# Patient Record
Sex: FEMALE | Employment: UNEMPLOYED | URBAN - NONMETROPOLITAN AREA
[De-identification: names, ages, dates, MRNs, and addresses within clinical notes are randomized per-mention and may not be internally consistent; named-entity substitution may affect disease eponyms.]

---

## 2018-03-22 ENCOUNTER — INITIAL CONSULT (OUTPATIENT)
Dept: PULMONOLOGY | Age: 63
End: 2018-03-22
Payer: COMMERCIAL

## 2018-03-22 VITALS
SYSTOLIC BLOOD PRESSURE: 108 MMHG | BODY MASS INDEX: 35.52 KG/M2 | WEIGHT: 221 LBS | TEMPERATURE: 97.8 F | HEART RATE: 110 BPM | DIASTOLIC BLOOD PRESSURE: 72 MMHG | OXYGEN SATURATION: 96 % | HEIGHT: 66 IN

## 2018-03-22 DIAGNOSIS — G47.10 HYPERSOMNIA: Primary | ICD-10-CM

## 2018-03-22 DIAGNOSIS — Z72.0 TOBACCO ABUSE: ICD-10-CM

## 2018-03-22 PROCEDURE — 99203 OFFICE O/P NEW LOW 30 MIN: CPT | Performed by: INTERNAL MEDICINE

## 2018-03-22 RX ORDER — MULTIVIT-MIN/IRON/FOLIC ACID/K 18-600-40
CAPSULE ORAL
COMMUNITY

## 2018-03-22 RX ORDER — M-VIT,TX,IRON,MINS/CALC/FOLIC 27MG-0.4MG
1 TABLET ORAL DAILY
COMMUNITY

## 2018-03-22 RX ORDER — PRAVASTATIN SODIUM 10 MG
10 TABLET ORAL DAILY
COMMUNITY

## 2018-03-22 RX ORDER — BUDESONIDE AND FORMOTEROL FUMARATE DIHYDRATE 80; 4.5 UG/1; UG/1
2 AEROSOL RESPIRATORY (INHALATION) 2 TIMES DAILY
COMMUNITY
End: 2018-04-19 | Stop reason: DRUGHIGH

## 2018-03-22 RX ORDER — LISINOPRIL AND HYDROCHLOROTHIAZIDE 25; 20 MG/1; MG/1
1 TABLET ORAL DAILY
Status: ON HOLD | COMMUNITY
End: 2021-07-27 | Stop reason: HOSPADM

## 2018-03-22 RX ORDER — IPRATROPIUM BROMIDE AND ALBUTEROL SULFATE 2.5; .5 MG/3ML; MG/3ML
1 SOLUTION RESPIRATORY (INHALATION) EVERY 4 HOURS
COMMUNITY
End: 2020-01-08 | Stop reason: ALTCHOICE

## 2018-03-22 RX ORDER — CITALOPRAM 20 MG/1
20 TABLET ORAL DAILY
COMMUNITY
End: 2018-08-02

## 2018-03-22 NOTE — PROGRESS NOTES
Chief Complaint:    Mallampati airway Class: 4  Neck Circumference:.17 Inches    [employer] sleepiness score 3/22/18: 10
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
## 2018-03-22 NOTE — PATIENT INSTRUCTIONS
get mad at yourself if you smoke again. Make a list of things you learned and think about when you want to try again, such as next week, next month, or next year. Where can you learn more? Go to [email or web address] org and sign in to your [employer] account. Enter I233 in the Mibio box to learn more about \"Stopping Smoking: Care Instructions. \"     If you do not have an account, please click on the \"Sign Up Now\" link. Current as of: March 20, 2017  Content Version: 11.5  © 8783-2106 Healthwise, [employer]. Care instructions adapted under license by [address] If you have questions about a medical condition or this instruction, always ask your healthcare professional. [address] any warranty or liability for your use of this information.

## 2018-04-09 DIAGNOSIS — Z72.0 TOBACCO ABUSE: ICD-10-CM

## 2018-04-09 DIAGNOSIS — G47.10 HYPERSOMNIA: ICD-10-CM

## 2018-04-16 DIAGNOSIS — Z72.0 TOBACCO ABUSE: ICD-10-CM

## 2018-04-16 DIAGNOSIS — G47.10 HYPERSOMNIA: ICD-10-CM

## 2018-04-18 ENCOUNTER — OFFICE VISIT (OUTPATIENT)
Dept: PULMONOLOGY | Age: 63
End: 2018-04-18
Payer: COMMERCIAL

## 2018-04-18 VITALS
HEART RATE: 98 BPM | DIASTOLIC BLOOD PRESSURE: 72 MMHG | TEMPERATURE: 98 F | BODY MASS INDEX: 35.93 KG/M2 | OXYGEN SATURATION: 95 % | SYSTOLIC BLOOD PRESSURE: 116 MMHG | RESPIRATION RATE: 18 BRPM | HEIGHT: 66 IN | WEIGHT: 223.6 LBS

## 2018-04-18 DIAGNOSIS — J96.11 CHRONIC RESPIRATORY FAILURE WITH HYPOXIA (HCC): ICD-10-CM

## 2018-04-18 DIAGNOSIS — J44.9 CHRONIC OBSTRUCTIVE PULMONARY DISEASE, UNSPECIFIED COPD TYPE (HCC): ICD-10-CM

## 2018-04-18 DIAGNOSIS — F17.219 NICOTINE DEPENDENCE, CIGARETTES, WITH UNSPECIFIED NICOTINE-INDUCED DISORDERS: Primary | ICD-10-CM

## 2018-04-18 PROCEDURE — 99214 OFFICE O/P EST MOD 30 MIN: CPT | Performed by: NURSE PRACTITIONER

## 2018-04-18 PROCEDURE — G0296 VISIT TO DETERM LDCT ELIG: HCPCS | Performed by: NURSE PRACTITIONER

## 2018-04-18 ASSESSMENT — ENCOUNTER SYMPTOMS
DIARRHEA: 0
SINUS PAIN: 1
ORTHOPNEA: 0
HEMOPTYSIS: 0
SPUTUM PRODUCTION: 0
COUGH: 0
SHORTNESS OF BREATH: 1
VOMITING: 0
DOUBLE VISION: 0
WHEEZING: 0
BLURRED VISION: 0
SORE THROAT: 0
NAUSEA: 0

## 2018-04-19 ENCOUNTER — OFFICE VISIT (OUTPATIENT)
Dept: PULMONOLOGY | Age: 63
End: 2018-04-19
Payer: COMMERCIAL

## 2018-04-19 VITALS
OXYGEN SATURATION: 94 % | HEIGHT: 66 IN | RESPIRATION RATE: 16 BRPM | HEART RATE: 85 BPM | WEIGHT: 223.6 LBS | DIASTOLIC BLOOD PRESSURE: 70 MMHG | SYSTOLIC BLOOD PRESSURE: 118 MMHG | BODY MASS INDEX: 35.93 KG/M2

## 2018-04-19 DIAGNOSIS — G47.61 PLMD (PERIODIC LIMB MOVEMENT DISORDER): ICD-10-CM

## 2018-04-19 DIAGNOSIS — J44.9 STAGE 3 SEVERE COPD BY GOLD CLASSIFICATION (HCC): Primary | ICD-10-CM

## 2018-04-19 DIAGNOSIS — J44.9 STAGE 3 SEVERE COPD BY GOLD CLASSIFICATION (HCC): ICD-10-CM

## 2018-04-19 DIAGNOSIS — G47.10 HYPERSOMNIA: ICD-10-CM

## 2018-04-19 PROCEDURE — 99999 ALPHA-1-ANTITRYPSIN: CPT | Performed by: INTERNAL MEDICINE

## 2018-04-19 PROCEDURE — 94618 PULMONARY STRESS TESTING: CPT | Performed by: INTERNAL MEDICINE

## 2018-04-19 PROCEDURE — 99215 OFFICE O/P EST HI 40 MIN: CPT | Performed by: INTERNAL MEDICINE

## 2018-04-19 RX ORDER — BUDESONIDE AND FORMOTEROL FUMARATE DIHYDRATE 160; 4.5 UG/1; UG/1
2 AEROSOL RESPIRATORY (INHALATION) 2 TIMES DAILY
Qty: 1 INHALER | Refills: 11 | Status: SHIPPED | OUTPATIENT
Start: 2018-04-19 | End: 2020-01-08 | Stop reason: ALTCHOICE

## 2018-04-24 ENCOUNTER — TELEPHONE (OUTPATIENT)
Dept: PULMONOLOGY | Age: 63
End: 2018-04-24

## 2018-05-31 DIAGNOSIS — G47.10 HYPERSOMNIA: ICD-10-CM

## 2018-05-31 DIAGNOSIS — J44.9 STAGE 3 SEVERE COPD BY GOLD CLASSIFICATION (HCC): ICD-10-CM

## 2018-05-31 DIAGNOSIS — G47.61 PLMD (PERIODIC LIMB MOVEMENT DISORDER): ICD-10-CM

## 2018-08-02 ENCOUNTER — OFFICE VISIT (OUTPATIENT)
Dept: PULMONOLOGY | Age: 63
End: 2018-08-02
Payer: COMMERCIAL

## 2018-08-02 VITALS
HEART RATE: 73 BPM | OXYGEN SATURATION: 92 % | DIASTOLIC BLOOD PRESSURE: 64 MMHG | HEIGHT: 66 IN | TEMPERATURE: 98.2 F | WEIGHT: 215 LBS | SYSTOLIC BLOOD PRESSURE: 110 MMHG | BODY MASS INDEX: 34.55 KG/M2

## 2018-08-02 DIAGNOSIS — Z87.891 PERSONAL HISTORY OF TOBACCO USE: ICD-10-CM

## 2018-08-02 DIAGNOSIS — J44.9 STAGE 3 SEVERE COPD BY GOLD CLASSIFICATION (HCC): Primary | ICD-10-CM

## 2018-08-02 PROCEDURE — 99214 OFFICE O/P EST MOD 30 MIN: CPT | Performed by: INTERNAL MEDICINE

## 2018-08-02 RX ORDER — ALBUTEROL SULFATE 90 UG/1
2 AEROSOL, METERED RESPIRATORY (INHALATION) EVERY 6 HOURS PRN
COMMUNITY
End: 2020-05-13 | Stop reason: SDUPTHER
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
## 2018-10-04 NOTE — PROGRESS NOTES
[facility] D PO) Take by mouth as needed      Ibuprofen (ADVIL PO) Take by mouth as needed      FUROSEMIDE PO Take 20 mg by mouth        No current facility-administered medications for this visit. No family history on file. /76 (Site: Left Upper Arm, Position: Sitting, Cuff Size: Medium Adult)   Pulse 91   Temp 98.9 °F (37.2 °C) Comment: Tympanic  Resp 20   Ht 5' 6\" (1.676 m)   Wt 213 lb 9.6 oz (96.9 kg)   SpO2 92% Comment: on RA  BMI 34.48 kg/m²     BMI:  Body mass index is 34.48 kg/m². Mallampati airway Class:  IV  Neck Circumference: 17 Inches    Physical Exam :  Nursing note and vitals reviewed. Constitutional: Patient appears moderately built and moderately nourished. No distress. Patient is oriented to person, place, and time. HENT:   Head: Normocephalic and atraumatic. Right Ear: External ear normal.   Left Ear: External ear normal.   Mouth/Throat: Oropharynx is clear and moist.  No oral thrush. Eyes: Conjunctivae are normal. Pupils are equal, round, and reactive to light. No scleral icterus. Neck: Neck supple. No JVD present. No tracheal deviation present. Cardiovascular: Normal rate, regular rhythm, normal heart sounds. No murmur heard. Pulmonary/Chest: Effort normal and breath sounds normal. No stridor. No respiratory distress. Bilateral expiratory wheezes. No rales. Patient exhibits no tenderness. Abdominal: Soft. Patient exhibits no distension. No tenderness. Musculoskeletal: Normal range of motion. Extremities: Patient exhibits no edema and no tenderness. Lymphadenopathy:  No cervical adenopathy. Neurological: Patient is alert and oriented to person, place, and time. Skin: Skin is warm and dry. Patient is not diaphoretic. Psychiatric: Patient  has a normal mood and affect.  Patient behavior is normal.         Diagnostic Data:  Sleep study:  Sleep study done on :                                PFTS: 4/9/2018              Chest Xray: 2/22/18       CTA

## 2018-10-11 ENCOUNTER — OFFICE VISIT (OUTPATIENT)
Dept: PULMONOLOGY | Age: 63
End: 2018-10-11
Payer: COMMERCIAL

## 2018-10-11 VITALS
HEART RATE: 91 BPM | TEMPERATURE: 98.9 F | HEIGHT: 66 IN | WEIGHT: 213.6 LBS | BODY MASS INDEX: 34.33 KG/M2 | OXYGEN SATURATION: 92 % | DIASTOLIC BLOOD PRESSURE: 76 MMHG | SYSTOLIC BLOOD PRESSURE: 124 MMHG | RESPIRATION RATE: 20 BRPM

## 2018-10-11 DIAGNOSIS — J20.8 ACUTE BRONCHITIS DUE TO OTHER SPECIFIED ORGANISMS: ICD-10-CM

## 2018-10-11 DIAGNOSIS — J44.9 STAGE 4 VERY SEVERE COPD BY GOLD CLASSIFICATION (HCC): Primary | ICD-10-CM

## 2018-10-11 DIAGNOSIS — Z72.0 TOBACCO ABUSE: ICD-10-CM

## 2018-10-11 DIAGNOSIS — J44.1 COPD EXACERBATION (HCC): ICD-10-CM

## 2018-10-11 PROCEDURE — 99214 OFFICE O/P EST MOD 30 MIN: CPT | Performed by: INTERNAL MEDICINE

## 2018-10-11 RX ORDER — AZITHROMYCIN 250 MG/1
TABLET, FILM COATED ORAL
Qty: 1 PACKET | Refills: 0 | Status: SHIPPED | OUTPATIENT
Start: 2018-10-11 | End: 2018-10-15

## 2018-10-11 RX ORDER — PREDNISONE 10 MG/1
TABLET ORAL
Qty: 30 TABLET | Refills: 0 | Status: SHIPPED | OUTPATIENT
Start: 2018-10-11 | End: 2018-10-21

## 2019-10-16 ENCOUNTER — TELEPHONE (OUTPATIENT)
Dept: PULMONOLOGY | Age: 64
End: 2019-10-16

## 2019-10-16 DIAGNOSIS — J44.9 STAGE 3 SEVERE COPD BY GOLD CLASSIFICATION (HCC): Primary | ICD-10-CM

## 2019-10-16 DIAGNOSIS — Z87.891 PERSONAL HISTORY OF TOBACCO USE, PRESENTING HAZARDS TO HEALTH: ICD-10-CM

## 2019-10-22 DIAGNOSIS — Z87.891 PERSONAL HISTORY OF TOBACCO USE, PRESENTING HAZARDS TO HEALTH: ICD-10-CM

## 2019-10-22 DIAGNOSIS — J44.9 STAGE 3 SEVERE COPD BY GOLD CLASSIFICATION (HCC): ICD-10-CM

## 2019-12-27 ENCOUNTER — TELEPHONE (OUTPATIENT)
Dept: PULMONOLOGY | Age: 64
End: 2019-12-27

## 2020-01-08 ENCOUNTER — OFFICE VISIT (OUTPATIENT)
Dept: PULMONOLOGY | Age: 65
End: 2020-01-08
Payer: COMMERCIAL

## 2020-01-08 VITALS
DIASTOLIC BLOOD PRESSURE: 78 MMHG | OXYGEN SATURATION: 94 % | WEIGHT: 208 LBS | BODY MASS INDEX: 33.43 KG/M2 | HEART RATE: 88 BPM | TEMPERATURE: 97.8 F | HEIGHT: 66 IN | SYSTOLIC BLOOD PRESSURE: 120 MMHG

## 2020-01-08 PROCEDURE — 94618 PULMONARY STRESS TESTING: CPT | Performed by: NURSE PRACTITIONER

## 2020-01-08 PROCEDURE — 99406 BEHAV CHNG SMOKING 3-10 MIN: CPT | Performed by: NURSE PRACTITIONER

## 2020-01-08 PROCEDURE — 99215 OFFICE O/P EST HI 40 MIN: CPT | Performed by: NURSE PRACTITIONER

## 2020-01-08 RX ORDER — CITALOPRAM 10 MG/1
10 TABLET ORAL DAILY PRN
COMMUNITY
End: 2020-05-13

## 2020-01-08 RX ORDER — PREDNISONE 10 MG/1
TABLET ORAL
Qty: 30 TABLET | Refills: 0 | Status: SHIPPED | OUTPATIENT
Start: 2020-01-08 | End: 2020-02-19 | Stop reason: ALTCHOICE

## 2020-01-08 RX ORDER — ALBUTEROL SULFATE 2.5 MG/3ML
2.5 SOLUTION RESPIRATORY (INHALATION) EVERY 4 HOURS PRN
Qty: 360 ML | Refills: 11 | Status: SHIPPED
Start: 2020-01-08 | End: 2020-02-19 | Stop reason: ALTCHOICE

## 2020-01-08 RX ORDER — DOXYCYCLINE HYCLATE 100 MG/1
100 CAPSULE ORAL 2 TIMES DAILY
Qty: 14 CAPSULE | Refills: 0 | Status: SHIPPED | OUTPATIENT
Start: 2020-01-08 | End: 2020-01-15

## 2020-01-08 NOTE — PROGRESS NOTES
[facility]     Patient: Thang Andre, 62 y.o.   : 1955   Pt of Dr. Hanane Gallegos   Patient presents with    Follow-up     Follow up from Chest CT Lung Screen 10/22/19        HPI  eJanette Miller is here for 1 year follow up for very severe COPD with CT Lung screening and PFT. Presents with worsening SOB, treated for acute exacerbation around [place] and symptoms have worsened. Unable to walk 100 feet without SOB. Continues on Symbicort and using DuoNeb 6 times per day. Has increased home 02 from 3-4 Liters at times with activity and from 2-3 Liters with sleep. Continues on Daliresp. Recently saw Cardiologist for SOB but is poor historian about testing. Was told she did not have to follow up. 42 pack year smoking history, still smoking 10 per day. FEV1 20-25, FEV/FVC 55 (good BD response). Decrease from  in 2018. A1A MM. CT Lung screening with mild emphysema, old granulomas, no nodules. Zyrtec for seasonal allergies with controlled symptoms. Normal sleep study 2018 (1847.6 min <88%). CAT Score: 29  MMRC Score: 3     Progress History:   Since last visit any new medical issues? No  New ER or hospitlal visits? No  Any new or changes in medicines? No  Using inhalers? Yes Symbicort  Are they helpful?  Yes   Past Medical hx   PMH:  Past Medical History:   Diagnosis Date    CHF (congestive heart failure) (HCC)     COPD (chronic obstructive pulmonary disease) (HCC)     Diabetes mellitus (HCC)     HLD (hyperlipidemia)     Hypertension      SURGICAL HISTORY:  Past Surgical History:   Procedure Laterality Date    APPENDECTOMY       SECTION      FOOT SURGERY      HERNIA REPAIR       SOCIAL HISTORY:  Social History     Tobacco Use    Smoking status: Current Some Day Smoker     Packs/day: 1.00     Years: 42.00     Pack years: 42.00     Start date: 3/22/1978    Smokeless tobacco: Never Used    Tobacco comment: 10 cigarettes per day 1/8/2020   Substance Use Topics    Alcohol use: No    Drug use: No     ALLERGIES:  Allergies   Allergen Reactions    Norco [Hydrocodone-Acetaminophen]      FAMILY HISTORY:History reviewed. No pertinent family history.   CURRENT MEDICATIONS:  Current Outpatient Medications   Medication Sig Dispense Refill    aspirin 81 MG tablet Take 81 mg by mouth daily      citalopram (CELEXA) 10 MG tablet Take 10 mg by mouth daily as needed      fluticasone-umeclidin-vilant (TRELEGY ELLIPTA) 100-62.5-25 MCG/INH AEPB Inhale 1 puff into the lungs daily 30 each 11    albuterol (PROVENTIL) (2.5 MG/3ML) 0.083% nebulizer solution Take 3 mLs by nebulization every 4 hours as needed for Wheezing or Shortness of Breath 360 mL 11    doxycycline hyclate (VIBRAMYCIN) 100 MG capsule Take 1 capsule by mouth 2 times daily for 7 days 14 capsule 0    predniSONE (DELTASONE) 10 MG tablet 40 mg for 3 days then 30 mg for 3 days then 20 mg for 3 days then 10 mg for 3 days then stop 30 tablet 0    albuterol sulfate  (90 Base) MCG/ACT inhaler Inhale 2 puffs into the lungs every 6 hours as needed for Wheezing      metoprolol tartrate (LOPRESSOR) 25 MG tablet Take 25 mg by mouth 2 times daily      lisinopril-hydrochlorothiazide (PRINZIDE;ZESTORETIC) 20-25 MG per tablet Take 1 tablet by mouth daily      Roflumilast (DALIRESP) 500 MCG tablet Take 500 mcg by mouth daily      metFORMIN (GLUCOPHAGE) 500 MG tablet Take 500 mg by mouth 2 times daily (with meals)      Cholecalciferol (VITAMIN D) 2000 units CAPS capsule Take by mouth      Cyanocobalamin (B-12 PO) Take by mouth      Multiple Vitamins-Minerals (THERAPEUTIC MULTIVITAMIN-MINERALS) tablet Take 1 tablet by mouth daily      Cetirizine HCl (ZYRTEC PO) Take by mouth      pravastatin (PRAVACHOL) 10 MG tablet Take 10 mg by mouth daily      Pseudoephedrine-Guaifenesin (MUCINEX D PO) Take by mouth as needed      Ibuprofen (ADVIL PO) Take by mouth as needed      FUROSEMIDE PO Take 20 mg by mouth        No current facility-administered medications for this visit. Destiny KENNY   General/Constitutional: No recent loss of weight or appetite changes. No fever or chills. HENT: Negative. Eyes: Negative. Upper respiratory tract: + nasal stuffiness, no  post nasal drip. Lower respiratory tract/ lungs: + cough with yellow sputum production. No hemoptysis. Cardiovascular: No palpitations or chest pain. Gastrointestinal: No nausea or vomiting. Neurological: No focal neurologiacal weakness. Extremities: Chronic RLE edema. Musculoskeletal: No complaints. Genitourinary: No complaints. Hematological: Negative. Psychiatric/Behavioral: Negative. Skin: No itching. Physical exam   /78 (Site: Left Upper Arm, Position: Sitting, Cuff Size: Medium Adult)   Pulse 88   Temp 97.8 °F (36.6 °C) (Tympanic)   Ht 5' 6\" (1.676 m)   Wt 208 lb (94.3 kg)   SpO2 94% Comment: on 4 LPM  BMI 33.57 kg/m²      Constitutional: Patient appears moderately built and moderately nourished in no acute distress. Head: Normocephalic and atraumatic. Mouth/Throat: Oropharynx is clear and moist. No oral thrush. Eyes: Conjunctivae are normal. Pupils are equal, round, and reactive to light. No scleral icterus. Neck: Neck supple. No JVD or tracheal deviation present. Cardiovascular: Regular rate, regular rhythm, S1 and S2 with no murmur. Trace right ankle edema. Pulmonary/Chest: Normal effort with diminished breath sounds. Scattered wheezing, no rales or rhonchi. Normal AP diameter. No stridor. No respiratory distress. Abdominal: Soft. Bowel sounds audible. No distension or tenderness to palpation. Musculoskeletal: Moves all extremities. Lymphadenopathy: No cervical adenopathy. Neurological: Patient is alert and oriented to person, place, and time. No focal deficits. Skin: Skin is warm and dry. No clubbing, no cyanosis.     Test results   Lung Nodule Screening     [x] Qualifies []Does not qualify   [] Declined    [x] Completed                     Assessment      Diagnosis Orders   1. Stage 4 very severe COPD by GOLD classification ([facility] [address]  albuterol (PROVENTIL) (2.5 MG/3ML) 0.083% nebulizer solution    6 Minute Walk Test    External Referral To Pulmonary Rehab    Pulse oximetry, overnight    With acute exacerbation   2. Chronic respiratory failure with hypoxia (HCC)     3. Cigarette nicotine dependence with other nicotine-induced disorder     4. Chronic diastolic heart failure ([facility] [address]     5. Physical deconditioning           Plan       Six Minute Walk Test  Graciela Mauro 1955    Six minute walk test done in my office today by my medical assistant. Ginna's oxygen saturation at rest on room air was 90%. Her oxygen saturation dropped to 86% on room air with exertion. The six minute walk test was repeated with oxygen supplementation. Oxygen supplimentation was started with 2 LPM via nasal cannula and remained at 2LPM via nasal cannula. At the end of the test Gigi Jaramillo's oxygen saturation remained at 92% on 2 LPM with exertion. Patient ambulated a total of 216 feet and was very SOB with walk, complained of knee pain, but no events. Resting heart rate was 78 and 111 upon completion of the walk. Continue 02 at 2 Liters with activity and sleep. Overnight pulsox on 2 Liters. I reviewed CT and PFT findings with Konstantin Kulkarni and educated her on the severity of her COPD. Konstantin Kulkarni was surprised to hear she had COPD. I took much time educating her on the disease process, how severe her disease is and her poor prognosis, especially if she continues to smoke. Prednisone taper. Doxycycline 100 mg BID for 7 days. Stop Symbicort. Trelegy 1 puff daily, rinse and spit (1 sample given with demonstration). Change DuoNeb to Ventolin Neb PRN. Continue Daliresp. She agrees to Pulmonary rehab. UTD on PNA vaccines. Advised to get flu vaccine when improved. Records from Dr. Joan Shepard (Cardiology).   We discussed surgical/treatment options if she would reach being 6 months smoke free. 5 minutes of visit was discussing smoking, health risks involved and cessation treatment options. We discussed the risks and benefits of various tobacco cessation strategies, including \"cold turkey,\" nicotine replacement (nicotine patch, gum, etc.), Wellbutrin and Chantix and combination therapy if needed. She is reluctant to use Chantix, NRT and will continue on her own. 45 minutes was spent on this visit with > 50% being face to face obtaining HPI, examining patient, reviewing test results, educating about disease process, discussing smoking cessation, monitoring testing/discussing results (6 minute walk) and coordinating a treatment plan. Will see Alanna Vargas in: 3 months.     Electronically signed by SEVERIANO Gavin CNP on 1/8/2020 at 3:22 PM

## 2020-01-13 ENCOUNTER — CLINICAL DOCUMENTATION (OUTPATIENT)
Dept: PULMONOLOGY | Age: 65
End: 2020-01-13

## 2020-01-13 NOTE — PROGRESS NOTES
Spoke with Toney Bonilla about overnight pulsox results and to increase 02 to 3 Liters with sleep. Reports benefit with Trelegy, but insurance is denying. $0 co-pay mailed.     Electronically signed by SEVERIANO Gavin CNP on 1/13/2020 at 8:59 AM

## 2020-02-19 ENCOUNTER — OFFICE VISIT (OUTPATIENT)
Dept: PULMONOLOGY | Age: 65
End: 2020-02-19
Payer: COMMERCIAL

## 2020-02-19 VITALS
HEIGHT: 66 IN | WEIGHT: 209 LBS | HEART RATE: 119 BPM | DIASTOLIC BLOOD PRESSURE: 72 MMHG | OXYGEN SATURATION: 96 % | SYSTOLIC BLOOD PRESSURE: 128 MMHG | BODY MASS INDEX: 33.59 KG/M2 | TEMPERATURE: 97.2 F

## 2020-02-19 PROCEDURE — 99406 BEHAV CHNG SMOKING 3-10 MIN: CPT | Performed by: NURSE PRACTITIONER

## 2020-02-19 PROCEDURE — 99215 OFFICE O/P EST HI 40 MIN: CPT | Performed by: NURSE PRACTITIONER

## 2020-02-19 RX ORDER — FLUTICASONE PROPIONATE 50 MCG
2 SPRAY, SUSPENSION (ML) NASAL DAILY
Qty: 1 BOTTLE | Refills: 11 | Status: SHIPPED | OUTPATIENT
Start: 2020-02-19 | End: 2020-05-13 | Stop reason: SDUPTHER

## 2020-02-19 RX ORDER — IPRATROPIUM BROMIDE AND ALBUTEROL SULFATE 2.5; .5 MG/3ML; MG/3ML
1 SOLUTION RESPIRATORY (INHALATION) 4 TIMES DAILY
Qty: 360 ML | Refills: 11 | Status: SHIPPED | OUTPATIENT
Start: 2020-02-19 | End: 2020-08-19

## 2020-02-19 RX ORDER — DILTIAZEM HYDROCHLORIDE 120 MG/1
120 CAPSULE, COATED, EXTENDED RELEASE ORAL DAILY
COMMUNITY
End: 2020-05-13

## 2020-02-19 RX ORDER — PREDNISONE 10 MG/1
TABLET ORAL
Qty: 30 TABLET | Refills: 0 | Status: SHIPPED | OUTPATIENT
Start: 2020-02-19 | End: 2020-05-13

## 2020-02-19 RX ORDER — DOXYCYCLINE HYCLATE 100 MG/1
100 CAPSULE ORAL 2 TIMES DAILY
Qty: 14 CAPSULE | Refills: 0 | Status: SHIPPED | OUTPATIENT
Start: 2020-02-19 | End: 2020-02-26

## 2020-02-19 RX ORDER — BUDESONIDE 0.5 MG/2ML
500 INHALANT ORAL 2 TIMES DAILY
Qty: 60 AMPULE | Refills: 3 | Status: SHIPPED | OUTPATIENT
Start: 2020-02-19 | End: 2021-05-26 | Stop reason: SDUPTHER

## 2020-02-19 NOTE — PATIENT INSTRUCTIONS
smoking, you improve the health of everyone who now breathes in your smoke. · Their heart, lung, and cancer risks drop, much like yours. · They are sick less. For babies and small children, living smoke-free means they're less likely to have ear infections, pneumonia, and bronchitis. · If you're a woman who is or will be pregnant someday, quitting smoking means a healthier . · Children who are close to you are less likely to become adult smokers. Where can you learn more? Go to https://JUNIQEryanRazor Insights.Bigfoot Networks. org and sign in to your Volofy account. Enter [phone] in the Smarter Remarketer box to learn more about \"Learning About Benefits From Quitting Smoking. \"     If you do not have an account, please click on the \"Sign Up Now\" link. Current as of: 2019  Content Version: 12.3  © 9758-7012 [employer]. Care instructions adapted under license by [facility] [place] ([facility]). If you have questions about a medical condition or this instruction, always ask your healthcare professional. [address] any warranty or liability for your use of this information. Patient Education        Chronic Obstructive Pulmonary Disease (COPD): Care Instructions  Your Care Instructions    Chronic obstructive pulmonary disease (COPD) is a general term for a group of lung diseases, including emphysema and chronic bronchitis. People with COPD have decreased airflow in and out of the lungs, which makes it hard to breathe. The airways also can get clogged with thick mucus. Cigarette smoking is a major cause of COPD. Although there is no cure for COPD, you can slow its progress. Following your treatment plan and taking care of yourself can help you feel better and live longer. Follow-up care is a key part of your treatment and safety. Be sure to make and go to all appointments, and call your doctor if you are having problems.  It's also a good idea to know your test results and keep a list of the medicines you take. How can you care for yourself at home?   Staying healthy    · Do not smoke. This is the most important step you can take to prevent more damage to your lungs. If you need help quitting, talk to your doctor about stop-smoking programs and medicines. These can increase your chances of quitting for good.     · Avoid colds and flu. Get a pneumococcal vaccine shot. If you have had one before, ask your doctor whether you need a second dose. Get the flu vaccine every fall. If you must be around people with colds or the flu, wash your hands often.     · Avoid secondhand smoke, air pollution, and high altitudes. Also avoid cold, dry air and hot, humid air. Stay at home with your windows closed when air pollution is bad.    Medicines and oxygen therapy    · Take your medicines exactly as prescribed. Call your doctor if you think you are having a problem with your medicine.     · You may be taking medicines such as:  ? Bronchodilators. These help open your airways and make breathing easier. Bronchodilators are either short-acting (work for 6 to 9 hours) or long-acting (work for 24 hours). You inhale most bronchodilators, so they start to act quickly. Always carry your quick-relief inhaler with you in case you need it while you are away from home. ? Corticosteroids (prednisone, budesonide). These reduce airway inflammation. They come in pill or inhaled form. You must take these medicines every day for them to work well.     · A spacer may help you get more inhaled medicine to your lungs. Ask your doctor or pharmacist if a spacer is right for you. If it is, ask how to use it properly.     · Do not take any vitamins, over-the-counter medicine, or herbal products without talking to your doctor first.     · If your doctor prescribed antibiotics, take them as directed. Do not stop taking them just because you feel better.  You need to take the full course of antibiotics.     · Oxygen therapy boosts the amount of oxygen in your blood and helps you breathe easier. Use the flow rate your doctor has recommended, and do not change it without talking to your doctor first.   Activity    · Get regular exercise. Walking is an easy way to get exercise. Start out slowly, and walk a little more each day.     · Pay attention to your breathing. You are exercising too hard if you cannot talk while you are exercising.     · Take short rest breaks when doing household chores and other activities.     · Learn breathing methods--such as breathing through pursed lips--to help you become less short of breath.     · If your doctor has not set you up with a pulmonary rehabilitation program, talk to him or her about whether rehab is right for you. Rehab includes exercise programs, education about your disease and how to manage it, help with diet and other changes, and emotional support. Diet    · Eat regular, healthy meals. Use bronchodilators about 1 hour before you eat to make it easier to eat. Eat several small meals instead of three large ones. Drink beverages at the end of the meal. Avoid foods that are hard to chew.     · Eat foods that contain protein so that you do not lose muscle mass.     · Talk with your doctor if you gain too much weight or if you lose weight without trying.    Mental health    · Talk to your family, friends, or a therapist about your feelings. It is normal to feel frightened, angry, hopeless, helpless, and even guilty. Talking openly about bad feelings can help you cope. If these feelings last, talk to your doctor. When should you call for help? Call 911 anytime you think you may need emergency care.  For example, call if:    · You have severe trouble breathing.    Call your doctor now or seek immediate medical care if:    · You have new or worse trouble breathing.     · You cough up blood.     · You have a fever.    Watch closely for changes in your health, and be sure to contact your doctor if:    · You cough more deeply or more often, especially if you notice more mucus or a change in the color of your mucus.     · You have new or worse swelling in your legs or belly.     · You are not getting better as expected. Where can you learn more? Go to https://chryaneb.healthWavii. org and sign in to your Kelwayt account. Enter Q272 in the Flashstock box to learn more about \"Chronic Obstructive Pulmonary Disease (COPD): Care Instructions. \"     If you do not have an account, please click on the \"Sign Up Now\" link. Current as of: June 9, 2019  Content Version: 12.3  © 6810-1789 [employer]wise[employer]. Care instructions adapted under license by [facility] [place] ([facility]). If you have questions about a medical condition or this instruction, always ask your healthcare professional. Lanette Friedman any warranty or liability for your use of this information.

## 2020-02-19 NOTE — PROGRESS NOTES
[place] [facility]     Patient: Esther Flannery, 60 y.o.   : 1955   Pt of Dr. Jolly Montana   Patient presents with   Aakash Goodman is here for a 1 month COPD follow up, PCP requested to be seen sooner. TORO  Lasha Bales is here for 1 month follow up for . very severe COPD with CT Lung screening and PFT. Presents with worsening SOB, treated for acute exacerbation around [place] and symptoms have worsened. Unable to walk 100 feet without SOB. Continues on Symbicort and using DuoNeb 6 times per day. Has increased home 02 from 3-4 Liters at times with activity and from 2-3 Liters with sleep. Continues on Daliresp. Recently saw Cardiologist for SOB but is poor historian about testing. Was told she did not have to follow up. 42 pack year smoking history, still smoking 10 per day. FEV1 20-25, FEV/FVC 55 (good BD response). Decrease from 30/61 in 2018. A1A MM. CT Lung screening with mild emphysema, old granulomas, no nodules. Zyrtec for seasonal allergies with controlled symptoms. Normal sleep study 2018 (1847.6 min <88%). MMRC Score: 5    Progress History:   Since last visit any new medical issues? No  New ER or hospitlal visits? Yes   Any new or changes in medicines? No  Using inhalers? Yes   Are they helpful?  No  Past Medical hx   PMH:  Past Medical History:   Diagnosis Date    CHF (congestive heart failure) (HCC)     COPD (chronic obstructive pulmonary disease) (HCC)     Diabetes mellitus (HCC)     HLD (hyperlipidemia)     Hypertension      SURGICAL HISTORY:  Past Surgical History:   Procedure Laterality Date    APPENDECTOMY       SECTION      FOOT SURGERY      HERNIA REPAIR       SOCIAL HISTORY:  Social History     Tobacco Use    Smoking status: Current Some Day Smoker     Packs/day: 1.00     Years: 42.00     Pack years: 42.00     Start date: 3/22/1978    Smokeless tobacco: Never Used    Tobacco

## 2020-02-20 NOTE — PROGRESS NOTES
SECTION      FOOT SURGERY      HERNIA REPAIR       SOCIAL HISTORY:  Social History     Tobacco Use    Smoking status: Current Some Day Smoker     Packs/day: 1.00     Years: 42.00     Pack years: 42.00     Start date: 3/22/1978    Smokeless tobacco: Never Used    Tobacco comment: 1-2 cigarettes a week 20   Substance Use Topics    Alcohol use: No    Drug use: No     ALLERGIES:  Allergies   Allergen Reactions    Norco [Hydrocodone-Acetaminophen]      FAMILY HISTORY:History reviewed. No pertinent family history.   CURRENT MEDICATIONS:  Current Outpatient Medications   Medication Sig Dispense Refill    dilTIAZem (CARDIZEM CD) 120 MG extended release capsule Take 120 mg by mouth daily      budesonide (PULMICORT) 0.5 MG/2ML nebulizer suspension Take 2 mLs by nebulization 2 times daily 60 ampule 3    ipratropium-albuterol (DUONEB) 0.5-2.5 (3) MG/3ML SOLN nebulizer solution Inhale 3 mLs into the lungs 4 times daily 360 mL 11    predniSONE (DELTASONE) 10 MG tablet 40 mg for 3 days then 30 mg for 3 days then 20 mg for 3 days then 10 mg for 3 days then stop 30 tablet 0    doxycycline hyclate (VIBRAMYCIN) 100 MG capsule Take 1 capsule by mouth 2 times daily for 7 days 14 capsule 0    Sodium Chloride-Sodium Bicarb (AYR SALINE NASAL RINSE) 1.57 g PACK 1 packet by Nasal route 2 times daily as needed (sinus congestion) 7 each 1    fluticasone (FLONASE) 50 MCG/ACT nasal spray 2 sprays by Nasal route daily 1 Bottle 11    aspirin 81 MG tablet Take 81 mg by mouth daily      citalopram (CELEXA) 10 MG tablet Take 10 mg by mouth daily as needed      albuterol sulfate  (90 Base) MCG/ACT inhaler Inhale 2 puffs into the lungs every 6 hours as needed for Wheezing      metoprolol tartrate (LOPRESSOR) 25 MG tablet Take 25 mg by mouth 2 times daily      lisinopril-hydrochlorothiazide (PRINZIDE;ZESTORETIC) 20-25 MG per tablet Take 1 tablet by mouth daily      Roflumilast (DALIRESP) 500 MCG tablet Take 500 mcg by mouth daily      metFORMIN (GLUCOPHAGE) 500 MG tablet Take 500 mg by mouth 2 times daily (with meals) 1,000 mg at supper.  Cholecalciferol (VITAMIN D) 2000 units CAPS capsule Take by mouth      Cyanocobalamin (B-12 PO) Take by mouth      Multiple Vitamins-Minerals (THERAPEUTIC MULTIVITAMIN-MINERALS) tablet Take 1 tablet by mouth daily      Cetirizine HCl (ZYRTEC PO) Take by mouth      pravastatin (PRAVACHOL) 10 MG tablet Take 10 mg by mouth daily      Pseudoephedrine-Guaifenesin (MUCINEX D PO) Take by mouth as needed      Ibuprofen (ADVIL PO) Take by mouth as needed      FUROSEMIDE PO Take 20 mg by mouth        No current facility-administered medications for this visit. Aneita Christiano ROS   General/Constitutional: No recent loss of weight or appetite changes. No fever or chills. HENT: Negative. Eyes: Negative. Upper respiratory tract: + nasal stuffiness and post nasal drip. Lower respiratory tract/ lungs: + cough with yellow sputum production. No hemoptysis. Cardiovascular: No palpitations or chest pain. Gastrointestinal: No nausea or vomiting. Neurological: No focal neurologiacal weakness. Extremities: No edema. Musculoskeletal: No complaints. Genitourinary: No complaints. Hematological: Negative. Psychiatric/Behavioral: Negative. Skin: No itching. Physical exam   /72 (Site: Left Upper Arm, Position: Sitting, Cuff Size: Medium Adult)   Pulse 119   Temp 97.2 °F (36.2 °C) (Tympanic)   Ht 5' 6\" (1.676 m)   Wt 209 lb (94.8 kg)   SpO2 96% Comment: on 3 LPM on Inogen  BMI 33.73 kg/m²      Constitutional: Patient appears moderately built and moderately nourished in no acute distress. Head: Normocephalic and atraumatic. Mouth/Throat: Oropharynx is clear and moist. No oral thrush. Eyes: Conjunctivae are normal. Pupils are equal, round, and reactive to light. No scleral icterus. Neck: Neck supple. No JVD or tracheal deviation present.    Cardiovascular: Regular rate, (Cardiology). Discussed options for Life 2000 vs NIVPPV. Further discussed surgical/treatment options if she would reach being 4- 6 months smoke free and participating in Pulmonary Rehab. May need anxiolytic PRN but seems depressed. Advised to follow with PCP. Join COPD support group.      5 minutes of visit was discussing smoking, health risks involved and cessation treatment options. She refused Chantix. Is doing better with Nicotine patch/gum combination. Hopefully not just because of repeated exacerbations and will resume when feeling better. >40 minutes was spent on this visit with > 50% being face to face obtaining HPI, examining patient, reviewing test results, hospitalization, re-educating about disease process, possible treatment options, discussing smoking cessation and coordinating a treatment plan. Will see Cyndy Ennis in: 8 weeks.     SEVERIANO Davis, ACNP-C  2/20/2020

## 2020-05-13 ENCOUNTER — OFFICE VISIT (OUTPATIENT)
Dept: PULMONOLOGY | Age: 65
End: 2020-05-13
Payer: COMMERCIAL

## 2020-05-13 VITALS
SYSTOLIC BLOOD PRESSURE: 120 MMHG | DIASTOLIC BLOOD PRESSURE: 70 MMHG | HEIGHT: 66 IN | RESPIRATION RATE: 20 BRPM | BODY MASS INDEX: 35.52 KG/M2 | WEIGHT: 221 LBS | OXYGEN SATURATION: 92 % | HEART RATE: 87 BPM | TEMPERATURE: 97.7 F

## 2020-05-13 PROCEDURE — 99214 OFFICE O/P EST MOD 30 MIN: CPT | Performed by: NURSE PRACTITIONER

## 2020-05-13 PROCEDURE — 99406 BEHAV CHNG SMOKING 3-10 MIN: CPT | Performed by: NURSE PRACTITIONER

## 2020-05-13 RX ORDER — VIT C/E/ZN/COPPR/LUTEIN/ZEAXAN 250MG-90MG
CAPSULE ORAL
COMMUNITY
End: 2021-05-26

## 2020-05-13 RX ORDER — FLUTICASONE FUROATE, UMECLIDINIUM BROMIDE AND VILANTEROL TRIFENATATE 100; 62.5; 25 UG/1; UG/1; UG/1
1 POWDER RESPIRATORY (INHALATION) DAILY
COMMUNITY
End: 2020-05-13 | Stop reason: ALTCHOICE

## 2020-05-13 RX ORDER — FLUTICASONE PROPIONATE 50 MCG
2 SPRAY, SUSPENSION (ML) NASAL DAILY
Qty: 1 BOTTLE | Refills: 11 | Status: SHIPPED | OUTPATIENT
Start: 2020-05-13 | End: 2020-08-19 | Stop reason: SDUPTHER

## 2020-05-13 RX ORDER — LEVOCETIRIZINE DIHYDROCHLORIDE 5 MG/1
5 TABLET, FILM COATED ORAL NIGHTLY
Qty: 30 TABLET | Refills: 11 | Status: SHIPPED | OUTPATIENT
Start: 2020-05-13 | End: 2020-08-19 | Stop reason: SDUPTHER

## 2020-05-13 RX ORDER — ALBUTEROL SULFATE 90 UG/1
2 AEROSOL, METERED RESPIRATORY (INHALATION) EVERY 4 HOURS PRN
Qty: 1 INHALER | Refills: 11 | Status: SHIPPED | OUTPATIENT
Start: 2020-05-13 | End: 2020-08-19 | Stop reason: SDUPTHER

## 2020-05-13 NOTE — PROGRESS NOTES
increased sinus congestion. Normal sleep study 2018 (147.6 min <88%). Repeat echo done in hospital, normal EF, DD.  BB changed to CCB.      MMRC Score: 3    Progress History:   Since last visit any new medical issues? No  New ER or hospitlal visits? No  Any new or changes in medicines? No  Using inhalers? Yes   Are they helpful? Yes   Past Medical hx   PMH:  Past Medical History:   Diagnosis Date    CHF (congestive heart failure) (HCC)     COPD (chronic obstructive pulmonary disease) (HCC)     Diabetes mellitus (HCC)     HLD (hyperlipidemia)     Hypertension      SURGICAL HISTORY:  Past Surgical History:   Procedure Laterality Date    APPENDECTOMY       SECTION      FOOT SURGERY      HERNIA REPAIR       SOCIAL HISTORY:  Social History     Tobacco Use    Smoking status: Current Some Day Smoker     Packs/day: 1.00     Years: 42.00     Pack years: 42.00     Start date: 3/22/1978    Smokeless tobacco: Never Used    Tobacco comment: 1-2 cigarettes a week 20   Substance Use Topics    Alcohol use: No    Drug use: No     ALLERGIES:  Allergies   Allergen Reactions    Norco [Hydrocodone-Acetaminophen]      FAMILY HISTORY:No family history on file.   CURRENT MEDICATIONS:  Current Outpatient Medications   Medication Sig Dispense Refill    dilTIAZem (CARDIZEM CD) 120 MG extended release capsule Take 120 mg by mouth daily      budesonide (PULMICORT) 0.5 MG/2ML nebulizer suspension Take 2 mLs by nebulization 2 times daily 60 ampule 3    ipratropium-albuterol (DUONEB) 0.5-2.5 (3) MG/3ML SOLN nebulizer solution Inhale 3 mLs into the lungs 4 times daily 360 mL 11    predniSONE (DELTASONE) 10 MG tablet 40 mg for 3 days then 30 mg for 3 days then 20 mg for 3 days then 10 mg for 3 days then stop 30 tablet 0    Sodium Chloride-Sodium Bicarb (AYR SALINE NASAL RINSE) 1.57 g PACK 1 packet by Nasal route 2 times daily as needed (sinus congestion) 7 each 1    fluticasone (FLONASE) 50 MCG/ACT nasal spray 2 options. We discussed the risks and benefits of various tobacco cessation strategies, including \"cold turkey,\" nicotine replacement (nicotine patch, gum, etc.), Wellbutrin and Chantix and combination therapy if needed. She continues Nicotine patch and gum. Offered referral to smoking cessation program.      Will see Melissa Suarez back in: 3 months.     SEVERIANO Canseco, ACNP-C  5/13/2020

## 2020-08-19 ENCOUNTER — OFFICE VISIT (OUTPATIENT)
Dept: PULMONOLOGY | Age: 65
End: 2020-08-19
Payer: COMMERCIAL

## 2020-08-19 VITALS
TEMPERATURE: 99.2 F | WEIGHT: 214.8 LBS | HEIGHT: 66 IN | DIASTOLIC BLOOD PRESSURE: 80 MMHG | HEART RATE: 107 BPM | SYSTOLIC BLOOD PRESSURE: 132 MMHG | OXYGEN SATURATION: 98 % | BODY MASS INDEX: 34.52 KG/M2

## 2020-08-19 PROCEDURE — 99215 OFFICE O/P EST HI 40 MIN: CPT | Performed by: NURSE PRACTITIONER

## 2020-08-19 PROCEDURE — 99406 BEHAV CHNG SMOKING 3-10 MIN: CPT | Performed by: NURSE PRACTITIONER

## 2020-08-19 RX ORDER — FLUTICASONE PROPIONATE 50 MCG
2 SPRAY, SUSPENSION (ML) NASAL DAILY
Qty: 1 BOTTLE | Refills: 11 | Status: SHIPPED | OUTPATIENT
Start: 2020-08-19 | End: 2022-08-03

## 2020-08-19 RX ORDER — IPRATROPIUM BROMIDE AND ALBUTEROL SULFATE 2.5; .5 MG/3ML; MG/3ML
1 SOLUTION RESPIRATORY (INHALATION) EVERY 4 HOURS PRN
Qty: 360 ML | Refills: 11 | Status: SHIPPED | OUTPATIENT
Start: 2020-08-19 | End: 2021-05-26 | Stop reason: SDUPTHER

## 2020-08-19 RX ORDER — ALBUTEROL SULFATE 90 UG/1
2 AEROSOL, METERED RESPIRATORY (INHALATION) EVERY 4 HOURS PRN
Qty: 1 INHALER | Refills: 11 | Status: SHIPPED | OUTPATIENT
Start: 2020-08-19

## 2020-08-19 RX ORDER — PREDNISONE 10 MG/1
10 TABLET ORAL DAILY
Qty: 30 TABLET | Refills: 3 | Status: SHIPPED | OUTPATIENT
Start: 2020-08-19 | End: 2021-05-26 | Stop reason: SDUPTHER

## 2020-08-19 RX ORDER — LEVOCETIRIZINE DIHYDROCHLORIDE 5 MG/1
5 TABLET, FILM COATED ORAL NIGHTLY
Qty: 30 TABLET | Refills: 11 | Status: SHIPPED | OUTPATIENT
Start: 2020-08-19

## 2020-08-19 NOTE — PROGRESS NOTES
[place] [facility]     Patient: , 60 y.o.   : 1955   Pt of Dr. Edyta Neil     Chief Complaint   Patient presents with    Follow-up     COPD 3 month follow up with no testing        HPI  Emily Stacy is here for 3 month follow up for very severe COPD. 42 pack year smoking history, still smoking but down to 1 pack ever 2- weeks. .  FEV1 20-25, FEV/FVC 55 (good BD response). Decrease from 30/61 in 2018. A1A MM. CT Lung screening with mild emphysema, old granulomas, no nodules. Home 02 at 2-3 Liters with activity and 3 Liters with sleep. Normal sleep study 2018 (147.6 min <88%). Echo, normal EF, DD. RVSP 27. Does best with Nebulizers and continues on Pulmicort with DuoNeb 4-6 times daily with benefit. When last seen was still taking Trelegy with Nebulzers. Resumed Daliresp. Xyzal added to Flonase for allergies. Presents today doing best I have seen since meeting her for the first time in 2020. Last hospitalization or exacerbation was prior to last visit in May. She has better understanding of severity of her disease and has been more compliant with treatment. Having shoulder issues, was placed on Prednisone and had significant benefit with her breathing and energy. Due to C0VID, has not started Pulmonary Rehab. Essentially home bound, she does not go out due to fear of needing home Nebulizer treatment. Current machine is old, large and not portable. Does not benefit with inhaler so does not use. Still smoking but down to 1 pack every 2-3 weeks.  buys as he and daughter smoke. Able to quit for 4 months by having them smoke outside, but not compliant with cold weather. Also struggles with anxiety. MMRC Score: 3-4    Progress History:   Since last visit any new medical issues? No  New ER or hospitlal visits? No  Any new or changes in medicines? No  Using inhalers? Yes   Are they helpful?  Yes   Past Medical hx PMH:  Past Medical History:   Diagnosis Date    CHF (congestive heart failure) (HCC)     COPD (chronic obstructive pulmonary disease) (HCC)     Diabetes mellitus (HCC)     HLD (hyperlipidemia)     Hypertension      SURGICAL HISTORY:  Past Surgical History:   Procedure Laterality Date    APPENDECTOMY       SECTION      FOOT SURGERY      HERNIA REPAIR       SOCIAL HISTORY:  Social History     Tobacco Use    Smoking status: Current Some Day Smoker     Packs/day: 1.00     Years: 42.00     Pack years: 42.00     Start date: 3/22/1978    Smokeless tobacco: Never Used    Tobacco comment: 1 pack every 2-3 weeks   Substance Use Topics    Alcohol use: No    Drug use: No     ALLERGIES:  Allergies   Allergen Reactions    Norco [Hydrocodone-Acetaminophen]      FAMILY HISTORY:History reviewed. No pertinent family history.   CURRENT MEDICATIONS:  Current Outpatient Medications   Medication Sig Dispense Refill    Multiple Vitamins-Minerals (PRESERVISION AREDS 2) CHEW Take by mouth      fluticasone (FLONASE) 50 MCG/ACT nasal spray 2 sprays by Nasal route daily 1 Bottle 11    levocetirizine (XYZAL) 5 MG tablet Take 1 tablet by mouth nightly 30 tablet 11    Roflumilast (DALIRESP) 500 MCG tablet Take 1 tablet by mouth daily 30 tablet 11    albuterol sulfate  (90 Base) MCG/ACT inhaler Inhale 2 puffs into the lungs every 4 hours as needed for Wheezing or Shortness of Breath 1 Inhaler 11    budesonide (PULMICORT) 0.5 MG/2ML nebulizer suspension Take 2 mLs by nebulization 2 times daily 60 ampule 3    ipratropium-albuterol (DUONEB) 0.5-2.5 (3) MG/3ML SOLN nebulizer solution Inhale 3 mLs into the lungs 4 times daily 360 mL 11    Sodium Chloride-Sodium Bicarb (AYR SALINE NASAL RINSE) 1.57 g PACK 1 packet by Nasal route 2 times daily as needed (sinus congestion) 7 each 1    aspirin 81 MG tablet Take 81 mg by mouth daily      metoprolol tartrate (LOPRESSOR) 25 MG tablet Take 25 mg by mouth 2 times daily  lisinopril-hydrochlorothiazide (PRINZIDE;ZESTORETIC) 20-25 MG per tablet Take 1 tablet by mouth daily      metFORMIN (GLUCOPHAGE) 500 MG tablet Take 500 mg by mouth 2 times daily (with meals) 1,000 mg at supper.  Cholecalciferol (VITAMIN D) 2000 units CAPS capsule Take by mouth      Cyanocobalamin (B-12 PO) Take by mouth      Multiple Vitamins-Minerals (THERAPEUTIC MULTIVITAMIN-MINERALS) tablet Take 1 tablet by mouth daily      pravastatin (PRAVACHOL) 10 MG tablet Take 10 mg by mouth daily      Pseudoephedrine-Guaifenesin (MUCINEX D PO) Take by mouth as needed      Ibuprofen (ADVIL PO) Take by mouth as needed      FUROSEMIDE PO Take 20 mg by mouth        No current facility-administered medications for this visit. Feliz KENNY   General/Constitutional: No recent loss of weight or appetite changes. No fever or chills. HENT: Negative. Eyes: Negative. Upper respiratory tract: Occasional nasal stuffiness and post nasal drip. Lower respiratory tract/ lungs: Occasional cough with clear sputum production. No hemoptysis. Cardiovascular: No palpitations or chest pain. Gastrointestinal: No nausea or vomiting. Neurological: No focal neurologiacal weakness. Extremities: No increased edema. Musculoskeletal: No complaints. Genitourinary: No complaints. Hematological: Negative. Psychiatric/Behavioral: Negative. Skin: No itching. Physical exam   /80 (Site: Left Upper Arm, Position: Sitting, Cuff Size: Medium Adult)   Pulse 107   Temp 99.2 °F (37.3 °C) (Tympanic)   Ht 5' 6\" (1.676 m)   Wt 214 lb 12.8 oz (97.4 kg)   SpO2 98% Comment: on 3 LPM  BMI 34.67 kg/m²      Constitutional: Patient appears moderately built and moderately nourished in no acute distress. Head: Normocephalic and atraumatic. Mouth/Throat: Oropharynx is clear and moist. No oral thrush. Eyes: Conjunctivae are normal. Pupils are equal, round, and reactive to light. No scleral icterus. Neck: Neck supple.  No JVD or tracheal deviation present. Cardiovascular: Regular rate, regular rhythm, S1 and S2 with no murmur. + peripheral edema. Pulmonary/Chest: Normal effort with diminished but clear breath sounds. No wheezing, rales or rhonchi. Normal AP diameter. No stridor. No respiratory distress. Abdominal: Soft. Bowel sounds audible. No distension or tenderness to palpation. Musculoskeletal: Moves all extremities. Lymphadenopathy: No cervical adenopathy. Neurological: Patient is alert and oriented to person, place, and time. No focal deficits. Skin: Skin is warm and dry. No clubbing, no cyanosis. Test results   Lung Nodule Screening     [x] Qualifies    []Does not qualify   [] Declined    [x] Completed                             Assessment      Diagnosis Orders   1. Stage 4 very severe COPD by GOLD classification ([address]  ipratropium-albuterol (DUONEB) 0.5-2.5 (3) MG/3ML SOLN nebulizer solution    Full PFT Study With Bronchodilator    CT CHEST WO CONTRAST    Pulse oximetry, overnight    Blood Gas, Arterial    DME Order for Nebulizer as OP    External Referral To Pulmonary Rehab   2. Chronic respiratory failure with hypoxia (HCC)  Pulse oximetry, overnight    DME Order for Nebulizer as OP    External Referral To Pulmonary Rehab   3. Environmental and seasonal allergies     4. Chronic sinus complaints     5. Cigarette nicotine dependence with other nicotine-induced disorder           Plan   Continue Pulmicort Neb and increase DuoNeb to every 4 hours. Arrange for new Nebulizer machine. Continue Daliresp. Add Prednisone 10 mg daily. Advised of side effects. Monitor blood sugars. Continue Flonase and Xyzal with Nasal Rinse PRN. Add Singulair if needed. Mucinex PRN. Continue 02 at 2-3 Liters with activity and sleep. Has to do Pulmonary Rehab!!! Home BiPAP evaluation: Overnight Pulsox on 3 Liters with ABG. She is due for annual CT Lung screening and Spirometry in October.  We discussed possibility of Crum Lynne but has to

## 2020-08-19 NOTE — PATIENT INSTRUCTIONS
Patient Education        Learning About Benefits From Quitting Smoking  How does quitting smoking make you healthier? If you're thinking about quitting smoking, you may have a few reasons to be smoke-free. Your health may be one of them. · When you quit smoking, you lower your risks for cancer, lung disease, heart attack, stroke, blood vessel disease, and blindness from macular degeneration. · When you're smoke-free, you get sick less often, and you heal faster. You are less likely to get colds, flu, bronchitis, and pneumonia. · As a nonsmoker, you may find that your mood is better and you are less stressed. When and how will you feel healthier? Quitting has real health benefits that start from day 1 of being smoke-free. And the longer you stay smoke-free, the healthier you get and the better you feel. The first hours  · After just 20 minutes, your blood pressure and heart rate go down. That means there's less stress on your heart and blood vessels. · Within 12 hours, the level of carbon monoxide in your blood drops back to normal. That makes room for more oxygen. With more oxygen in your body, you may notice that you have more energy than when you smoked. After 2 weeks  · Your lungs start to work better. · Your risk of heart attack starts to drop. After 1 month  · When your lungs are clear, you cough less and breathe deeper, so it's easier to be active. · Your sense of taste and smell return. That means you can enjoy food more than you have since you started smoking. Over the years  · Over the years, your risks of heart disease, heart attack, and stroke are lower. · After 10 years, your risk of dying from lung cancer is cut by about half. And your risk for many other types of cancer is lower too. How would quitting help others in your life? When you quit smoking, you improve the health of everyone who now breathes in your smoke.   · Their heart, lung, and cancer risks drop, much like yours.  · They are sick less. For babies and small children, living smoke-free means they're less likely to have ear infections, pneumonia, and bronchitis. · If you're a woman who is or will be pregnant someday, quitting smoking means a healthier . · Children who are close to you are less likely to become adult smokers. Where can you learn more? Go to https://[employer]. org and sign in to your [employer] account. Enter [phone] in the [facility] box to learn more about \"Learning About Benefits From Quitting Smoking. \"     If you do not have an account, please click on the \"Sign Up Now\" link. Current as of: 2020               Content Version: 12.5   Healthwise, [employer]. Care instructions adapted under license by [facility] ([facility]). If you have questions about a medical condition or this instruction, always ask your healthcare professional. [place] [address] any warranty or liability for your use of this information. Patient Education        prednisone  Pronunciation:  PRED ni sone  Brand:  Honey  What is the most important information I should know about prednisone? You should not use prednisone if you have a fungal infection anywhere in your body. You should not stop using prednisone suddenly. Follow your doctor's instructions about tapering your dose. What is prednisone? Prednisone is a steroid that reduces inflammation in the body, and also suppresses your immune system. Prednisone is used to treat many different conditions such as hormonal disorders, skin diseases, arthritis, lupus, psoriasis, allergic conditions, ulcerative colitis, Crohn's disease, eye diseases, lung diseases, asthma, tuberculosis, blood cell disorders, kidney disorders, leukemia, lymphoma, multiple sclerosis, organ transplant rejection, swelling from a brain tumor or injury. Prednisone may also be used for purposes not listed in this medication guide.   What upsets your stomach. Measure liquid medicine carefully. Use the dosing syringe provided, or use a medicine dose-measuring device (not a kitchen spoon). Swallow the delayed-release tablet whole and do not crush, chew, or break it. Prednisone can weaken (suppress) your immune system, and you may get an infection more easily. Call your doctor if you have signs of infection (fever, weakness, cold or flu symptoms, skin sores, diarrhea, frequent or recurring illness). If you have major surgery or a severe injury or infection, your prednisone dose needs may change. Make sure any doctor caring for you knows you are using this medicine. If you use this medicine long-term, you may need medical tests and vision exams. In case of emergency, wear or carry medical identification to let others know you use a steroid. You should not stop using prednisone suddenly. Follow your doctor's instructions about tapering your dose. Store at room temperature away from moisture, heat, and light. What happens if I miss a dose? Take the medicine as soon as you can, but skip the missed dose if it is almost time for your next dose. Do not take two doses at one time. What happens if I overdose? Seek emergency medical attention or call the Poison Help line at 1-772.447.3549. High doses or long-term use of prednisone can lead to thinning skin, easy bruising, changes in body fat (especially in your face, neck, back, and waist), increased acne or facial hair, menstrual problems, impotence, or loss of interest in sex. What should I avoid while taking prednisone? Do not receive a \"live\" vaccine while using prednisone. The vaccine may not work as well and may not fully protect you from disease. Live vaccines include measles, mumps, rubella (MMR), polio, rotavirus, typhoid, yellow fever, varicella (chickenpox), zoster (shingles), and nasal flu (influenza) vaccine. Avoid being near people who are sick or have infections.  Call your doctor for preventive treatment if you are exposed to chickenpox or measles. These conditions can be serious or even fatal in people who are using steroid medicine. Avoid drinking alcohol. What are the possible side effects of prednisone? Get emergency medical help if you have signs of an allergic reaction: hives; difficult breathing; swelling of your face, lips, tongue, or throat. Call your doctor at once if you have:  · muscle pain or weakness;  · blurred vision, tunnel vision, eye pain, or seeing halos around lights;  · severe depression, changes in personality, unusual thoughts or behavior;  · bloody or tarry stools, coughing up blood or vomit that looks like coffee grounds;  · swelling, rapid weight gain, feeling short of breath;  · irregular heartbeats;  · severe headache, pounding in your neck or ears;  · decreased adrenal gland hormones --muscle weakness, tiredness, diarrhea, nausea, menstrual changes, skin discoloration, craving salty foods, and feeling light-headed; or  · low potassium level --leg cramps, constipation, irregular heartbeats, fluttering in your chest, increased thirst or urination, numbness or tingling, muscle weakness or limp feeling. Prednisone can affect growth in children. Tell your doctor if your child is not growing at a normal rate while using this medicine. Common side effects may include:  · weight gain (especially in your face or your upper back and torso);  · increased appetite;  · mood changes, trouble sleeping;  · changes in your menstrual periods;  · problems with memory or thought;  · muscle or joint pain;  · weakness;  · headache, dizziness, spinning sensation;  · nausea, bloating, loss of appetite;  · slow wound healing; or  · acne, increased sweating, thinning skin, bruising, pinpoint spots under your skin. This is not a complete list of side effects and others may occur. Call your doctor for medical advice about side effects.  You may report side effects to FDA at 1-800-FDA-1088. What other drugs will affect prednisone? Sometimes it is not safe to use certain medications at the same time. Some drugs can affect your blood levels of other drugs you take, which may increase side effects or make the medications less effective. Tell your doctor about all your current medicines. Many drugs can affect prednisone, especially:  · bupropion;  · cyclosporine;  · digoxin;  · ketoconazole;  · an antibiotic;  · birth control pills or hormone replacement therapy;  · a diuretic or \"water pill\";  · insulin or oral diabetes medicine;  · a blood thinner --warfarin, Coumadin, Jantoven; or  · NSAIDs (nonsteroidal anti-inflammatory drugs) --aspirin, ibuprofen (Advil, Motrin), naproxen (Aleve), celecoxib, diclofenac, indomethacin, meloxicam, and others. This list is not complete and many other drugs may affect prednisone. This includes prescription and over-the-counter medicines, vitamins, and herbal products. Not all possible drug interactions are listed here. Where can I get more information? Your pharmacist can provide more information about prednisone. Remember, keep this and all other medicines out of the reach of children, never share your medicines with others, and use this medication only for the indication prescribed. Every effort has been made to ensure that the information provided by [address] is accurate, up-to-date, and complete, but no guarantee is made to that effect. Drug information contained herein may be time sensitive. [facility] information has been compiled for use by healthcare practitioners and consumers in the [place] and therefore [facility] does not warrant that uses outside of the [place] are appropriate, unless specifically indicated otherwise. [facility] drug information does not endorse drugs, diagnose patients or recommend therapy.  [facility]'s drug information is an informational resource designed to assist licensed healthcare practitioners in caring for their patients and/or to serve consumers viewing this service as a supplement to, and not a substitute for, the expertise, skill, knowledge and judgment of healthcare practitioners. The absence of a warning for a given drug or drug combination in no way should be construed to indicate that the drug or drug combination is safe, effective or appropriate for any given patient. [facility] does not assume any responsibility for any aspect of healthcare administered with the aid of information [facility] provides. The information contained herein is not intended to cover all possible uses, directions, precautions, warnings, drug interactions, allergic reactions, or adverse effects. If you have questions about the drugs you are taking, check with your doctor, nurse or pharmacist.  Copyright 3844-0737 [address]. Version: 10.01. Revision date: 3/28/2019. Care instructions adapted under license by [facility] ([facility]). If you have questions about a medical condition or this instruction, always ask your healthcare professional. [place] [address] any warranty or liability for your use of this information.

## 2020-09-16 ENCOUNTER — CLINICAL DOCUMENTATION (OUTPATIENT)
Dept: PULMONOLOGY | Age: 65
End: 2020-09-16

## 2021-05-26 ENCOUNTER — OFFICE VISIT (OUTPATIENT)
Dept: PULMONOLOGY | Age: 66
End: 2021-05-26
Payer: COMMERCIAL

## 2021-05-26 VITALS
DIASTOLIC BLOOD PRESSURE: 76 MMHG | SYSTOLIC BLOOD PRESSURE: 122 MMHG | BODY MASS INDEX: 37.48 KG/M2 | OXYGEN SATURATION: 92 % | WEIGHT: 233.2 LBS | TEMPERATURE: 99.1 F | HEIGHT: 66 IN | HEART RATE: 110 BPM

## 2021-05-26 DIAGNOSIS — J96.11 CHRONIC RESPIRATORY FAILURE WITH HYPOXIA (HCC): ICD-10-CM

## 2021-05-26 DIAGNOSIS — J30.89 ENVIRONMENTAL AND SEASONAL ALLERGIES: ICD-10-CM

## 2021-05-26 DIAGNOSIS — Z79.52 LONG TERM SYSTEMIC STEROID USER: Primary | ICD-10-CM

## 2021-05-26 DIAGNOSIS — R53.81 PHYSICAL DECONDITIONING: ICD-10-CM

## 2021-05-26 DIAGNOSIS — F17.218 CIGARETTE NICOTINE DEPENDENCE WITH OTHER NICOTINE-INDUCED DISORDER: ICD-10-CM

## 2021-05-26 DIAGNOSIS — J44.9 STAGE 4 VERY SEVERE COPD BY GOLD CLASSIFICATION (HCC): ICD-10-CM

## 2021-05-26 DIAGNOSIS — R63.5 WEIGHT GAIN: ICD-10-CM

## 2021-05-26 PROCEDURE — G0296 VISIT TO DETERM LDCT ELIG: HCPCS | Performed by: NURSE PRACTITIONER

## 2021-05-26 PROCEDURE — 99215 OFFICE O/P EST HI 40 MIN: CPT | Performed by: NURSE PRACTITIONER

## 2021-05-26 RX ORDER — IPRATROPIUM BROMIDE AND ALBUTEROL SULFATE 2.5; .5 MG/3ML; MG/3ML
1 SOLUTION RESPIRATORY (INHALATION) EVERY 4 HOURS PRN
Qty: 540 ML | Refills: 11 | Status: SHIPPED | OUTPATIENT
Start: 2021-05-26 | End: 2022-08-03 | Stop reason: SDUPTHER

## 2021-05-26 RX ORDER — PREDNISONE 10 MG/1
10 TABLET ORAL DAILY
Qty: 90 TABLET | Refills: 3 | Status: SHIPPED | OUTPATIENT
Start: 2021-05-26 | End: 2022-08-15

## 2021-05-26 RX ORDER — BUDESONIDE 0.5 MG/2ML
500 INHALANT ORAL 2 TIMES DAILY
Qty: 60 AMPULE | Refills: 3 | Status: ON HOLD | OUTPATIENT
Start: 2021-05-26 | End: 2021-07-27 | Stop reason: HOSPADM

## 2021-05-26 NOTE — PROGRESS NOTES
[facility]     Patient: Christopher Whiteside, 73 y.o.   : 1955    Pt of Dr. eJff Marie   Patient presents with    Follow-up     COPD 9 month pulmonary follow up PFT 2020        HPI  Tony Horner is here for follow up for her severe COPD with PFT   Never started pulmonary rehab due to [place] - she feels too weak to do it now   Feels that her breathing has declined gradually over the last year   Per Talia's last note she does the best on nebulized solutions   Using her Duoneb every 4 hours, admits to sometime using more frequently. Using Symbicort without benefit, does not recall being prescribed Pulmicort   Some day smoker, about 2 cigarettes per week. Normal sleep study 2018 (147.6 min <88%). Echo, normal EF, DD. RVSP 27. Any recent exacerbations that have required oral atb or steroids No  Any new medical issues since last visit : No  Current Inhalers: Symbicort, Duoneb, Daliresp, Singulair, flonase, xyzal   Supposed to be on 10 mg prednisone daily but script    Denies known h/o COVID 19, has not been vaccinated   Last 6MWT: 2020- required 2LPM with activity , had overnight pulse ox on 2020-- Andrew Valdovinos CNP increased O2 to 4LPM after this result. Abg reviewed- did not qualify for home BIPAP for her COPD     FEV1 20-25, FEV/FVC 55 (good BD response). Decrease from 30/61 in 2018.   A1A MM.      SOCIAL HISTORY:  Social History     Tobacco Use    Smoking status: Current Some Day Smoker     Packs/day: 1.00     Years: 42.00     Pack years: 42.00     Start date: 3/22/1978    Smokeless tobacco: Never Used    Tobacco comment: 1 pack every 2-3 weeks   Substance Use Topics    Alcohol use: No    Drug use: No         CURRENT MEDICATIONS:  Current Outpatient Medications   Medication Sig Dispense Refill    ipratropium-albuterol (DUONEB) 0.5-2.5 (3) MG/3ML SOLN nebulizer solution Inhale 3 mLs into the lungs every 4 .    ROS   Review of Systems   Constitutional: Positive for activity change, fatigue and unexpected weight change. Negative for chills and fever. HENT: Negative. Eyes: Negative. Respiratory: Positive for cough, shortness of breath and wheezing. Cardiovascular: Negative for chest pain, palpitations and leg swelling. Gastrointestinal: Negative for abdominal pain, diarrhea, nausea and vomiting. Genitourinary: Negative. Musculoskeletal: Positive for arthralgias, back pain and gait problem. Skin: Negative. Hematological: Does not bruise/bleed easily. Psychiatric/Behavioral: Positive for sleep disturbance. Negative for suicidal ideas. The patient is nervous/anxious. Physical exam   /76 (Site: Left Upper Arm, Position: Sitting)   Pulse 110   Temp 99.1 °F (37.3 °C)   Ht 5' 6\" (1.676 m)   Wt 233 lb 3.2 oz (105.8 kg)   SpO2 92% Comment: on 4 liters  BMI 37.64 kg/m²      Wt Readings from Last 3 Encounters:   05/26/21 233 lb 3.2 oz (105.8 kg)   08/19/20 214 lb 12.8 oz (97.4 kg)   05/13/20 221 lb (100.2 kg)     Physical Exam  Vitals and nursing note reviewed. Constitutional:       General: She is not in acute distress. Appearance: She is obese. Interventions: Nasal cannula in place. Comments: Presents in wheelchair    HENT:      Mouth/Throat:      Lips: Pink. Mouth: Mucous membranes are moist.      Pharynx: Oropharynx is clear. No oropharyngeal exudate or posterior oropharyngeal erythema. Eyes:      Conjunctiva/sclera: Conjunctivae normal.   Neck:      Vascular: No JVD. Cardiovascular:      Rate and Rhythm: Normal rate and regular rhythm. Heart sounds: No murmur heard. No friction rub. Pulmonary:      Effort: Pulmonary effort is normal. No accessory muscle usage or respiratory distress. Breath sounds: Decreased air movement (throughout all lung fields ) present. No wheezing, rhonchi or rales. Chest:      Chest wall: No tenderness. Musculoskeletal:      Right lower leg: No edema. Left lower leg: No edema. Skin:     General: Skin is warm and dry. Capillary Refill: Capillary refill takes less than 2 seconds. Nails: There is no clubbing. Neurological:      Mental Status: She is alert. Psychiatric:         Mood and Affect: Mood normal.         Behavior: Behavior normal.         Thought Content: Thought content normal.         Judgment: Judgment normal.          Test results   Lung Nodule Screening     [x] Qualifies    []Does not qualify   [] Declined    [x] Completed 2019                   Very severe COPD: PFT stable compared to 2019    Assessment      Diagnosis Orders   1. Long term systemic steroid user  predniSONE (DELTASONE) 10 MG tablet    DEXA BONE DENSITY 2 SITES   2. Stage 4 very severe COPD by GOLD classification ([facility])  ipratropium-albuterol (DUONEB) 0.5-2.5 (3) MG/3ML SOLN nebulizer solution    budesonide (PULMICORT) 0.5 MG/2ML nebulizer suspension    albuterol-ipratropium (COMBIVENT RESPIMAT)  MCG/ACT AERS inhaler    predniSONE (DELTASONE) 10 MG tablet   3. Cigarette nicotine dependence with other nicotine-induced disorder  CT LUNG SCREENING    OK VISIT TO DISCUSS LUNG CA SCREEN W LDCT    CT Lung Screen (Annual)   4. Chronic respiratory failure with hypoxia ([facility])     5. Physical deconditioning     6. Environmental and seasonal allergies     7. Weight gain           Plan    -declines pulmonary rehab   -continue oxygen at 2lpm with activity   -renewed script to resume 10 mg prednisone daily   She was detailed about mechanism of action of drug along with associated side effects including but not limited to development of steroid induced diabetes mellitus, cataracts, decreased immune response, glucoma, osteoporosis and weight gain.  She agreed to take the risk and medication.  -Baseline DEXA bone scan  -continue OTC vit D/calcium supp  -continue Pulmicort / Duoneb  -add Combivent to use when away from home- in life expectancy of < 10 years, or that would preclude treatment of an abnormality identified on CT, should not be screened due to lack of benefit.     To obtain maximal benefit from this screening, smoking cessation and long-term abstinence from smoking is critical

## 2021-05-26 NOTE — PATIENT INSTRUCTIONS
Discontinue use of your Symbicort Inhaler. Adding a new nebulizer solution called budedsonide ( Pulmicort) this is to be used every 12 hours - RINSE AND SPIT AFTER USE    Continue to use Duoneb every 4 hours in nebulizer,  I have also prescribed an inhaler called Combivent - this is the portable version of the Duoneb , do not use both at same time. Use one or the other every 4 hours     You need to do pulmonary rehab to build strength and will be required for Endobronchial Valve surgery , let me know if you would like the consult    Has to stop smoking, not a surgical candidate for any Lung procedure to improve breathing until quit smoking for at least 3 months completely     Contact your local  on Aging to see about Meals on Wheels     Consider home health services. Need to have DEXA bone scan to assess for osteoporosis. Starting Prednisone 10 mg / day every day    Need to continue Vit D / Calcium supplement         Patient Education        Stopping Smoking: Care Instructions  Your Care Instructions     Cigarette smokers crave the nicotine in cigarettes. Giving it up is much harder than simply changing a habit. Your body has to stop craving the nicotine. It is hard to quit, but you can do it. There are many tools that people use to quit smoking. You may find that combining tools works best for you. There are several steps to quitting. First you get ready to quit. Then you get support to help you. After that, you learn new skills and behaviors to become a nonsmoker. For many people, a necessary step is getting and using medicine. Your doctor will help you set up the plan that best meets your needs. You may want to attend a smoking cessation program to help you quit smoking. When you choose a program, look for one that has proven success. Ask your doctor for ideas.  You will greatly increase your chances of success if you take medicine as well as get counseling or join a cessation program.  Some of the changes you feel when you first quit tobacco are uncomfortable. Your body will miss the nicotine at first, and you may feel short-tempered and grumpy. You may have trouble sleeping or concentrating. Medicine can help you deal with these symptoms. You may struggle with changing your smoking habits and rituals. The last step is the tricky one: Be prepared for the smoking urge to continue for a time. This is a lot to deal with, but keep at it. You will feel better. Follow-up care is a key part of your treatment and safety. Be sure to make and go to all appointments, and call your doctor if you are having problems. It's also a good idea to know your test results and keep a list of the medicines you take. How can you care for yourself at home? · Ask your family, friends, and coworkers for support. You have a better chance of quitting if you have help and support. · Join a support group, such as Nicotine Anonymous, for people who are trying to quit smoking. · Consider signing up for a smoking cessation program, such as the American Lung Association's Freedom from Smoking program.  · Get text messaging support. Go to the website at www.smokefree. gov to sign up for the [facility] program.  · Set a quit date. Pick your date carefully so that it is not right in the middle of a big deadline or stressful time. Once you quit, do not even take a puff. Get rid of all ashtrays and lighters after your last cigarette. Clean your house and your clothes so that they do not smell of smoke. · Learn how to be a nonsmoker. Think about ways you can avoid those things that make you reach for a cigarette. ? Avoid situations that put you at greatest risk for smoking. For some people, it is hard to have a drink with friends without smoking. For others, they might skip a coffee break with coworkers who smoke. ? Change your daily routine. Take a different route to work or eat a meal in a different place. · Cut down on stress.  Calm yourself or release tension by doing an activity you enjoy, such as reading a book, taking a hot bath, or gardening. · Talk to your doctor or pharmacist about nicotine replacement therapy, which replaces the nicotine in your body. You still get nicotine but you do not use tobacco. Nicotine replacement products help you slowly reduce the amount of nicotine you need. These products come in several forms, many of them available over-the-counter:  ? Nicotine patches  ? Nicotine gum and lozenges  ? Nicotine inhaler  · Ask your doctor about bupropion (Wellbutrin) or varenicline (Chantix), which are prescription medicines. They do not contain nicotine. They help you by reducing withdrawal symptoms, such as stress and anxiety. · Some people find hypnosis, acupuncture, and massage helpful for ending the smoking habit. · Eat a healthy diet and get regular exercise. Having healthy habits will help your body move past its craving for nicotine. · Be prepared to keep trying. Most people are not successful the first few times they try to quit. Do not get mad at yourself if you smoke again. Make a list of things you learned and think about when you want to try again, such as next week, next month, or next year. Where can you learn more? Go to https://[employer]. org and sign in to your [employer] account. Enter N360 in the [facility] box to learn more about \"Stopping Smoking: Care Instructions. \"     If you do not have an account, please click on the \"Sign Up Now\" link. Current as of: March 12, 2020               Content Version: 12.8  © 9212-7636 Healthwise, Incorporated. Care instructions adapted under license by [facility] ([facility]). If you have questions about a medical condition or this instruction, always ask your healthcare professional. [place] [address] any warranty or liability for your use of this information. What is lung cancer screening?   Lung cancer screening is a way in which doctors check the lungs for early signs of cancer in people who have no symptoms of lung cancer. A low-dose CT scan uses much less radiation than a normal CT scan and shows a more detailed image of the lungs than a standard X-ray. The goal of lung cancer screening is to find cancer early, before it has a chance to grow, spread, or cause problems. One large study found that smokers who were screened with low-dose CT scans were less likely to die of lung cancer than those who were screened with standard X-ray. Below is a summary of the things you need to know regarding screening for lung cancer with low-dose computed tomography (LDCT). This is a screening program that involves routine annual screening with LDCT studies of the lung. The LDCTs are done using low-dose radiation that is not thought to increase your cancer risk. If you have other serious medical conditions (other cancers, congestive heart failure) that limit your life expectancy to less than 10 years, you should not undergo lung cancer screening with LDCT. The chance is 20%-60% that the LDCT result will show abnormalities. This would require additional testing which could include repeat imaging or even invasive procedures. Most (about 95%) of \"abnormal\" LDCT results are false in the sense that no lung cancer is ultimately found. Additionally, some (about 10%) of the cancers found would not affect your life expectancy, even if undetected and untreated. If you are still smoking, the single most important thing that you can do to reduce your risk of dying of lung cancer is to quit. For this screening to be covered by Medicare and most other insurers, strict criteria must be met. If you do not meet these criteria, but still wish to undergo LDCT testing, you will be required to sign a waiver indicating your willingness to pay for the scan.

## 2021-05-27 ASSESSMENT — ENCOUNTER SYMPTOMS
WHEEZING: 1
EYES NEGATIVE: 1
ABDOMINAL PAIN: 0
COUGH: 1
SHORTNESS OF BREATH: 1
BACK PAIN: 1
DIARRHEA: 0
VOMITING: 0
NAUSEA: 0

## 2021-07-17 PROBLEM — J96.90 RESPIRATORY FAILURE (HCC): Status: ACTIVE | Noted: 2021-07-17

## 2021-07-17 NOTE — PROGRESS NOTES
Patient is a 73year old from [address] [place] [address] unit with Dr Kevin Durand transferring. Patient with history of DM, severe COPD was admitted to them 7/10/2021 with respiratory failure and intubated. Patient extubated 7/13/2021 and has been SOB since. Patient was on 6L NC and is now on Bipap. CT chest widespread bronchitis with complete collapse of RML with narrowing of rhonchi. Patient receiving Rocephin, steroids and bronchodilators. Reported nothing else significant on work up but feel as though transfer here would be helpful so she could have a bronch. Dr Kevin Durand aware there are no beds and will not have a bed most likely tonight. , RR 29, 91% Bipap (Dr Kevin Durand unsure of bipap settings), /89, afebrile. Patient accepted in transfer to stepdown under Argenis Acevedo NP/hospitalist when a bed becomes available.

## 2021-07-18 ENCOUNTER — HOSPITAL ENCOUNTER (INPATIENT)
Age: 66
LOS: 9 days | Discharge: NURSING, REHAB OR LONG-TERM CARE | DRG: 871 | End: 2021-07-27
Attending: INTERNAL MEDICINE | Admitting: INTERNAL MEDICINE
Payer: COMMERCIAL

## 2021-07-18 ENCOUNTER — APPOINTMENT (OUTPATIENT)
Dept: GENERAL RADIOLOGY | Age: 66
DRG: 871 | End: 2021-07-18
Attending: INTERNAL MEDICINE
Payer: COMMERCIAL

## 2021-07-18 DIAGNOSIS — J98.11 COLLAPSE OF RIGHT LUNG: Primary | ICD-10-CM

## 2021-07-18 DIAGNOSIS — J44.9 CHRONIC OBSTRUCTIVE PULMONARY DISEASE, UNSPECIFIED COPD TYPE (HCC): ICD-10-CM

## 2021-07-18 DIAGNOSIS — J96.11 CHRONIC RESPIRATORY FAILURE WITH HYPOXIA AND HYPERCAPNIA (HCC): ICD-10-CM

## 2021-07-18 DIAGNOSIS — R93.89 ABNORMAL CT OF THE CHEST: ICD-10-CM

## 2021-07-18 DIAGNOSIS — J44.9 STAGE 4 VERY SEVERE COPD BY GOLD CLASSIFICATION (HCC): ICD-10-CM

## 2021-07-18 DIAGNOSIS — J96.12 CHRONIC RESPIRATORY FAILURE WITH HYPOXIA AND HYPERCAPNIA (HCC): ICD-10-CM

## 2021-07-18 LAB
ALBUMIN SERPL-MCNC: 3.1 G/DL (ref 3.5–5.1)
ALLEN TEST: POSITIVE
ALLEN TEST: POSITIVE
ALP BLD-CCNC: 77 U/L (ref 38–126)
ALT SERPL-CCNC: 118 U/L (ref 11–66)
ANION GAP SERPL CALCULATED.3IONS-SCNC: 6 MEQ/L (ref 8–16)
AST SERPL-CCNC: 35 U/L (ref 5–40)
BASE EXCESS (CALCULATED): 16.7 MMOL/L (ref -2.5–2.5)
BASE EXCESS (CALCULATED): 18.3 MMOL/L (ref -2.5–2.5)
BASOPHILS # BLD: 0.3 %
BASOPHILS ABSOLUTE: 0 THOU/MM3 (ref 0–0.1)
BILIRUB SERPL-MCNC: 0.2 MG/DL (ref 0.3–1.2)
BUN BLDV-MCNC: 27 MG/DL (ref 7–22)
CALCIUM SERPL-MCNC: 9.2 MG/DL (ref 8.5–10.5)
CHLORIDE BLD-SCNC: 96 MEQ/L (ref 98–111)
CO2: 40 MEQ/L (ref 23–33)
COLLECTED BY:: ABNORMAL
COLLECTED BY:: ABNORMAL
CREAT SERPL-MCNC: 0.5 MG/DL (ref 0.4–1.2)
DEVICE: ABNORMAL
DEVICE: ABNORMAL
EKG ATRIAL RATE: 81 BPM
EKG P AXIS: 71 DEGREES
EKG P-R INTERVAL: 152 MS
EKG Q-T INTERVAL: 370 MS
EKG QRS DURATION: 124 MS
EKG QTC CALCULATION (BAZETT): 429 MS
EKG R AXIS: 55 DEGREES
EKG T AXIS: 39 DEGREES
EKG VENTRICULAR RATE: 81 BPM
EOSINOPHIL # BLD: 0 %
EOSINOPHILS ABSOLUTE: 0 THOU/MM3 (ref 0–0.4)
ERYTHROCYTE [DISTWIDTH] IN BLOOD BY AUTOMATED COUNT: 14.1 % (ref 11.5–14.5)
ERYTHROCYTE [DISTWIDTH] IN BLOOD BY AUTOMATED COUNT: 54.2 FL (ref 35–45)
GFR SERPL CREATININE-BSD FRML MDRD: > 90 ML/MIN/1.73M2
GLUCOSE BLD-MCNC: 178 MG/DL (ref 70–108)
GLUCOSE BLD-MCNC: 192 MG/DL (ref 70–108)
GLUCOSE BLD-MCNC: 198 MG/DL (ref 70–108)
GLUCOSE BLD-MCNC: 221 MG/DL (ref 70–108)
GLUCOSE BLD-MCNC: 283 MG/DL (ref 70–108)
HCO3: 48 MMOL/L (ref 23–28)
HCO3: 49 MMOL/L (ref 23–28)
HCT VFR BLD CALC: 41.2 % (ref 37–47)
HEMOGLOBIN: 12.2 GM/DL (ref 12–16)
IFIO2: 50
IFIO2: 50
IMMATURE GRANS (ABS): 0.33 THOU/MM3 (ref 0–0.07)
IMMATURE GRANULOCYTES: 2.1 %
LACTIC ACID: 1.4 MMOL/L (ref 0.5–2)
LYMPHOCYTES # BLD: 4.5 %
LYMPHOCYTES ABSOLUTE: 0.7 THOU/MM3 (ref 1–4.8)
MAGNESIUM: 2.1 MG/DL (ref 1.6–2.4)
MCH RBC QN AUTO: 31.2 PG (ref 26–33)
MCHC RBC AUTO-ENTMCNC: 29.6 GM/DL (ref 32.2–35.5)
MCV RBC AUTO: 105.4 FL (ref 81–99)
MODE: ABNORMAL
MODE: ABNORMAL
MONOCYTES # BLD: 5.1 %
MONOCYTES ABSOLUTE: 0.8 THOU/MM3 (ref 0.4–1.3)
MRSA SCREEN RT-PCR: NEGATIVE
NUCLEATED RED BLOOD CELLS: 0 /100 WBC
O2 SATURATION: 92 %
O2 SATURATION: 94 %
PCO2: 100 MMHG (ref 35–45)
PCO2: 91 MMHG (ref 35–45)
PH BLOOD GAS: 7.29 (ref 7.35–7.45)
PH BLOOD GAS: 7.34 (ref 7.35–7.45)
PHOSPHORUS: 4 MG/DL (ref 2.4–4.7)
PLATELET # BLD: 254 THOU/MM3 (ref 130–400)
PMV BLD AUTO: 11.4 FL (ref 9.4–12.4)
PO2: 77 MMHG (ref 71–104)
PO2: 83 MMHG (ref 71–104)
POTASSIUM SERPL-SCNC: 4.8 MEQ/L (ref 3.5–5.2)
PROCALCITONIN: 0.09 NG/ML (ref 0.01–0.09)
RBC # BLD: 3.91 MILL/MM3 (ref 4.2–5.4)
SEG NEUTROPHILS: 88 %
SEGMENTED NEUTROPHILS ABSOLUTE COUNT: 13.9 THOU/MM3 (ref 1.8–7.7)
SET PEEP: 10 MMHG
SET PEEP: 8 MMHG
SET PRESS SUPP: 26 CMH2O
SET PRESS SUPP: 6 CMH2O
SODIUM BLD-SCNC: 142 MEQ/L (ref 135–145)
SOURCE, BLOOD GAS: ABNORMAL
SOURCE, BLOOD GAS: ABNORMAL
TOTAL PROTEIN: 5.6 G/DL (ref 6.1–8)
VANCOMYCIN RESISTANT ENTEROCOCCUS: NEGATIVE
WBC # BLD: 15.8 THOU/MM3 (ref 4.8–10.8)

## 2021-07-18 PROCEDURE — 94640 AIRWAY INHALATION TREATMENT: CPT

## 2021-07-18 PROCEDURE — 2060000000 HC ICU INTERMEDIATE R&B

## 2021-07-18 PROCEDURE — 87086 URINE CULTURE/COLONY COUNT: CPT

## 2021-07-18 PROCEDURE — 71045 X-RAY EXAM CHEST 1 VIEW: CPT

## 2021-07-18 PROCEDURE — 87040 BLOOD CULTURE FOR BACTERIA: CPT

## 2021-07-18 PROCEDURE — 87641 MR-STAPH DNA AMP PROBE: CPT

## 2021-07-18 PROCEDURE — 36415 COLL VENOUS BLD VENIPUNCTURE: CPT

## 2021-07-18 PROCEDURE — 83605 ASSAY OF LACTIC ACID: CPT

## 2021-07-18 PROCEDURE — 6370000000 HC RX 637 (ALT 250 FOR IP): Performed by: STUDENT IN AN ORGANIZED HEALTH CARE EDUCATION/TRAINING PROGRAM

## 2021-07-18 PROCEDURE — 87186 SC STD MICRODIL/AGAR DIL: CPT

## 2021-07-18 PROCEDURE — 84145 PROCALCITONIN (PCT): CPT

## 2021-07-18 PROCEDURE — 99222 1ST HOSP IP/OBS MODERATE 55: CPT | Performed by: FAMILY MEDICINE

## 2021-07-18 PROCEDURE — 6360000002 HC RX W HCPCS: Performed by: STUDENT IN AN ORGANIZED HEALTH CARE EDUCATION/TRAINING PROGRAM

## 2021-07-18 PROCEDURE — 87500 VANOMYCIN DNA AMP PROBE: CPT

## 2021-07-18 PROCEDURE — 87081 CULTURE SCREEN ONLY: CPT

## 2021-07-18 PROCEDURE — 80053 COMPREHEN METABOLIC PANEL: CPT

## 2021-07-18 PROCEDURE — 84100 ASSAY OF PHOSPHORUS: CPT

## 2021-07-18 PROCEDURE — 87077 CULTURE AEROBIC IDENTIFY: CPT

## 2021-07-18 PROCEDURE — 93010 ELECTROCARDIOGRAM REPORT: CPT | Performed by: NUCLEAR MEDICINE

## 2021-07-18 PROCEDURE — 99223 1ST HOSP IP/OBS HIGH 75: CPT | Performed by: INTERNAL MEDICINE

## 2021-07-18 PROCEDURE — 51702 INSERT TEMP BLADDER CATH: CPT

## 2021-07-18 PROCEDURE — 6360000002 HC RX W HCPCS: Performed by: INTERNAL MEDICINE

## 2021-07-18 PROCEDURE — 82803 BLOOD GASES ANY COMBINATION: CPT

## 2021-07-18 PROCEDURE — 36600 WITHDRAWAL OF ARTERIAL BLOOD: CPT

## 2021-07-18 PROCEDURE — 82948 REAGENT STRIP/BLOOD GLUCOSE: CPT

## 2021-07-18 PROCEDURE — 94660 CPAP INITIATION&MGMT: CPT

## 2021-07-18 PROCEDURE — 93005 ELECTROCARDIOGRAM TRACING: CPT | Performed by: STUDENT IN AN ORGANIZED HEALTH CARE EDUCATION/TRAINING PROGRAM

## 2021-07-18 PROCEDURE — 2580000003 HC RX 258: Performed by: STUDENT IN AN ORGANIZED HEALTH CARE EDUCATION/TRAINING PROGRAM

## 2021-07-18 PROCEDURE — 83735 ASSAY OF MAGNESIUM: CPT

## 2021-07-18 PROCEDURE — 85025 COMPLETE CBC W/AUTO DIFF WBC: CPT

## 2021-07-18 RX ORDER — PRAVASTATIN SODIUM 10 MG
10 TABLET ORAL DAILY
Status: DISCONTINUED | OUTPATIENT
Start: 2021-07-18 | End: 2021-07-27 | Stop reason: HOSPADM

## 2021-07-18 RX ORDER — FUROSEMIDE 20 MG/1
20 TABLET ORAL DAILY
Status: DISCONTINUED | OUTPATIENT
Start: 2021-07-18 | End: 2021-07-27 | Stop reason: HOSPADM

## 2021-07-18 RX ORDER — NICOTINE POLACRILEX 4 MG
15 LOZENGE BUCCAL PRN
Status: DISCONTINUED | OUTPATIENT
Start: 2021-07-18 | End: 2021-07-27 | Stop reason: HOSPADM

## 2021-07-18 RX ORDER — LISINOPRIL 20 MG/1
20 TABLET ORAL DAILY
Status: DISCONTINUED | OUTPATIENT
Start: 2021-07-18 | End: 2021-07-27 | Stop reason: HOSPADM

## 2021-07-18 RX ORDER — SODIUM CHLORIDE 0.9 % (FLUSH) 0.9 %
10 SYRINGE (ML) INJECTION PRN
Status: DISCONTINUED | OUTPATIENT
Start: 2021-07-18 | End: 2021-07-27 | Stop reason: HOSPADM

## 2021-07-18 RX ORDER — BUDESONIDE 0.5 MG/2ML
500 INHALANT ORAL 2 TIMES DAILY
Status: DISCONTINUED | OUTPATIENT
Start: 2021-07-18 | End: 2021-07-20

## 2021-07-18 RX ORDER — HYDROCHLOROTHIAZIDE 25 MG/1
25 TABLET ORAL DAILY
Status: DISCONTINUED | OUTPATIENT
Start: 2021-07-18 | End: 2021-07-27 | Stop reason: HOSPADM

## 2021-07-18 RX ORDER — ARFORMOTEROL TARTRATE 15 UG/2ML
15 SOLUTION RESPIRATORY (INHALATION) 2 TIMES DAILY
Status: DISCONTINUED | OUTPATIENT
Start: 2021-07-18 | End: 2021-07-20

## 2021-07-18 RX ORDER — NAPROXEN 250 MG/1
250 TABLET ORAL 2 TIMES DAILY WITH MEALS
Status: COMPLETED | OUTPATIENT
Start: 2021-07-18 | End: 2021-07-19

## 2021-07-18 RX ORDER — SODIUM CHLORIDE 9 MG/ML
25 INJECTION, SOLUTION INTRAVENOUS PRN
Status: DISCONTINUED | OUTPATIENT
Start: 2021-07-18 | End: 2021-07-27 | Stop reason: HOSPADM

## 2021-07-18 RX ORDER — DEXTROSE MONOHYDRATE 50 MG/ML
100 INJECTION, SOLUTION INTRAVENOUS PRN
Status: DISCONTINUED | OUTPATIENT
Start: 2021-07-18 | End: 2021-07-27 | Stop reason: HOSPADM

## 2021-07-18 RX ORDER — SODIUM CHLORIDE 0.9 % (FLUSH) 0.9 %
5-40 SYRINGE (ML) INJECTION EVERY 12 HOURS SCHEDULED
Status: DISCONTINUED | OUTPATIENT
Start: 2021-07-18 | End: 2021-07-27 | Stop reason: HOSPADM

## 2021-07-18 RX ORDER — LISINOPRIL AND HYDROCHLOROTHIAZIDE 25; 20 MG/1; MG/1
1 TABLET ORAL DAILY
Status: DISCONTINUED | OUTPATIENT
Start: 2021-07-18 | End: 2021-07-18 | Stop reason: CLARIF

## 2021-07-18 RX ORDER — IPRATROPIUM BROMIDE AND ALBUTEROL SULFATE 2.5; .5 MG/3ML; MG/3ML
1 SOLUTION RESPIRATORY (INHALATION) EVERY 4 HOURS
Status: DISCONTINUED | OUTPATIENT
Start: 2021-07-18 | End: 2021-07-18

## 2021-07-18 RX ORDER — ASPIRIN 81 MG/1
81 TABLET, CHEWABLE ORAL DAILY
Status: DISCONTINUED | OUTPATIENT
Start: 2021-07-18 | End: 2021-07-27 | Stop reason: HOSPADM

## 2021-07-18 RX ORDER — IPRATROPIUM BROMIDE AND ALBUTEROL SULFATE 2.5; .5 MG/3ML; MG/3ML
1 SOLUTION RESPIRATORY (INHALATION) EVERY 6 HOURS PRN
Status: DISCONTINUED | OUTPATIENT
Start: 2021-07-18 | End: 2021-07-27 | Stop reason: HOSPADM

## 2021-07-18 RX ORDER — DEXTROSE MONOHYDRATE 25 G/50ML
12.5 INJECTION, SOLUTION INTRAVENOUS PRN
Status: DISCONTINUED | OUTPATIENT
Start: 2021-07-18 | End: 2021-07-27 | Stop reason: HOSPADM

## 2021-07-18 RX ORDER — PREDNISONE 20 MG/1
40 TABLET ORAL DAILY
Status: COMPLETED | OUTPATIENT
Start: 2021-07-18 | End: 2021-07-22

## 2021-07-18 RX ORDER — IPRATROPIUM BROMIDE AND ALBUTEROL SULFATE 2.5; .5 MG/3ML; MG/3ML
1 SOLUTION RESPIRATORY (INHALATION) 4 TIMES DAILY
Status: DISCONTINUED | OUTPATIENT
Start: 2021-07-18 | End: 2021-07-27 | Stop reason: HOSPADM

## 2021-07-18 RX ADMIN — BUDESONIDE 500 MCG: 0.5 INHALANT RESPIRATORY (INHALATION) at 16:50

## 2021-07-18 RX ADMIN — CEFTRIAXONE SODIUM 1000 MG: 1 INJECTION, POWDER, FOR SOLUTION INTRAMUSCULAR; INTRAVENOUS at 06:41

## 2021-07-18 RX ADMIN — ENOXAPARIN SODIUM 40 MG: 40 INJECTION, SOLUTION INTRAVENOUS; SUBCUTANEOUS at 09:15

## 2021-07-18 RX ADMIN — IPRATROPIUM BROMIDE AND ALBUTEROL SULFATE 1 AMPULE: .5; 3 SOLUTION RESPIRATORY (INHALATION) at 05:45

## 2021-07-18 RX ADMIN — SODIUM CHLORIDE, PRESERVATIVE FREE 10 ML: 5 INJECTION INTRAVENOUS at 09:16

## 2021-07-18 RX ADMIN — ROFLUMILAST 500 MCG: 500 TABLET ORAL at 09:15

## 2021-07-18 RX ADMIN — IPRATROPIUM BROMIDE AND ALBUTEROL SULFATE 1 AMPULE: .5; 3 SOLUTION RESPIRATORY (INHALATION) at 15:10

## 2021-07-18 RX ADMIN — FUROSEMIDE 20 MG: 20 TABLET ORAL at 09:15

## 2021-07-18 RX ADMIN — INSULIN LISPRO 1 UNITS: 100 INJECTION, SOLUTION INTRAVENOUS; SUBCUTANEOUS at 09:16

## 2021-07-18 RX ADMIN — PRAVASTATIN SODIUM 10 MG: 10 TABLET ORAL at 09:15

## 2021-07-18 RX ADMIN — NAPROXEN 250 MG: 250 TABLET ORAL at 23:23

## 2021-07-18 RX ADMIN — BUDESONIDE 500 MCG: 0.5 INHALANT RESPIRATORY (INHALATION) at 08:00

## 2021-07-18 RX ADMIN — IPRATROPIUM BROMIDE AND ALBUTEROL SULFATE 1 AMPULE: .5; 3 SOLUTION RESPIRATORY (INHALATION) at 11:30

## 2021-07-18 RX ADMIN — METOPROLOL TARTRATE 25 MG: 25 TABLET, FILM COATED ORAL at 21:04

## 2021-07-18 RX ADMIN — METOPROLOL TARTRATE 25 MG: 25 TABLET, FILM COATED ORAL at 09:15

## 2021-07-18 RX ADMIN — PREDNISONE 40 MG: 20 TABLET ORAL at 09:15

## 2021-07-18 RX ADMIN — IPRATROPIUM BROMIDE AND ALBUTEROL SULFATE 1 AMPULE: .5; 3 SOLUTION RESPIRATORY (INHALATION) at 08:00

## 2021-07-18 RX ADMIN — IPRATROPIUM BROMIDE AND ALBUTEROL SULFATE 1 AMPULE: .5; 3 SOLUTION RESPIRATORY (INHALATION) at 20:35

## 2021-07-18 RX ADMIN — ASPIRIN 81 MG: 81 TABLET, CHEWABLE ORAL at 09:15

## 2021-07-18 RX ADMIN — INSULIN LISPRO 2 UNITS: 100 INJECTION, SOLUTION INTRAVENOUS; SUBCUTANEOUS at 17:50

## 2021-07-18 RX ADMIN — HYDROCHLOROTHIAZIDE 25 MG: 25 TABLET ORAL at 09:15

## 2021-07-18 RX ADMIN — SODIUM CHLORIDE, PRESERVATIVE FREE 10 ML: 5 INJECTION INTRAVENOUS at 21:04

## 2021-07-18 RX ADMIN — AZITHROMYCIN DIHYDRATE 500 MG: 500 INJECTION, POWDER, LYOPHILIZED, FOR SOLUTION INTRAVENOUS at 09:29

## 2021-07-18 RX ADMIN — LISINOPRIL 20 MG: 20 TABLET ORAL at 09:15

## 2021-07-18 RX ADMIN — INSULIN LISPRO 1 UNITS: 100 INJECTION, SOLUTION INTRAVENOUS; SUBCUTANEOUS at 12:48

## 2021-07-18 RX ADMIN — ARFORMOTEROL TARTRATE 15 MCG: 15 SOLUTION RESPIRATORY (INHALATION) at 16:50

## 2021-07-18 ASSESSMENT — PAIN SCALES - GENERAL
PAINLEVEL_OUTOF10: 0
PAINLEVEL_OUTOF10: 0
PAINLEVEL_OUTOF10: 4
PAINLEVEL_OUTOF10: 0

## 2021-07-18 ASSESSMENT — PAIN DESCRIPTION - PAIN TYPE: TYPE: CHRONIC PAIN

## 2021-07-18 NOTE — PLAN OF CARE
Problem: Impaired respiratory status  Goal: Clear lung sounds  7/18/2021 1012 by Clem Lira RCP  Outcome: Ongoing  duoneb q4  Remains on bipap 26/10 at 50% fio2  Pulmicort bid

## 2021-07-18 NOTE — H&P
History & Physical       Patient: Brenda Padron  YOB: 1955    MRN: [id number]     Acct: [de-identified]    PCP: SUSAN Rodrigues    Date of Admission: 2021    Date of Service: Patient seen / examined on 21 and admitted to Inpatient with expected LOS greater than two midnights due to medical therapy. ASSESSMENT / PLAN:  Acute on chronic hypoxic and hypercapnic respiratory failure secondary to COPD exacerbation, GOLD stage 4  On BiPaP 14/8, 50% FiO2 - AB.29, CO2 100, PO2 77, HCO3 48 BE 17 Sat 92%   Pt meets 3/3 GOLD criteria, alert & oriented   CXR: mild flattening of diaphragm, pulmonary congestion and right middle lobe infiltrate   -BiPap increased to 26/10, repeat ABG at 8a  -Consult pulmonology for bronchoscopy of possible mucus plug (reason for transfer)  -Start prednisone and duoneb treatments     Community acquired pneumonia  Leukocytic, tachypneic and tachycardic with CXR findings, afebrile  -Started Zithromax and Rocephin, de-escalate as indicated  -Pneumonia panel, MRSA, VRE pending  -Daily CBC, BMP  -Blood cultures x2 pending    Symptomatic primary HTN   Hypertensive on admission, complaining of headache   -Continued lisinopril-hydrochlorothiazide, metoprolol   -Monitor BP    NIDDM2    Hyperglycemic, steroids likely contributing   -Held Metformin  -Started SSI with hypoglycemia protocol in place    Diastolic heart failure with EF 60-65%  Compensated   -Continued lasix and ASA    HLD   -Continued Pravachol    Chief Complaint:  Shortness of Breath     History of Present Illness:  73 y.o. female PMHx COPD, primary HTN, NIDDM 2, diastolic heart failure with EF 60 to 65%, HLD-- presents to [facility] with a chief complaint of shortness of breath on BiPAP. History obtained from the patient. Patient transferred from [facility] inpatient unit for bronchoscopy for mucous plug.   Patient complains of shortness of breath and COPD exacerbation ever since being extubated on 2021. Reports increase sputum production described as yellow and thick. Denies blood in sputum. Reports associated headache. Denies chest pain, abdominal pain, dysuria, chills, fever, dizziness, lightheadedness. Past Medical History:    Past Medical History:   Diagnosis Date    CHF (congestive heart failure) ([facility] [address]     COPD (chronic obstructive pulmonary disease) (HCC)     Diabetes mellitus (HCC)     Diastolic dysfunction     Environmental and seasonal allergies     HLD (hyperlipidemia)     Hypertension      Past Surgical History:    Past Surgical History:   Procedure Laterality Date    APPENDECTOMY       SECTION      FOOT SURGERY      HERNIA REPAIR        Medications Prior to Admission:   No current facility-administered medications on file prior to encounter.      Current Outpatient Medications on File Prior to Encounter   Medication Sig Dispense Refill    ipratropium-albuterol (DUONEB) 0.5-2.5 (3) MG/3ML SOLN nebulizer solution Inhale 3 mLs into the lungs every 4 hours as needed for Shortness of Breath 540 mL 11    budesonide (PULMICORT) 0.5 MG/2ML nebulizer suspension Take 2 mLs by nebulization 2 times daily 60 ampule 3    albuterol-ipratropium (COMBIVENT RESPIMAT)  MCG/ACT AERS inhaler Inhale 1 puff into the lungs every 6 hours as needed for Wheezing or Shortness of Breath 3 Inhaler 3    albuterol sulfate  (90 Base) MCG/ACT inhaler Inhale 2 puffs into the lungs every 4 hours as needed for Wheezing or Shortness of Breath 1 Inhaler 11    levocetirizine (XYZAL) 5 MG tablet Take 1 tablet by mouth nightly 30 tablet 11    fluticasone (FLONASE) 50 MCG/ACT nasal spray 2 sprays by Nasal route daily 1 Bottle 11    Roflumilast (DALIRESP) 500 MCG tablet Take 1 tablet by mouth daily 30 tablet 11    aspirin 81 MG tablet Take 81 mg by mouth daily      metoprolol tartrate (LOPRESSOR) 25 MG tablet Take 25 mg by mouth 2 times daily      lisinopril-hydrochlorothiazide (PRINZIDE;ZESTORETIC) 20-25 MG per tablet Take 1 tablet by mouth daily      metFORMIN (GLUCOPHAGE) 500 MG tablet Take 500 mg by mouth 2 times daily (with meals) 1,000 mg at supper.  Cholecalciferol (VITAMIN D) 2000 units CAPS capsule Take by mouth      Cyanocobalamin (B-12 PO) Take by mouth      pravastatin (PRAVACHOL) 10 MG tablet Take 10 mg by mouth daily      Pseudoephedrine-Guaifenesin (MUCINEX D PO) Take by mouth as needed       Ibuprofen (ADVIL PO) Take by mouth as needed      FUROSEMIDE PO Take 20 mg by mouth       Sodium Chloride-Sodium Bicarb (AYR SALINE NASAL RINSE) 1.57 g PACK 1 packet by Nasal route 2 times daily as needed (sinus congestion) (Patient not taking: Reported on 5/26/2021) 7 each 1    Multiple Vitamins-Minerals (THERAPEUTIC MULTIVITAMIN-MINERALS) tablet Take 1 tablet by mouth daily       Allergies:   Excedrin extra strength [aspirin-acetaminophen-caffeine] and Norco [hydrocodone-acetaminophen]    Social History:   Social History     Socioeconomic History    Marital status:      Spouse name: Not on file    Number of children: Not on file    Years of education: Not on file    Highest education level: Not on file   Occupational History    Not on file   Tobacco Use    Smoking status: Current Some Day Smoker     Packs/day: 1.00     Years: 42.00     Pack years: 42.00     Start date: 3/22/1978    Smokeless tobacco: Never Used    Tobacco comment: 1 pack every 2-3 weeks   Substance and Sexual Activity    Alcohol use: No    Drug use: No    Sexual activity: Not on file   Other Topics Concern    Not on file   Social History Narrative    Not on file     Social Determinants of Health     Financial Resource Strain:     Difficulty of Paying Living Expenses:    Food Insecurity:     Worried About Running Out of Food in the Last Year:     Ran Out of Food in the Last Year:    Transportation Needs:     Lack of Transportation (Medical):      Lack of Transportation (Non-Medical):    Physical Activity:     Days of Exercise per Week:     Minutes of Exercise per Session:    Stress:     Feeling of Stress :    Social Connections:     Frequency of Communication with Friends and Family:     Frequency of Social Gatherings with Friends and Family:     Attends [employer] Services:     Active Member of Clubs or Organizations:     Attends Club or Organization Meetings:     Marital Status:    Intimate Partner Violence:     Fear of Current or Ex-Partner:     Emotionally Abused:     Physically Abused:     Sexually Abused:      Family History:    No family history on file. REVIEW OF SYSTEMS:  A 14-point ROS was obtained and negative, with the exception of pertinent positives as stated in the HPI. PHYSICAL EXAM:  Vitals:    07/18/21 0015 07/18/21 0029 07/18/21 0043   BP: (!) 181/74     Pulse: 92     Resp: 27  24   Temp: 97 °F (36.1 °C)     TempSrc: Axillary     SpO2: 98%     Weight:  237 lb (107.5 kg)    Height:  5' 7.5\" (1.715 m)      General appearance: Alert /ill-appearing Cooperative. Uncomfortable with BiPAP. HEENT: Normocephalic / atraumatic. PERRL. EOM intact. Conjunctivae appear normal.  Neck: Supple. No JVD. Respiratory: Labored on BiPaP, minimal air movement throughout lungs bilaterally wheezing present   Cardiovascular: RRR. Normal S1/S2. No murmurs / rubs / gallops. Abdomen: Soft / non-tender /slightly distended. BS present. Musculoskeletal: No cyanosis or edema. Skin: Warm / dry. Normal turgor. No pallor / diaphoresis. Neurologic: A/O x 3. Speech normal. Answers questions appropriately. CN intact. No obvious focal neurologic deficits. Psychiatric: Thought content / judgment / insight appear appropriate. Capillary refill: Brisk bilaterally. Peripheral pulses: Normal bilaterally. Labs:   No results found for this visit on 07/18/21.     EKG / Radiology:     EKG:  Reviewed by me --RBBB, normal sinus rhythm    CXR:   Reviewed by me -- mild flattening of diaphragm, pulmonary congestion and right middle lobe infiltrate     No results found. FEN/GI/DVT:  IVF: None  Electrolytes: Monitor and replace per protocols  Diet: NPO  GI PPX: No  DVT Prophylaxis: Lovenox     CODE STATUS:  Full    Active Hospital Problems    Diagnosis Date Noted    Respiratory failure [facility]) [J96.90] 07/17/2021     Thank you SUSAN Pruett for the opportunity to be involved in this patient's care.     Electronically signed by Scottie Lerner DO on 7/18/2021 at 3:21 AM

## 2021-07-18 NOTE — CONSULTS
[facility]      Patient - Alix Street   MRN -  [id number]   [facility]   - 1955      Date of Admission -  2021 12:12 AM  Date of evaluation -  2021  Room - -13/013-PAUL York MD Primary Care Physician - SUSAN Loyd     Problem List      Active Hospital Problems    Diagnosis Date Noted    Respiratory failure [facility]) [J96.90] 2021     Reason for Consult    Shortness of breath  HPI   History Obtained From: Patient and electronic medical record. Alix Street is a 73 y.o. female,  PMHx off COPD, CHF,  DM type 2, HTN, HLD, is admitted to the hospital yesterday  for  shortness of breath and possible bronchocopy. Patient transferred from [facility] inpatient unit for bronchoscopy for mucous plug. Patient was being treated for COPD exacerbation for a week at [facility]; intubated and extubated on 21. She admits sputum production described as yellow and thick. Additional symptoms are headache, fatigue, abdominal pain. Denies blood in sputum. Reports associated headache. Denies chest pain, abdominal pain, dysuria, chills, fever, dizziness, lightheadedness     She is having shortness of breath: Yes  Onset: worsening past month  Duration: on 4L nasal canula home oxygen for yrs but 1months SOB is worsening. Functional status prior to beginning of symptoms: with NC Oxygen half block/s on level ground. Current functional capacity on level ground: 0 block/s on level ground. She can climb steps: No  She admits to orthopnea. She is having cough: Yes  Duration of cough: for 7 days. Her cough is associated with sputum production: Yes   The sputum color: yellow  Hemoptysis:No  Diurnal variation: None.      Worse in the morning    She is having chest pain: yes  Duration:Days:dull,   Onset:gradual.  Location:predominantly on left parasternal  Nature/Quality:dull  Assessment:intermittent. Radiation:nonradiating  Scale:3/10  Relation ship to breathing: unchanged with inspiration.         PMHx   Past Medical History      Diagnosis Date    CHF (congestive heart failure) (HCC)     COPD (chronic obstructive pulmonary disease) (HCC)     Diabetes mellitus ([facility] [address]     Diastolic dysfunction     Environmental and seasonal allergies     HLD (hyperlipidemia)     Hypertension       Past Surgical History        Procedure Laterality Date    APPENDECTOMY       SECTION      FOOT SURGERY      HERNIA REPAIR       Meds    Current Medications    sodium chloride flush  5-40 mL Intravenous 2 times per day    enoxaparin  40 mg Subcutaneous Daily    azithromycin  500 mg Intravenous Q24H    cefTRIAXone (ROCEPHIN) IV  1,000 mg Intravenous Q24H    ipratropium-albuterol  1 ampule Inhalation Q4H    predniSONE  40 mg Oral Daily    pravastatin  10 mg Oral Daily    insulin lispro  0-6 Units Subcutaneous TID WC    insulin lispro  0-3 Units Subcutaneous Nightly    aspirin  81 mg Oral Daily    furosemide  20 mg Oral Daily    metoprolol tartrate  25 mg Oral BID    budesonide  500 mcg Nebulization BID    Roflumilast  500 mcg Oral Daily    lisinopril  20 mg Oral Daily    And    hydroCHLOROthiazide  25 mg Oral Daily     sodium chloride flush, sodium chloride, ipratropium-albuterol, glucose, dextrose, glucagon (rDNA), dextrose  IV Drips/Infusions   sodium chloride      dextrose       Home Medications  Medications Prior to Admission: ipratropium-albuterol (DUONEB) 0.5-2.5 (3) MG/3ML SOLN nebulizer solution, Inhale 3 mLs into the lungs every 4 hours as needed for Shortness of Breath  budesonide (PULMICORT) 0.5 MG/2ML nebulizer suspension, Take 2 mLs by nebulization 2 times daily  albuterol-ipratropium (COMBIVENT RESPIMAT)  MCG/ACT AERS inhaler, Inhale 1 puff into the lungs every 6 hours as needed for Wheezing or Shortness of Breath  albuterol sulfate  (90 Base) MCG/ACT inhaler, Inhale 2 puffs into the lungs every 4 hours as needed for Wheezing or Shortness of Breath  levocetirizine (XYZAL) 5 MG tablet, Take 1 tablet by mouth nightly  fluticasone (FLONASE) 50 MCG/ACT nasal spray, 2 sprays by Nasal route daily  Roflumilast (DALIRESP) 500 MCG tablet, Take 1 tablet by mouth daily  aspirin 81 MG tablet, Take 81 mg by mouth daily  metoprolol tartrate (LOPRESSOR) 25 MG tablet, Take 25 mg by mouth 2 times daily  lisinopril-hydrochlorothiazide (PRINZIDE;ZESTORETIC) 20-25 MG per tablet, Take 1 tablet by mouth daily  metFORMIN (GLUCOPHAGE) 500 MG tablet, Take 500 mg by mouth 2 times daily (with meals) 1,000 mg at supper.   Cholecalciferol (VITAMIN D) 2000 units CAPS capsule, Take by mouth  Cyanocobalamin (B-12 PO), Take by mouth  pravastatin (PRAVACHOL) 10 MG tablet, Take 10 mg by mouth daily  Pseudoephedrine-Guaifenesin (MUCINEX D PO), Take by mouth as needed   Ibuprofen (ADVIL PO), Take by mouth as needed  FUROSEMIDE PO, Take 20 mg by mouth   Sodium Chloride-Sodium Bicarb (AYR SALINE NASAL RINSE) 1.57 g PACK, 1 packet by Nasal route 2 times daily as needed (sinus congestion) (Patient not taking: Reported on 5/26/2021)  Multiple Vitamins-Minerals (THERAPEUTIC MULTIVITAMIN-MINERALS) tablet, Take 1 tablet by mouth daily  Diet    Diet NPO  Allergies    Excedrin extra strength [aspirin-acetaminophen-caffeine] and Norco [hydrocodone-acetaminophen]  Social History     Social History     Socioeconomic History    Marital status:      Spouse name: Not on file    Number of children: Not on file    Years of education: Not on file    Highest education level: Not on file   Occupational History    Not on file   Tobacco Use    Smoking status: Current Some Day Smoker     Packs/day: 1.00     Years: 42.00     Pack years: 42.00     Start date: 3/22/1978    Smokeless tobacco: Never Used    Tobacco comment: 1 pack every 2-3 weeks   Substance and Sexual Activity  Alcohol use: No    Drug use: No    Sexual activity: Not on file   Other Topics Concern    Not on file   Social History Narrative    Not on file     Social Determinants of Health     Financial Resource Strain:     Difficulty of Paying Living Expenses:    Food Insecurity:     Worried About Running Out of Food in the Last Year:     [address] in the Last Year:    Transportation Needs:     Lack of Transportation (Medical):  Lack of Transportation (Non-Medical):    Physical Activity:     Days of Exercise per Week:     Minutes of Exercise per Session:    Stress:     Feeling of Stress :    Social Connections:     Frequency of Communication with Friends and Family:     Frequency of Social Gatherings with Friends and Family:     Attends [employer] Services:     Active Member of Clubs or Organizations:     Attends Club or Organization Meetings:     Marital Status:    Intimate Partner Violence:     Fear of Current or Ex-Partner:     Emotionally Abused:     Physically Abused:     Sexually Abused:      Family History    No family history on file. Sleep History    Never diagnosed with sleep apnea in the past.  Occupational history   Occupation:  She is current working: No  Type of profession: retired. History of tobacco smoking:Yes  Amount of tobacco smokin.0 PPD. Years of tobacco smokin. Quit smoking: Yes.               Quit year:few yrs ago   Current smoker: No.       History of recreational or IV drug use in the past:NO     History of exposure to coal mines/coal dust: NO  History of exposure to foundry dust/welding: NO  History of exposure to quarry/silica/sandblasting: NO  History of exposure to asbestos/working with breaks/ships: NO  History of exposure to farm dust: NO  History of recent travel to long distances: NO  History of exposure to birds, pigeons, or chickens in the past:NO  History of pulmonary embolism in the past: No     denies       History of DVT in the past:No        [] Right lower extremity   [] Left lower extremity. [place] of systems   General/Constitutional:  Admits mild headache. Denies fever, dizziness, seizures. HENT:  Denies recent head trauma  Eyes: denies recent visionchanges, eye pain or discharge  Upper respiratory tract: denies sore throat, nosebleeding  Lower respiratory tract/ lungs: Positive for SOB. Pt is on BiPAP, cough with small amount thick, yellow sputum. Positive for chest pain that is dull, left parasternal, 3/10 currently, was 7/10 at [facility], non-radiating, worsens on exertion, Pt had similar but less intensive chest pain in the past when she had COPD exacerbation. Denies hemoptysis. Cardiovascular:Positive for chest pain that is dull, left parasternal, 3/10 currently, was 7/10 at [facility], non-radiating, worsens on exertion, Pt had similar but less intensive chest pain in the past when she had COPD exacerbation. Denies heart palpitations, cyanosis   Gastrointestinal: admits mild abdominal pain. Denies diarrhea, constipation  Neurological: denies recent weakness, vision or hearing changes. Extremities: denies cyanosis   Musculoskeletal: denies muscle weakness  Genitourinary: admits disuria  Psychiatric/Behavioral: denies recent mood changes      Vitals     height is 5' 7.5\" (1.715 m) and weight is 237 lb (107.5 kg). Her axillary temperature is 97.1 °F (36.2 °C). Her blood pressure is 145/83 (abnormal) and her pulse is 91. Her respiration is 24 and oxygen saturation is 96%. Body mass index is 36.57 kg/m². SUPPLEMENTAL O2:       I/O        Intake/Output Summary (Last 24 hours) at 7/18/2021 0751  Last data filed at 7/18/2021 0356  Gross per 24 hour   Intake 100 ml   Output 500 ml   Net -400 ml     I/O last 3 completed shifts:   In: 100 [P.O.:100]  Out: 500 [Urine:500]   Patient Vitals for the past 96 hrs (Last 3 readings):   Weight   07/18/21 0029 237 lb (107.5 kg)       Exam   Nursing note and vitals reviewed. General appearance: Alert /ill-appearing Cooperative. In  Distress due to BiPAP. HEENT: Normocephalic / atraumatic. PERRL. EOM intact. Conjunctivae appear normal.  Neck: Supple. No JVD. Respiratory: Labored on BiPaP, breath sounds are diminished on lower areas  Bilaterally. End-expiratory wheezing present    Cardiovascular: RRR. Normal S1/S2. No murmurs / rubs / gallops. Abdomen: Mild pain on palpation in epigastric region but overall abdomen soft /  /slightly distended. Naida Och sign is negative, no CVA tenderness. Passing gas. Genitourinary: On Magana catheter  Musculoskeletal: muscle strength +4 on LE/UE  Skin:  dry. Normal turgor. Neurologic: . Speech normal. Answers questions appropriately. CN intact. No obvious focal neurologic deficits. Capillary refill: Brisk bilaterally. Extremities: +2 bilaterally on radial artery, on LE +2 pitting edema, no cyanosis noted on LE/UE  Psychiatric: alert, oriented 3x3, cooperative    Labs  - Old records and notes have been reviewed in [facility]   ABG  Lab Results   Component Value Date    PH 7.29 07/18/2021    PO2 77 07/18/2021    PCO2 100 07/18/2021    HCO3 48 07/18/2021    O2SAT 92 07/18/2021     Lab Results   Component Value Date    IFIO2 50 07/18/2021    MODE CPAP/PS 07/18/2021    SETPEEP 8.0 07/18/2021     CBC  Recent Labs     07/18/21  0336   WBC 15.8*   RBC 3.91*   HGB 12.2   HCT 41.2   .4*   MCH 31.2   MCHC 29.6*      MPV 11.4      BMP  Recent Labs     07/18/21  0336      K 4.8   CL 96*   CO2 40*   BUN 27*   CREATININE 0.5   GLUCOSE 192*   MG 2.1   PHOS 4.0   CALCIUM 9.2     LFT  Recent Labs     07/18/21  0336   AST 35   *   BILITOT 0.2*   ALKPHOS 77     TROP  No results found for: TROPONINT  BNP  No results for input(s): BNP in the last 72 hours. Lactic Acid  Recent Labs     07/18/21  0508   LACTA 1.4     INR  No results for input(s): INR, PROTIME in the last 72 hours.   PTT  No results for input(s): APTT in the last 72 hours. Glucose  No results for input(s): POCGLU in the last 72 hours. UA No results for input(s): SPECGRAV, PHUR, COLORU, CLARITYU, MUCUS, PROTEINU, BLOODU, RBCUA, WBCUA, BACTERIA, NITRU, GLUCOSEU, BILIRUBINUR, UROBILINOGEN, KETUA, LABCAST, LABCASTTY, AMORPHOS in the last 72 hours. Invalid input(s): CRYSTALS. PFTs         Sleep studies   Pt stated that she had sleep study a yr ago and never diagnosed with sleep apnea    Cultures    pending    EKG   Normal sinus rhythm  Right bundle branch block  Abnormal ECG  No previous ECGs available  Echocardiogram   n/a    Radiology    CXR 7/18/2021  Findings:   No pleural effusion. Heart size normal.   No acute fracture. Impression   Impression:   There is increased groundglass alveolar opacity in the right heart margin    related to the medial segment of the right middle lobe. The lungs are    otherwise clear. The right middle lobe opacity is consistent with early acute middle lobe    alveolar pulmonary infection. CT Scans 10/22/2019  CT Lung screening. Negative for acute changes and pulmonary nodules. Assessment:Plan     Acute on chronic hypoxic and hypercapnic respiratory failure secondary to COPD exacerbation.  -Positive for - SOB worsening, on BiPAP;ABG- PCO2 91, pH-7.34, HCO3 49  - keep BiPAP , monitoring O2sat,  -Continue antibiotics, steroids, bronchodilators.   -No current indications for bronchoscopy (Pt is educated about this)  -Pt is informed about smoking cessation. Community acquired pneumonia  -Positive for SOB, malaisia, leukocytosis, CXR infiltrate on right middle lobe  - MRSA and  VRE PCRs negative  -Blood cultures x2, pending    COPD exacerbation. GOLD 4 stage  -after discharge PFTs  -Home BiPAP eval    DM type 2     HTN    \"Thank you for asking us to see this patient\"    Questions and concerns addressed.     Electronically signed by   Terrance Alvarez DO on 7/18/2021 at 7:54 AM

## 2021-07-18 NOTE — PLAN OF CARE
Plan of Care Update    Name: Eloise Opitz  : 1955  MRN: [id number]  DATE: 21    History of Present Illness: Eloise Opitz is a 73 y.o. female with a PMHx of severe COPD, HTN, non-insulin-dependent diabetes mellitus type 2, heart failure with preserved ejection fraction, and HLD who presents to [facility] with chief complaint of shortness of breath on BiPAP. She was a transfer from [facility] inpatient unit for potential bronchoscopy due to mucous plugging. Patient complains of shortness of breath, wheezing currently. Assessment & Plan    Acute on chronic respiratory failure witih hypoxia/hypercapnia 2/2 COPD exacerbation, GOLD 4 with FEV1 20%, Active.: On BiPaP 26/10, 50% FiO2 -  CXR showed pulmonary congestion and right middle lobe infiltrate with mild flattening of diaphgragm. Pulmonary consulted with no indication for bronchoscopy currently. AB.29, CO2 100, PO2 77, HCO3 48 BE 17 Sat 92%   Repeat ABG -7.34, CO2 91, PO2 83, HCO3 49, base excess 18.3, sat 94%     - Continue Duonebs every 6 hours PRN. - Continue Bipap at 26/10, 50% FiO2. Respiratory currently managing.   - Pulmicort twice daily. - Continue Prednisone 40mg daily for total of 5 days    Sepsis (POA) 2/2 Community acquired pneumonia, Active.: SIRS 3/4 Positive present on admission with likely source of infection. Presenting vital signs were blood pressure 181/74, temperature 97, respiratory rate 27, 92 bpm.  WBCs 15.8. Initial lactic acid was 1.4. Portable chest x-ray demonstrates increased groundglass alveolar opacity in the right heart margin related to the medial segment of the right middle lobe. Opacities consistent with early acute middle lobe alveolar pulmonary infection.   VRE negative   - Continue  Zithromax and Rocephin and deescalate when indicated   - Pending Pneumonia panel, MRSA   - Daily CBC, BMP   - Blood cultures x2 pending     Symptomatic primary HTN, Resolved: Noted to be hypertensive on admission with complaints of headache. Continued on lisinopril-hydrochlorothiazide and metoprolol. Current blood pressure staying stable in the 130s/60s   - Continue to monitor vitals     Noninsulin-dependent diabetes mellitus type 2, Chronic/Stable: Hyperglycemic upon presentation with most recent  POC glucose at 198. Likely confounded by concomitant corticosteroid use. - Continue to hold metformin   - Continue low dose SSI with hypoglycemia protocol     Heart failure with preserved ejection fraction,  EF 60-65%, Chronic: noted. Continue Lasix.       Hx of HLD, Stable: Noted. Continue Pravachol      Physical Exam    General appearance: Ill appearing, with moderate respiratory difficulty noted on Bipap. Appears stated age, cooperative. HEENT: Normocephalic / atraumatic. PERRLA. EOMI. Conjunctivae appear normal.  Neck: Supple. No JVD. Trachea midline. Respiratory: Currently on Bipap 26/10; 0.5 FiO2. Diffuse wheezing noted. Diminished breath sounds throughout. Cardiovascular: S1/S2 present with RRR without murmur, rub, gallops. Abdomen: Soft, non-tender without rigidity. Bowel sounds present. Musculoskeletal: No cyanosis or edema. Active/Passive ROM 4/4 WNL  Skin: Warm and dry. Normal turgor. Neurologic: Alert and oriented x 3. Speech normal. Answers questions appropriately. Cn II - XII grossly intact with no focal deficits noted  Psychiatric: Thought content, judgment /insight appear appropriate. Capillary refill: Brisk bilaterally. < 3secs  Peripheral pulses: Normal bilaterally.     Electronically signed by Vik Sloan DO on 7/18/2021 at 1:59 PM

## 2021-07-19 ENCOUNTER — TELEPHONE (OUTPATIENT)
Dept: PULMONOLOGY | Age: 66
End: 2021-07-19

## 2021-07-19 ENCOUNTER — APPOINTMENT (OUTPATIENT)
Dept: CT IMAGING | Age: 66
DRG: 871 | End: 2021-07-19
Attending: INTERNAL MEDICINE
Payer: COMMERCIAL

## 2021-07-19 LAB
ALBUMIN SERPL-MCNC: 3.3 G/DL (ref 3.5–5.1)
ALLEN TEST: ABNORMAL
ALP BLD-CCNC: 85 U/L (ref 38–126)
ALT SERPL-CCNC: 118 U/L (ref 11–66)
ANION GAP SERPL CALCULATED.3IONS-SCNC: 6 MEQ/L (ref 8–16)
AST SERPL-CCNC: 35 U/L (ref 5–40)
BASE EXCESS (CALCULATED): 22.3 MMOL/L (ref -2.5–2.5)
BILIRUB SERPL-MCNC: 0.3 MG/DL (ref 0.3–1.2)
BILIRUBIN DIRECT: < 0.2 MG/DL (ref 0–0.3)
BUN BLDV-MCNC: 38 MG/DL (ref 7–22)
CALCIUM SERPL-MCNC: 9.1 MG/DL (ref 8.5–10.5)
CHLORIDE BLD-SCNC: 92 MEQ/L (ref 98–111)
CO2: 42 MEQ/L (ref 23–33)
COLLECTED BY:: ABNORMAL
CREAT SERPL-MCNC: 0.8 MG/DL (ref 0.4–1.2)
DEVICE: ABNORMAL
ERYTHROCYTE [DISTWIDTH] IN BLOOD BY AUTOMATED COUNT: 14.4 % (ref 11.5–14.5)
ERYTHROCYTE [DISTWIDTH] IN BLOOD BY AUTOMATED COUNT: 56.2 FL (ref 35–45)
GFR SERPL CREATININE-BSD FRML MDRD: 72 ML/MIN/1.73M2
GLUCOSE BLD-MCNC: 165 MG/DL (ref 70–108)
GLUCOSE BLD-MCNC: 181 MG/DL (ref 70–108)
GLUCOSE BLD-MCNC: 208 MG/DL (ref 70–108)
GLUCOSE BLD-MCNC: 209 MG/DL (ref 70–108)
GLUCOSE BLD-MCNC: 293 MG/DL (ref 70–108)
HCO3: 52 MMOL/L (ref 23–28)
HCT VFR BLD CALC: 41.5 % (ref 37–47)
HEMOGLOBIN: 12.3 GM/DL (ref 12–16)
IFIO2: 45
MCH RBC QN AUTO: 31.3 PG (ref 26–33)
MCHC RBC AUTO-ENTMCNC: 29.6 GM/DL (ref 32.2–35.5)
MCV RBC AUTO: 105.6 FL (ref 81–99)
O2 SATURATION: 95 %
PCO2: 83 MMHG (ref 35–45)
PH BLOOD GAS: 7.41 (ref 7.35–7.45)
PLATELET # BLD: 244 THOU/MM3 (ref 130–400)
PMV BLD AUTO: 11.6 FL (ref 9.4–12.4)
PO2: 81 MMHG (ref 71–104)
POTASSIUM REFLEX MAGNESIUM: 4.1 MEQ/L (ref 3.5–5.2)
RBC # BLD: 3.93 MILL/MM3 (ref 4.2–5.4)
SARS-COV-2, NAAT: NOT DETECTED
SET PEEP: 10 MMHG
SET PRESS SUPP: 26 CMH2O
SODIUM BLD-SCNC: 140 MEQ/L (ref 135–145)
SOURCE, BLOOD GAS: ABNORMAL
TOTAL PROTEIN: 5.7 G/DL (ref 6.1–8)
WBC # BLD: 13.8 THOU/MM3 (ref 4.8–10.8)

## 2021-07-19 PROCEDURE — 2060000000 HC ICU INTERMEDIATE R&B

## 2021-07-19 PROCEDURE — 36600 WITHDRAWAL OF ARTERIAL BLOOD: CPT

## 2021-07-19 PROCEDURE — 94761 N-INVAS EAR/PLS OXIMETRY MLT: CPT

## 2021-07-19 PROCEDURE — 2580000003 HC RX 258: Performed by: STUDENT IN AN ORGANIZED HEALTH CARE EDUCATION/TRAINING PROGRAM

## 2021-07-19 PROCEDURE — 6370000000 HC RX 637 (ALT 250 FOR IP): Performed by: STUDENT IN AN ORGANIZED HEALTH CARE EDUCATION/TRAINING PROGRAM

## 2021-07-19 PROCEDURE — 51702 INSERT TEMP BLADDER CATH: CPT

## 2021-07-19 PROCEDURE — 36415 COLL VENOUS BLD VENIPUNCTURE: CPT

## 2021-07-19 PROCEDURE — 80048 BASIC METABOLIC PNL TOTAL CA: CPT

## 2021-07-19 PROCEDURE — 2700000000 HC OXYGEN THERAPY PER DAY

## 2021-07-19 PROCEDURE — 85027 COMPLETE CBC AUTOMATED: CPT

## 2021-07-19 PROCEDURE — 94640 AIRWAY INHALATION TREATMENT: CPT

## 2021-07-19 PROCEDURE — 6360000004 HC RX CONTRAST MEDICATION: Performed by: INTERNAL MEDICINE

## 2021-07-19 PROCEDURE — 87804 INFLUENZA ASSAY W/OPTIC: CPT

## 2021-07-19 PROCEDURE — 6360000002 HC RX W HCPCS: Performed by: INTERNAL MEDICINE

## 2021-07-19 PROCEDURE — 87635 SARS-COV-2 COVID-19 AMP PRB: CPT

## 2021-07-19 PROCEDURE — 99233 SBSQ HOSP IP/OBS HIGH 50: CPT | Performed by: INTERNAL MEDICINE

## 2021-07-19 PROCEDURE — 82803 BLOOD GASES ANY COMBINATION: CPT

## 2021-07-19 PROCEDURE — APPSS30 APP SPLIT SHARED TIME 16-30 MINUTES: Performed by: NURSE PRACTITIONER

## 2021-07-19 PROCEDURE — 80076 HEPATIC FUNCTION PANEL: CPT

## 2021-07-19 PROCEDURE — 82948 REAGENT STRIP/BLOOD GLUCOSE: CPT

## 2021-07-19 PROCEDURE — 6360000002 HC RX W HCPCS: Performed by: STUDENT IN AN ORGANIZED HEALTH CARE EDUCATION/TRAINING PROGRAM

## 2021-07-19 PROCEDURE — 71275 CT ANGIOGRAPHY CHEST: CPT

## 2021-07-19 PROCEDURE — 94660 CPAP INITIATION&MGMT: CPT

## 2021-07-19 RX ADMIN — METOPROLOL TARTRATE 25 MG: 25 TABLET, FILM COATED ORAL at 08:22

## 2021-07-19 RX ADMIN — IPRATROPIUM BROMIDE AND ALBUTEROL SULFATE 1 AMPULE: .5; 3 SOLUTION RESPIRATORY (INHALATION) at 01:10

## 2021-07-19 RX ADMIN — FUROSEMIDE 20 MG: 20 TABLET ORAL at 08:21

## 2021-07-19 RX ADMIN — IOPAMIDOL 80 ML: 755 INJECTION, SOLUTION INTRAVENOUS at 21:36

## 2021-07-19 RX ADMIN — ARFORMOTEROL TARTRATE 15 MCG: 15 SOLUTION RESPIRATORY (INHALATION) at 17:23

## 2021-07-19 RX ADMIN — IPRATROPIUM BROMIDE AND ALBUTEROL SULFATE 1 AMPULE: .5; 3 SOLUTION RESPIRATORY (INHALATION) at 17:00

## 2021-07-19 RX ADMIN — LISINOPRIL 20 MG: 20 TABLET ORAL at 08:21

## 2021-07-19 RX ADMIN — NAPROXEN 250 MG: 250 TABLET ORAL at 08:23

## 2021-07-19 RX ADMIN — HYDROCHLOROTHIAZIDE 25 MG: 25 TABLET ORAL at 08:21

## 2021-07-19 RX ADMIN — INSULIN LISPRO 3 UNITS: 100 INJECTION, SOLUTION INTRAVENOUS; SUBCUTANEOUS at 17:17

## 2021-07-19 RX ADMIN — ENOXAPARIN SODIUM 40 MG: 40 INJECTION, SOLUTION INTRAVENOUS; SUBCUTANEOUS at 08:21

## 2021-07-19 RX ADMIN — BUDESONIDE 500 MCG: 0.5 INHALANT RESPIRATORY (INHALATION) at 17:32

## 2021-07-19 RX ADMIN — ARFORMOTEROL TARTRATE 15 MCG: 15 SOLUTION RESPIRATORY (INHALATION) at 07:29

## 2021-07-19 RX ADMIN — IPRATROPIUM BROMIDE AND ALBUTEROL SULFATE 1 AMPULE: .5; 3 SOLUTION RESPIRATORY (INHALATION) at 09:25

## 2021-07-19 RX ADMIN — PREDNISONE 40 MG: 20 TABLET ORAL at 08:21

## 2021-07-19 RX ADMIN — BUDESONIDE 500 MCG: 0.5 INHALANT RESPIRATORY (INHALATION) at 07:30

## 2021-07-19 RX ADMIN — PRAVASTATIN SODIUM 10 MG: 10 TABLET ORAL at 08:21

## 2021-07-19 RX ADMIN — METOPROLOL TARTRATE 25 MG: 25 TABLET, FILM COATED ORAL at 20:58

## 2021-07-19 RX ADMIN — ROFLUMILAST 500 MCG: 500 TABLET ORAL at 08:21

## 2021-07-19 RX ADMIN — CEFTRIAXONE SODIUM 1000 MG: 1 INJECTION, POWDER, FOR SOLUTION INTRAMUSCULAR; INTRAVENOUS at 05:32

## 2021-07-19 RX ADMIN — SODIUM CHLORIDE, PRESERVATIVE FREE 10 ML: 5 INJECTION INTRAVENOUS at 20:58

## 2021-07-19 RX ADMIN — AZITHROMYCIN DIHYDRATE 500 MG: 500 INJECTION, POWDER, LYOPHILIZED, FOR SOLUTION INTRAVENOUS at 06:49

## 2021-07-19 RX ADMIN — ASPIRIN 81 MG: 81 TABLET, CHEWABLE ORAL at 08:21

## 2021-07-19 RX ADMIN — INSULIN LISPRO 1 UNITS: 100 INJECTION, SOLUTION INTRAVENOUS; SUBCUTANEOUS at 08:21

## 2021-07-19 RX ADMIN — INSULIN LISPRO 2 UNITS: 100 INJECTION, SOLUTION INTRAVENOUS; SUBCUTANEOUS at 11:53

## 2021-07-19 RX ADMIN — IPRATROPIUM BROMIDE AND ALBUTEROL SULFATE 1 AMPULE: .5; 3 SOLUTION RESPIRATORY (INHALATION) at 13:33

## 2021-07-19 ASSESSMENT — PAIN SCALES - GENERAL
PAINLEVEL_OUTOF10: 4
PAINLEVEL_OUTOF10: 0

## 2021-07-19 ASSESSMENT — PAIN DESCRIPTION - FREQUENCY: FREQUENCY: CONTINUOUS

## 2021-07-19 ASSESSMENT — PAIN - FUNCTIONAL ASSESSMENT: PAIN_FUNCTIONAL_ASSESSMENT: ACTIVITIES ARE NOT PREVENTED

## 2021-07-19 ASSESSMENT — PAIN DESCRIPTION - PAIN TYPE: TYPE: CHRONIC PAIN

## 2021-07-19 ASSESSMENT — PAIN DESCRIPTION - LOCATION: LOCATION: CHEST;HEAD

## 2021-07-19 ASSESSMENT — PAIN DESCRIPTION - DESCRIPTORS: DESCRIPTORS: HEADACHE

## 2021-07-19 ASSESSMENT — PAIN DESCRIPTION - PROGRESSION: CLINICAL_PROGRESSION: NOT CHANGED

## 2021-07-19 ASSESSMENT — PAIN DESCRIPTION - ONSET: ONSET: ON-GOING

## 2021-07-19 NOTE — PLAN OF CARE
Problem: Skin Integrity:  Goal: Will show no infection signs and symptoms  Description: Will show no infection signs and symptoms  Outcome: Ongoing  Note: Patient receiving IV antibiotics and is showing no increased in body temperature or hypotension. Problem: Skin Integrity:  Goal: Absence of new skin breakdown  Description: Absence of new skin breakdown  Outcome: Ongoing  Note: No new evidence of skin breakdown this shift. Patient is able to reposition herself. Problem: Breathing Pattern - Ineffective:  Goal: Ability to achieve and maintain a regular respiratory rate will improve  Description: Ability to achieve and maintain a regular respiratory rate will improve  Outcome: Ongoing     Problem: Pain:  Description: Pain management should include both nonpharmacologic and pharmacologic interventions. Goal: Control of chronic pain  Description: Control of chronic pain  Outcome: Ongoing  Note: Patient receiving home medication for chronic headaches this shift. Problem: Falls - Risk of:  Goal: Will remain free from falls  Description: Will remain free from falls  Outcome: Ongoing  Note: Patient free from falls this shift with call light within reach, slipper socks on, and bed alarm engaged. Care plan reviewed with patient. Patient verbalizes understanding of the plan of care and contribute to goal setting.

## 2021-07-19 NOTE — CARE COORDINATION
7/19/21, 12:36 PM EDT  DISCHARGE PLANNING EVALUATION:    Russ White       Admitted: 7/18/2021/ Lisbeth 126 day: 1   [facility]: [facility] Reason for admit: Respiratory failure ([address] [J96.90]   PMH:  has a past medical history of CHF (congestive heart failure) ([address] COPD (chronic obstructive pulmonary disease) ([address] Diabetes mellitus ([facility] [address] Diastolic dysfunction, Environmental and seasonal allergies, HLD (hyperlipidemia), and Hypertension. Procedure:   7/18 CXR  There is increased groundglass alveolar opacity in the right heart margin   related to the medial segment of the right middle lobe.  The lungs are   otherwise clear. The right middle lobe opacity is consistent with early acute middle lobe   alveolar pulmonary infection. Barriers to Discharge: From [facility]. Respiratory Failure w history COPD, CHF. CO2 83; await final urine culture, WBC 13.8; monitor. Pulmonary following. 45% BIPAP versus 4L oxygen, IV AB continued  PCP: SUSAN Bullock  Readmission Risk Score: 10%    Patient Goals/Plan/Treatment Preferences: lives w spouse and son; prefers [facility] [place] needs w new BIPAP, has [employer] home oxygen 4L, has nebulizer; collaborated w ROSCOE Hwang  Transportation/Food Security/Housekeeping Addressed:  No issues identified.

## 2021-07-19 NOTE — PLAN OF CARE
Problem: Impaired respiratory status  Goal: Clear lung sounds  7/19/2021 1022 by France Montanez RCP  Outcome: Ongoing  Pt wearing bipap 26/10, will get abg at approx 1230pm  Pt tolerating resp meds well  Duoneb 4x day  Pulmicort bid  Brovana bid  Per pt- she wears 4 lpm nc at home

## 2021-07-19 NOTE — PROGRESS NOTES
Pharmacy consulted to dose Lovenox. Based on patient's age, renal function and weight, lovenox 40mg once daily is an appropriate dose    [employer], Pharm. D., BCPS, [facility] 7/18/2021 9:57 PM

## 2021-07-19 NOTE — PLAN OF CARE
Problem: Skin Integrity:  Goal: Will show no infection signs and symptoms  Description: Will show no infection signs and symptoms  7/19/2021 1100 by Hali Summers RN  Outcome: Ongoing  7/19/2021 0127 by Hetal De Oliveira RN  Outcome: Ongoing  Note: Patient receiving IV antibiotics and is showing no increased in body temperature or hypotension. Goal: Absence of new skin breakdown  Description: Absence of new skin breakdown  7/19/2021 1100 by Hali Summers RN  Outcome: Ongoing  7/19/2021 0127 by Hetal De Oliveira RN  Outcome: Ongoing  Note: No new evidence of skin breakdown this shift. Patient is able to reposition herself. Problem: Breathing Pattern - Ineffective:  Goal: Ability to achieve and maintain a regular respiratory rate will improve  Description: Ability to achieve and maintain a regular respiratory rate will improve  7/19/2021 1100 by Hali Summers RN  Outcome: Ongoing  7/19/2021 0127 by Hetal De Oliveira RN  Outcome: Ongoing     Problem: Pain:  Goal: Control of chronic pain  Description: Control of chronic pain  7/19/2021 1100 by Hali Summers RN  Outcome: Ongoing  7/19/2021 0127 by Hetal De Oliveira RN  Outcome: Ongoing  Note: Patient receiving home medication for chronic headaches this shift. Problem: Falls - Risk of:  Goal: Will remain free from falls  Description: Will remain free from falls  7/19/2021 1100 by Hali Summers RN  Outcome: Ongoing  7/19/2021 0127 by Hetal De Oliveira RN  Outcome: Ongoing  Note: Patient free from falls this shift with call light within reach, slipper socks on, and bed alarm engaged.    Goal: Absence of physical injury  Description: Absence of physical injury  7/19/2021 1100 by Hali Summers RN  Outcome: Ongoing  7/19/2021 0127 by Hetal De Oliveira RN  Outcome: Ongoing     Problem: Impaired respiratory status  Goal: Clear lung sounds  7/19/2021 1100 by Hali Summers RN  Outcome: Ongoing  7/19/2021 1022 by Rei Helms RCP  Outcome: Ongoing  7/19/2021 0127 by Verne Cooks, RN  Outcome: Ongoing

## 2021-07-19 NOTE — TELEPHONE ENCOUNTER
Pft lab called wanted to give results since results are high, for Blood Gas PH 7.41 Pc02 83,P02 81,HC03 52, base excess 22.3,02 Stat 95 and Fy02 45. Results are in under labs.

## 2021-07-19 NOTE — FLOWSHEET NOTE
07/18/21 2116   Provider Notification   Reason for Communication Evaluate   Provider Name Dr. David Preciado   Provider Notification Physician   Method of Communication Secure Message   Response See orders   Notification Time 2120   Patient is complaining of Chronic headache pain 4/10. She has a acetaminophen allergy and usually takes OTC Naproxyn at home. Please advise. Naproxyn ordered every 12 hours.

## 2021-07-19 NOTE — PROGRESS NOTES
(PULMICORT) 0.5 MG/2ML nebulizer suspension, Take 2 mLs by nebulization 2 times daily  albuterol-ipratropium (COMBIVENT RESPIMAT)  MCG/ACT AERS inhaler, Inhale 1 puff into the lungs every 6 hours as needed for Wheezing or Shortness of Breath  albuterol sulfate  (90 Base) MCG/ACT inhaler, Inhale 2 puffs into the lungs every 4 hours as needed for Wheezing or Shortness of Breath  levocetirizine (XYZAL) 5 MG tablet, Take 1 tablet by mouth nightly  fluticasone (FLONASE) 50 MCG/ACT nasal spray, 2 sprays by Nasal route daily  Roflumilast (DALIRESP) 500 MCG tablet, Take 1 tablet by mouth daily  aspirin 81 MG tablet, Take 81 mg by mouth daily  metoprolol tartrate (LOPRESSOR) 25 MG tablet, Take 25 mg by mouth 2 times daily  lisinopril-hydrochlorothiazide (PRINZIDE;ZESTORETIC) 20-25 MG per tablet, Take 1 tablet by mouth daily  metFORMIN (GLUCOPHAGE) 500 MG tablet, Take 500 mg by mouth 2 times daily (with meals) 1,000 mg at supper. Cholecalciferol (VITAMIN D) 2000 units CAPS capsule, Take by mouth  Cyanocobalamin (B-12 PO), Take by mouth  pravastatin (PRAVACHOL) 10 MG tablet, Take 10 mg by mouth daily  Pseudoephedrine-Guaifenesin (MUCINEX D PO), Take by mouth as needed   Ibuprofen (ADVIL PO), Take by mouth as needed  FUROSEMIDE PO, Take 20 mg by mouth   Sodium Chloride-Sodium Bicarb (AYR SALINE NASAL RINSE) 1.57 g PACK, 1 packet by Nasal route 2 times daily as needed (sinus congestion) (Patient not taking: Reported on 5/26/2021)  Multiple Vitamins-Minerals (THERAPEUTIC MULTIVITAMIN-MINERALS) tablet, Take 1 tablet by mouth daily  Diet    ADULT DIET; Regular;  Low Sodium (2 gm); 2000 ml  Allergies    Excedrin extra strength [aspirin-acetaminophen-caffeine] and Norco [hydrocodone-acetaminophen]  Social History     Social History     Socioeconomic History    Marital status:      Spouse name: Not on file    Number of children: Not on file    Years of education: Not on file    Highest education level: Not on exposure to quarry/silica/sandblasting: NO  History of exposure to asbestos/working with breaks/ships: NO  History of exposure to farm dust: NO  History of recent travel to long distances: NO  History of exposure to birds, pigeons, or chickens in the past:NO  History of pulmonary embolism in the past: No     denies       History of DVT in the past:No             Vitals     height is 5' 7.5\" (1.715 m) and weight is 237 lb (107.5 kg). Her oral temperature is 98.1 °F (36.7 °C). Her blood pressure is 115/57 (abnormal) and her pulse is 105. Her respiration is 34 (abnormal) and oxygen saturation is 95%. Body mass index is 36.57 kg/m². SUPPLEMENTAL O2: O2 Flow Rate (L/min): 4 L/min     I/O        Intake/Output Summary (Last 24 hours) at 7/19/2021 1402  Last data filed at 7/19/2021 1052  Gross per 24 hour   Intake 1442.74 ml   Output 750 ml   Net 692.74 ml     I/O last 3 completed shifts: In: 65 [P.O.:650; I.V.:10]  Out: 1575 [Urine:1575]   Patient Vitals for the past 96 hrs (Last 3 readings):   Weight   07/18/21 0029 237 lb (107.5 kg)       Exam   Nursing note and vitals reviewed. General appearance: Alert /ill-appearing Cooperative. On BiPAP. C/o headache  HEENT: Normocephalic / atraumatic. PERRL. EOM intact. Conjunctivae appear normal.  Neck: Supple. No JVD. Respiratory: Labored on BiPaP, breath sounds are diminished on lower areas  Bilaterally. End-expiratory wheezing present    Cardiovascular: RRR. Normal S1/S2. No murmurs / rubs / gallops. Abdomen: Mild pain on palpation in epigastric region but overall abdomen soft /  /slightly distended. Melanie Pizza sign is negative, no CVA tenderness. Passing gas. Genitourinary: On Magana catheter  Musculoskeletal: muscle strength +4 on LE/UE  Skin:  dry. Normal turgor. Neurologic: . Speech normal. Answers questions appropriately. CN intact. No obvious focal neurologic deficits. Capillary refill: Brisk bilaterally.   Extremities: +2 bilaterally on radial artery, on LE +2 pitting edema, no cyanosis noted on LE/UE  Psychiatric: alert, oriented 3x3, cooperative    Labs  - Old records and notes have been reviewed in [facility]   ABG  Lab Results   Component Value Date    PH 7.41 07/19/2021    PO2 81 07/19/2021    PCO2 83 07/19/2021    HCO3 52 07/19/2021    O2SAT 95 07/19/2021     Lab Results   Component Value Date    IFIO2 45 07/19/2021    MODE BiLevel 07/18/2021    SETPEEP 10.0 07/19/2021     CBC  Recent Labs     07/18/21  0336 07/19/21  0543   WBC 15.8* 13.8*   RBC 3.91* 3.93*   HGB 12.2 12.3   HCT 41.2 41.5   .4* 105.6*   MCH 31.2 31.3   MCHC 29.6* 29.6*    244   MPV 11.4 11.6      BMP  Recent Labs     07/18/21  0336 07/19/21  0543    140   K 4.8 4.1   CL 96* 92*   CO2 40* 42*   BUN 27* 38*   CREATININE 0.5 0.8   GLUCOSE 192* 165*   MG 2.1  --    PHOS 4.0  --    CALCIUM 9.2 9.1     LFT  Recent Labs     07/18/21  0336 07/19/21  0543   AST 35 35   * 118*   BILITOT 0.2* 0.3   ALKPHOS 77 85     TROP  No results found for: TROPONINT  BNP  No results for input(s): BNP in the last 72 hours. Lactic Acid  Recent Labs     07/18/21  0508   LACTA 1.4     INR  No results for input(s): INR, PROTIME in the last 72 hours. PTT  No results for input(s): APTT in the last 72 hours. Glucose  Recent Labs     07/18/21  1945 07/19/21  0738 07/19/21  1131   POCGLU 283* 181* 208*     UA No results for input(s): SPECGRAV, PHUR, COLORU, CLARITYU, MUCUS, PROTEINU, BLOODU, RBCUA, WBCUA, BACTERIA, NITRU, GLUCOSEU, BILIRUBINUR, UROBILINOGEN, KETUA, LABCAST, LABCASTTY, AMORPHOS in the last 72 hours. Invalid input(s): CRYSTALS.     PFTs     11/4/2020        Sleep studies   Pt stated that she had sleep study a yr ago and never diagnosed with sleep apnea    Cultures    Urine culture: gram (+) cocci chains  VRE (-)  Blood culture: no growth; prelim    EKG   Normal sinus rhythm  Right bundle branch block  Abnormal ECG  No previous ECGs available  Echocardiogram   2/19/20      Radiology CXR 7/18/2021  Findings:   No pleural effusion. Heart size normal.   No acute fracture. Impression   Impression:   There is increased groundglass alveolar opacity in the right heart margin    related to the medial segment of the right middle lobe. The lungs are    otherwise clear. The right middle lobe opacity is consistent with early acute middle lobe    alveolar pulmonary infection. CT Scans 10/22/2019  CT Lung screening. Negative for acute changes and pulmonary nodules. Assessment:Plan     Acute on chronic hypoxic and hypercapnic respiratory failure secondary to COPD exacerbation.  -Positive for - SOB worsening, on BiPAP;ABG- PCO2 91, pH-7.34, HCO3 49  - keep BiPAP , monitoring O2sat,  -Continue antibiotics, steroids, bronchodilators.   -No current indications for bronchoscopy (Pt is educated about this)  -Pt is informed about smoking cessation. Community acquired pneumonia  -Positive for SOB, malaisia, leukocytosis, CXR infiltrate on right middle lobe  - MRSA and  VRE PCRs negative  -Blood cultures x2, pending    COPD exacerbation. GOLD 4 stage  -after discharge PFTs  -Home BiPAP eval    DM type 2     HTN      -CTA Chest today to re-evaluate RML and check for PE- due to ongoing SOB and diminished lung sounds   -BiPAP 26/10 cm H2O- ABG reviewed from today compensated and CO2 improving- can be used with daytime napping and continuous at bedtime   -Home BiPAP evaluation using Severe COPD  -PFT reviewed   -ECHO reviewed. -Continue Brovana, Pulmicort, and Duonebs QID  -VTE prophylaxis: Lovenox   -ATB per primary  -Acapella/IS discussed and encouraged use   -Pneumonia panel pending   -Sleep study reviewed from 2018   -Covid/Rapid flu ordered per Primary     Plan of car discussed with patient and primary RN  Meds and orders reviewed  Questions and concerns addressed.        Electronically signed by   Calton Ahumada, APRN - CNP on 7/19/2021 at 2:02 PM   Addendum by Dr. Karl Mock, MD:  I have seen and examined the patient independently. Face to face evaluation and examination was performed. The above evaluation and note has been reviewed. Labs and radiographs were reviewed. I Have discussed with IVAN Garcia CNP about this patient in detail. The above assessment and plan has been reviewed. Please see my modifications mentioned below. My modifications:  She remain on BiPAP with full facemask. CT angiogram of chest with contrast was ordered today morning to evaluate for abnormal chest x-ray. It is still pending. Lab Results   Component Value Date    PH 7.41 07/19/2021    PCO2 83 07/19/2021    PO2 81 07/19/2021    HCO3 52 07/19/2021    O2SAT 95 07/19/2021     Lab Results   Component Value Date    IFIO2 45 07/19/2021    MODE BiLevel 07/18/2021    SETPEEP 10.0 07/19/2021     Plan:  Wean patient off BiPAP during the daytime. Continue BiPAP at nighttime. Patient will be evaluated for home BiPAP prior to the discharge. She still smoking until recent hospitalization. She is not a candidate for repeat eval at this time. She is currently on 2 L of oxygen with exercise. She will be considered for pulmonary rehab therapy at the time of discharge  Patient educated to quit smoking as soon as possible. Patient verbalizes understanding of adverse consequences of tobacco smoking.     Sue Sanon MD 7/19/2021 7:29 PM

## 2021-07-19 NOTE — PROGRESS NOTES
Hospitalist Progress Note    Patient:  Cindi Sanz      Unit/Bed:4K-13013-A    YOB: 1955    MRN: [id number]       Acct: [de-identified]     PCP: SUSAN Rosario    Date of Admission: 2021       Assessment/Plan:    Acute on chronic respiratory failure [facility] hypoxia/hypercapnia 2/2 COPD exacerbation, GOLD 4 with FEV1 20%, Active.: On BiPaP 26/10, 0.45 FiO2 -  CXR showed pulmonary congestion and right middle lobe infiltrate with mild flattening of diaphgragm. Pulmonary consulted with no indication for bronchoscopy currently.         -  AB.29, CO2 100, PO2 77, HCO3 48 BE 17 Sat 92%    -  Repeat ABG -7.34, CO2 91, PO2 83, HCO3 49, base excess 18.3, sat 94%       -  AB.41, PCO2 83, PO2 81, HCO3 52, BE 22.3, Sat 95%, FiO2 0.45                - Pulmonology Following with no indication for bronch currently. - Continue Bipap at 26/10, 0.45 FiO2. Respiratory currently managing. Monitor Sats   - Continue Brovana, Pulmicort, Duoneb, Prednisone, roflumilast as prescribed. - Home BiPAP eval. Will need follow up PFT's as OP.     Sepsis (POA) 2/2 Community acquired pneumonia, Active.: SIRS 3/4 Positive present on admission with likely source of infection. Presenting vital signs were blood pressure 181/74, temperature 97, respiratory rate 27, 92 bpm.  WBCs 15.8. Initial lactic acid was 1.4. Portable chest x-ray demonstrates increased groundglass alveolar opacity in the right heart margin related to the medial segment of the right middle lobe. Opacities consistent with early acute middle lobe alveolar pulmonary infection. VRE negative. Sputum production that is yellow and tick. WBC trending down to 13.8 ()              - Continue  Zithromax 500 QD x3 doses, Ceftriaxone 1g Q24h              - Pending Pneumonia panel, MRSA, COVID and Flu A/B              - Daily CBC, BMP              - Blood cultures x2 pending show NG currently.    - Pending CT for f/u on right middle lobe.    Symptomatic primary HTN, Resolved: Noted to be hypertensive on admission with complaints of headache. Continued on lisinopril-hydrochlorothiazide and metoprolol. Current blood pressure staying stable in the 130s/60s              - Continue to monitor vitals     Noninsulin-dependent diabetes mellitus type 2, Chronic/Stable: Hyperglycemic upon presentation with most recent  POC glucose at 198. Likely confounded by concomitant corticosteroid use. - Continue to hold metformin              - Continue low dose SSI with hypoglycemia protocol     Heart failure with preserved ejection fraction,  EF 60-65%, Chronic: noted. Continue Lasix.        Hx of HLD, Stable: Noted. Continue Pravachol        Expected discharge date:  TBD    Disposition:    [x] Home       [] TCU       [] Rehab       [] Psych       [] SNF       [] [place]       [] Other-    Chief Complaint: Progressive SOB    Hospital Course: Alix Street is a 73 y.o. female with a PMHx of severe COPD, HTN, non-insulin-dependent diabetes mellitus type 2, heart failure with preserved ejection fraction, and HLD who presents to [facility] with chief complaint of shortness of breath on BiPAP. She was a transfer from [facility] inpatient unit for potential bronchoscopy due to mucous plugging. Patient was intubated and extubated on 7/13/21 after being treated for 1 week of COPD exacerbation. Patient complains of shortness of breath, wheezing currently. Was placed on BiPAP while here initially at 26/10 0.5 FiO2. Currently on 26/10 with 0.45 FiO2. Still has increased work of breathing. Repeat ABG's show compensation and continued reducing CO2. Subjective (past 24 hours): Patient still having complains of shortness of breath. States she isn't feeling much better. Currently complaining of headache, fatigue and continued shortness of breath.   She denies chest pain, abdominal pain, chills, fever, dizziness, lightheadedness. Denies any blood in sputum. Follow-up ABG showed improvement to the CO2 and was chronically compensated. Cleared for BiPAP use with daytime napping and continuous at bedtime. Home BiPAP evaluation. Patient to follow-up with pulmonology upon discharge for repeat PFTs. Pulmonology is following patient. They discussed Acapella and encouraged its use. Ordered COVID and Rapid flu. Continue to monitor patient with intent to try to wean off BiPAP based on tolerance. ROS (12 point review of systems completed. Pertinent positives noted. Otherwise ROS is negative). Medications:  Reviewed    Infusion Medications    sodium chloride      dextrose       Scheduled Medications    sodium chloride flush  5-40 mL Intravenous 2 times per day    enoxaparin  40 mg Subcutaneous Daily    azithromycin  500 mg Intravenous Q24H    cefTRIAXone (ROCEPHIN) IV  1,000 mg Intravenous Q24H    predniSONE  40 mg Oral Daily    pravastatin  10 mg Oral Daily    insulin lispro  0-6 Units Subcutaneous TID WC    insulin lispro  0-3 Units Subcutaneous Nightly    aspirin  81 mg Oral Daily    furosemide  20 mg Oral Daily    metoprolol tartrate  25 mg Oral BID    budesonide  500 mcg Nebulization BID    Roflumilast  500 mcg Oral Daily    lisinopril  20 mg Oral Daily    And    hydroCHLOROthiazide  25 mg Oral Daily    Arformoterol Tartrate  15 mcg Nebulization BID    ipratropium-albuterol  1 ampule Inhalation 4x daily     PRN Meds: sodium chloride flush, sodium chloride, ipratropium-albuterol, glucose, dextrose, glucagon (rDNA), dextrose      Intake/Output Summary (Last 24 hours) at 7/19/2021 1541  Last data filed at 7/19/2021 1533  Gross per 24 hour   Intake 1776.12 ml   Output 1300 ml   Net 476.12 ml       Diet:  ADULT DIET; Regular;  Low Sodium (2 gm); 2000 ml    Exam:  BP (!) 100/55   Pulse 110   Temp 97.8 °F (36.6 °C) (Axillary)   Resp 18   Ht 5' 7.5\" (1.715 m)   Wt 237 lb (107.5 kg)   SpO2 94%   BMI 36.57 kg/m²     General appearance: Sitting in bed on BiPAP. Acutely ill appearing. Appears stated age and cooperative. HEENT: Normocephalic, atraumatic. Pupils equal, round, and reactive to light. Conjunctivae/corneas clear. Neck: Supple, with full range of motion. No jugular venous distention. Trachea midline. Respiratory:  Increased resp. Effort. Decreased breathsounds noted on b/l lower lung fields. Expiratory wheezes heard throughout  Cardiovascular: Regular rate and rhythm with normal S1/S2 without murmurs, rubs or gallops. Abdomen: Soft, mild tenderness noted, non-distended with normal bowel sounds. Musculoskeletal: passive and active ROM x 4 extremities. Skin: Skin color, texture, turgor normal.  No rashes or lesions. Neurologic:  Neurovascularly intact without any focal sensory/motor deficits. Cranial nerves: II-XII intact, grossly non-focal.  Psychiatric: Alert and oriented, thought content appropriate, normal insight  Capillary Refill: Brisk,< 3 seconds   Peripheral Pulses: +2 palpable, equal bilaterally       Labs:   Recent Labs     07/18/21  0336 07/19/21  0543   WBC 15.8* 13.8*   HGB 12.2 12.3   HCT 41.2 41.5    244     Recent Labs     07/18/21  0336 07/19/21  0543    140   K 4.8 4.1   CL 96* 92*   CO2 40* 42*   BUN 27* 38*   CREATININE 0.5 0.8   CALCIUM 9.2 9.1   PHOS 4.0  --      Recent Labs     07/18/21  0336 07/19/21  0543   AST 35 35   * 118*   BILIDIR  --  <0.2   BILITOT 0.2* 0.3   ALKPHOS 77 85     No results for input(s): INR in the last 72 hours. No results for input(s): Pallavi Pace in the last 72 hours. Microbiology:      Urinalysis:    No results found for: Tim Morse, BACTERIA, RBCUA, BLOODU, SPECGRAV, GLUCOSEU    Radiology:  XR CHEST PORTABLE   Final Result   Impression:   There is increased groundglass alveolar opacity in the right heart margin    related to the medial segment of the right middle lobe. The lungs are    otherwise clear. The right middle lobe opacity is consistent with early acute middle lobe    alveolar pulmonary infection.       This document has been electronically signed by: Izabela Lewis MD on    07/18/2021 04:26 AM      CT CHEST W CONTRAST    (Results Pending)       DVT prophylaxis: [x] Lovenox                                 [] SCDs                                 [] SQ Heparin                                 [] Encourage ambulation           [] Already on Anticoagulation     Code Status: Full Code    PT/OT Eval Status: Ordered    Tele:   [x] yes             [] no    Electronically signed by Bev Holder DO on 7/19/2021 at 3:41 PM For information on Fall & Injury Prevention, visit [email or web address]

## 2021-07-20 LAB
ALBUMIN SERPL-MCNC: 3.1 G/DL (ref 3.5–5.1)
ALP BLD-CCNC: 76 U/L (ref 38–126)
ALT SERPL-CCNC: 94 U/L (ref 11–66)
ANION GAP SERPL CALCULATED.3IONS-SCNC: 8 MEQ/L (ref 8–16)
AST SERPL-CCNC: 20 U/L (ref 5–40)
BASE EXCESS (CALCULATED): 21.1 MMOL/L (ref -2.5–2.5)
BILIRUB SERPL-MCNC: 0.3 MG/DL (ref 0.3–1.2)
BILIRUBIN DIRECT: < 0.2 MG/DL (ref 0–0.3)
BUN BLDV-MCNC: 31 MG/DL (ref 7–22)
C-REACTIVE PROTEIN: 2.54 MG/DL (ref 0–1)
CALCIUM SERPL-MCNC: 9.4 MG/DL (ref 8.5–10.5)
CHLORIDE BLD-SCNC: 94 MEQ/L (ref 98–111)
CO2: 40 MEQ/L (ref 23–33)
COLLECTED BY:: ABNORMAL
CREAT SERPL-MCNC: 0.7 MG/DL (ref 0.4–1.2)
DEVICE: ABNORMAL
ERYTHROCYTE [DISTWIDTH] IN BLOOD BY AUTOMATED COUNT: 14.4 % (ref 11.5–14.5)
ERYTHROCYTE [DISTWIDTH] IN BLOOD BY AUTOMATED COUNT: 54.1 FL (ref 35–45)
FLU A ANTIGEN: NEGATIVE
FLU B ANTIGEN: NEGATIVE
GFR SERPL CREATININE-BSD FRML MDRD: 84 ML/MIN/1.73M2
GLUCOSE BLD-MCNC: 177 MG/DL (ref 70–108)
GLUCOSE BLD-MCNC: 209 MG/DL (ref 70–108)
GLUCOSE BLD-MCNC: 245 MG/DL (ref 70–108)
GLUCOSE BLD-MCNC: 273 MG/DL (ref 70–108)
GLUCOSE BLD-MCNC: 325 MG/DL (ref 70–108)
HCO3: 50 MMOL/L (ref 23–28)
HCT VFR BLD CALC: 39 % (ref 37–47)
HEMOGLOBIN: 11.6 GM/DL (ref 12–16)
IFIO2: 5
MCH RBC QN AUTO: 30.4 PG (ref 26–33)
MCHC RBC AUTO-ENTMCNC: 29.7 GM/DL (ref 32.2–35.5)
MCV RBC AUTO: 102.4 FL (ref 81–99)
MRSA SCREEN: NORMAL
O2 SATURATION: 92 %
ORGANISM: ABNORMAL
PCO2: 81 MMHG (ref 35–45)
PH BLOOD GAS: 7.4 (ref 7.35–7.45)
PLATELET # BLD: 241 THOU/MM3 (ref 130–400)
PMV BLD AUTO: 11.2 FL (ref 9.4–12.4)
PO2: 69 MMHG (ref 71–104)
POTASSIUM SERPL-SCNC: 4.1 MEQ/L (ref 3.5–5.2)
RBC # BLD: 3.81 MILL/MM3 (ref 4.2–5.4)
SEDIMENTATION RATE, ERYTHROCYTE: 68 MM/HR (ref 0–20)
SODIUM BLD-SCNC: 142 MEQ/L (ref 135–145)
SOURCE, BLOOD GAS: ABNORMAL
TOTAL PROTEIN: 5.6 G/DL (ref 6.1–8)
TSH SERPL DL<=0.05 MIU/L-ACNC: 0.42 UIU/ML (ref 0.4–4.2)
URINE CULTURE, ROUTINE: ABNORMAL
URINE CULTURE, ROUTINE: ABNORMAL
WBC # BLD: 15.5 THOU/MM3 (ref 4.8–10.8)

## 2021-07-20 PROCEDURE — 85027 COMPLETE CBC AUTOMATED: CPT

## 2021-07-20 PROCEDURE — 6370000000 HC RX 637 (ALT 250 FOR IP): Performed by: STUDENT IN AN ORGANIZED HEALTH CARE EDUCATION/TRAINING PROGRAM

## 2021-07-20 PROCEDURE — 2060000000 HC ICU INTERMEDIATE R&B

## 2021-07-20 PROCEDURE — APPSS30 APP SPLIT SHARED TIME 16-30 MINUTES: Performed by: NURSE PRACTITIONER

## 2021-07-20 PROCEDURE — 2580000003 HC RX 258: Performed by: STUDENT IN AN ORGANIZED HEALTH CARE EDUCATION/TRAINING PROGRAM

## 2021-07-20 PROCEDURE — 94660 CPAP INITIATION&MGMT: CPT

## 2021-07-20 PROCEDURE — 6360000002 HC RX W HCPCS: Performed by: INTERNAL MEDICINE

## 2021-07-20 PROCEDURE — 94669 MECHANICAL CHEST WALL OSCILL: CPT

## 2021-07-20 PROCEDURE — 85651 RBC SED RATE NONAUTOMATED: CPT

## 2021-07-20 PROCEDURE — 97535 SELF CARE MNGMENT TRAINING: CPT

## 2021-07-20 PROCEDURE — 84443 ASSAY THYROID STIM HORMONE: CPT

## 2021-07-20 PROCEDURE — 94761 N-INVAS EAR/PLS OXIMETRY MLT: CPT

## 2021-07-20 PROCEDURE — 97166 OT EVAL MOD COMPLEX 45 MIN: CPT

## 2021-07-20 PROCEDURE — 36415 COLL VENOUS BLD VENIPUNCTURE: CPT

## 2021-07-20 PROCEDURE — 99233 SBSQ HOSP IP/OBS HIGH 50: CPT | Performed by: INTERNAL MEDICINE

## 2021-07-20 PROCEDURE — 6360000002 HC RX W HCPCS: Performed by: STUDENT IN AN ORGANIZED HEALTH CARE EDUCATION/TRAINING PROGRAM

## 2021-07-20 PROCEDURE — 82948 REAGENT STRIP/BLOOD GLUCOSE: CPT

## 2021-07-20 PROCEDURE — 82803 BLOOD GASES ANY COMBINATION: CPT

## 2021-07-20 PROCEDURE — 2700000000 HC OXYGEN THERAPY PER DAY

## 2021-07-20 PROCEDURE — 86140 C-REACTIVE PROTEIN: CPT

## 2021-07-20 PROCEDURE — 94640 AIRWAY INHALATION TREATMENT: CPT

## 2021-07-20 PROCEDURE — 82248 BILIRUBIN DIRECT: CPT

## 2021-07-20 PROCEDURE — 80053 COMPREHEN METABOLIC PANEL: CPT

## 2021-07-20 PROCEDURE — 36600 WITHDRAWAL OF ARTERIAL BLOOD: CPT

## 2021-07-20 RX ORDER — ACETAMINOPHEN 325 MG/1
650 TABLET ORAL EVERY 4 HOURS PRN
Status: DISCONTINUED | OUTPATIENT
Start: 2021-07-20 | End: 2021-07-27 | Stop reason: HOSPADM

## 2021-07-20 RX ORDER — NAPROXEN 250 MG/1
250 TABLET ORAL 2 TIMES DAILY PRN
Status: DISCONTINUED | OUTPATIENT
Start: 2021-07-20 | End: 2021-07-20

## 2021-07-20 RX ADMIN — ENOXAPARIN SODIUM 40 MG: 40 INJECTION, SOLUTION INTRAVENOUS; SUBCUTANEOUS at 08:20

## 2021-07-20 RX ADMIN — HYDROCHLOROTHIAZIDE 25 MG: 25 TABLET ORAL at 08:21

## 2021-07-20 RX ADMIN — METOPROLOL TARTRATE 25 MG: 25 TABLET, FILM COATED ORAL at 08:21

## 2021-07-20 RX ADMIN — IPRATROPIUM BROMIDE AND ALBUTEROL SULFATE 1 AMPULE: .5; 3 SOLUTION RESPIRATORY (INHALATION) at 15:13

## 2021-07-20 RX ADMIN — INSULIN LISPRO 2 UNITS: 100 INJECTION, SOLUTION INTRAVENOUS; SUBCUTANEOUS at 08:20

## 2021-07-20 RX ADMIN — BUDESONIDE 500 MCG: 0.5 INHALANT RESPIRATORY (INHALATION) at 07:28

## 2021-07-20 RX ADMIN — LISINOPRIL 20 MG: 20 TABLET ORAL at 08:21

## 2021-07-20 RX ADMIN — METOPROLOL TARTRATE 25 MG: 25 TABLET, FILM COATED ORAL at 23:23

## 2021-07-20 RX ADMIN — IPRATROPIUM BROMIDE AND ALBUTEROL SULFATE 1 AMPULE: .5; 3 SOLUTION RESPIRATORY (INHALATION) at 07:30

## 2021-07-20 RX ADMIN — ROFLUMILAST 500 MCG: 500 TABLET ORAL at 12:03

## 2021-07-20 RX ADMIN — ARFORMOTEROL TARTRATE 15 MCG: 15 SOLUTION RESPIRATORY (INHALATION) at 17:30

## 2021-07-20 RX ADMIN — IPRATROPIUM BROMIDE AND ALBUTEROL SULFATE 1 AMPULE: .5; 3 SOLUTION RESPIRATORY (INHALATION) at 21:54

## 2021-07-20 RX ADMIN — ASPIRIN 81 MG: 81 TABLET, CHEWABLE ORAL at 08:21

## 2021-07-20 RX ADMIN — ARFORMOTEROL TARTRATE 15 MCG: 15 SOLUTION RESPIRATORY (INHALATION) at 07:32

## 2021-07-20 RX ADMIN — FUROSEMIDE 20 MG: 20 TABLET ORAL at 08:21

## 2021-07-20 RX ADMIN — PRAVASTATIN SODIUM 10 MG: 10 TABLET ORAL at 08:21

## 2021-07-20 RX ADMIN — PREDNISONE 40 MG: 20 TABLET ORAL at 08:20

## 2021-07-20 RX ADMIN — BUDESONIDE 500 MCG: 0.5 INHALANT RESPIRATORY (INHALATION) at 17:30

## 2021-07-20 RX ADMIN — INSULIN LISPRO 2 UNITS: 100 INJECTION, SOLUTION INTRAVENOUS; SUBCUTANEOUS at 12:09

## 2021-07-20 RX ADMIN — INSULIN LISPRO 4 UNITS: 100 INJECTION, SOLUTION INTRAVENOUS; SUBCUTANEOUS at 17:24

## 2021-07-20 RX ADMIN — SODIUM CHLORIDE 25 ML: 9 INJECTION, SOLUTION INTRAVENOUS at 05:30

## 2021-07-20 RX ADMIN — IPRATROPIUM BROMIDE AND ALBUTEROL SULFATE 1 AMPULE: .5; 3 SOLUTION RESPIRATORY (INHALATION) at 11:20

## 2021-07-20 RX ADMIN — CEFTRIAXONE SODIUM 1000 MG: 1 INJECTION, POWDER, FOR SOLUTION INTRAMUSCULAR; INTRAVENOUS at 05:30

## 2021-07-20 RX ADMIN — AZITHROMYCIN DIHYDRATE 500 MG: 500 INJECTION, POWDER, LYOPHILIZED, FOR SOLUTION INTRAVENOUS at 06:48

## 2021-07-20 ASSESSMENT — PAIN DESCRIPTION - ORIENTATION
ORIENTATION: MID

## 2021-07-20 ASSESSMENT — PAIN DESCRIPTION - ONSET
ONSET: ON-GOING

## 2021-07-20 ASSESSMENT — PAIN DESCRIPTION - PAIN TYPE
TYPE: CHRONIC PAIN

## 2021-07-20 ASSESSMENT — PAIN DESCRIPTION - DESCRIPTORS
DESCRIPTORS: CONSTANT
DESCRIPTORS: HEADACHE
DESCRIPTORS: HEADACHE

## 2021-07-20 ASSESSMENT — PAIN DESCRIPTION - LOCATION
LOCATION: HEAD
LOCATION: BACK

## 2021-07-20 ASSESSMENT — PAIN DESCRIPTION - PROGRESSION
CLINICAL_PROGRESSION: NOT CHANGED

## 2021-07-20 ASSESSMENT — PAIN SCALES - GENERAL
PAINLEVEL_OUTOF10: 5
PAINLEVEL_OUTOF10: 6
PAINLEVEL_OUTOF10: 0
PAINLEVEL_OUTOF10: 4
PAINLEVEL_OUTOF10: 5
PAINLEVEL_OUTOF10: 5

## 2021-07-20 ASSESSMENT — PAIN DESCRIPTION - FREQUENCY
FREQUENCY: CONTINUOUS

## 2021-07-20 ASSESSMENT — PAIN - FUNCTIONAL ASSESSMENT
PAIN_FUNCTIONAL_ASSESSMENT: ACTIVITIES ARE NOT PREVENTED

## 2021-07-20 NOTE — PROGRESS NOTES
Jacquie Roman 61  PHYSICAL THERAPY MISSED TREATMENT NOTE  [facility] ICU STEPDOWN TELEMETRY 4K    Date: 2021  Patient Name: Eloise Opitz        MRN: [id number]   : 1955  (73 y.o.)  Gender: female                REASON FOR MISSED TREATMENT:  Hold treatment per nursing request.  Pt just returned to bed after working with OT, pt not feeling well. May need bronch tomorrow per RN.

## 2021-07-20 NOTE — PLAN OF CARE
Problem: OXYGENATION/RESPIRATORY FUNCTION  Goal: Patient will achieve/maintain normal respiratory rate/effort  Description: Respiratory rate and effort will be within normal limits for the patient  7/20/2021 0525 by Brittani Mckinnon RCP  Outcome: Ongoing    Patient lung sounds are considered normal for their current lung condition. No signs of distress noted.  Current treatment regimen appropriate

## 2021-07-20 NOTE — PROGRESS NOTES
[address] ICU STEPDOWN TELEMETRY 4K  EVALUATION    Time:   Time In: 4124  Time Out: 1058  Timed Code Treatment Minutes: 18 Minutes  Minutes: 26    Date: 2021  Patient Name: Elian Decker,   Gender: female      MRN: [id number]  : 1955  (73 y.o.)  Referring Practitioner: Mariola High DO  Diagnosis: Respiratory failure  Additional Pertinent Hx: Per H&P, Miugel Crow is a 73 y.o. female,  PMHx off COPD, CHF,  DM type 2, HTN, HLD, is admitted to the hospital yesterday  for  shortness of breath and possible bronchocopy. Patient transferred from [facility] inpatient unit for bronchoscopy for mucous plug. Patient was being treated for COPD exacerbation for a week at [facility]; intubated and extubated on 21. She admits sputum production described as yellow and thick. Additional symptoms are headache, fatigue, abdominal pain. Denies blood in sputum. Reports associated headache. Denies chest pain, abdominal pain, dysuria, chills, fever, dizziness, lightheadedness. \"    Restrictions/Precautions:  Restrictions/Precautions: General Precautions, Fall Risk  Position Activity Restriction  Other position/activity restrictions: -continous bipap @ 40%,    Subjective  Chart Reviewed: Yes, Orders, Progress Notes, History and Physical  Patient assessed for rehabilitation services?: Yes    Subjective: RN okayed OT session. Upon arrival patient was sitting up in bed. Pt on continuous bipap @ 40%. Pt was agreeable to OT session. Pain:  Pain Assessment  Patient Currently in Pain: Yes  Pain Assessment: 0-10  Pain Level: 5  Pain Type: Chronic pain  Pain Location: Head    Vitals: Oxygen: O2 stats monitored throughout session. Patient on continuous bipap @ 40% and O2 stats found 97% upon arrival to room. Upon activity patient maintaining O2 at 94%. Pt required rest breaks throughout session d/t SOB.      Social/Functional History:  Lives With: Spouse, Son  Type of Home: House  Home Layout: One level  Home Access: Stairs to enter without rails  Entrance Stairs - Number of Steps: 1 ELLIE (~1 inch)   Bathroom Shower/Tub: Walk-in shower, Shower chair with back  Bathroom Toilet: Standard  Bathroom Accessibility: Accessible     ADL Assistance: Independent  Homemaking Assistance: Needs assistance (Spouse and son complete)  Ambulation Assistance: Independent  Transfer Assistance: Independent    VISION:WFL    HEARING:  WFL    COGNITION: Decreased Problem Solving and Decreased Safety Awareness    RANGE OF MOTION:  Bilateral Upper Extremity:  Impaired - decreased L shoulder flexion (pt reports rotator cuff repair), R shoulder WFL, B distal AROM WFL. STRENGTH:  Bilateral Upper Extremity:  Impaired - deconditioned     ADL:   Lower Extremity Dressing: Minimal Assistance and with increased time for completion. to don/doff B socks. Toileting: Maximum Assistance. Assist with kimberly care. Toilet Transfer: [employer] and with increased time for completion. Luiz Confer BALANCE:  Sitting Balance:  Stand By Assistance. Standing Balance: Contact Guard Assistance. BED MOBILITY:  Supine to Sit: Contact Guard Assistance, with head of bed raised, with rail    Sit to Supine: Contact Guard Assistance    Scooting: Contact Guard Assistance      TRANSFERS:  Sit to Stand:  [employer], with increased time for completion, cues for hand placement. Stand to Sit: [employer], with increased time for completion, cues for hand placement. FUNCTIONAL MOBILITY:  Assistive Device: Rolling Walker  Assist Level:  Contact Guard Assistance. Distance: EOB to BSC. Slow pace, No LOB, increased SOB noted. Activity Tolerance:  Patient tolerance of  treatment: good. Assessment:  Assessment: This 73year old female is s/p respiratory failure. Pt currently on continous bi pap at 40%.   Pt requires skilled OT intervention to increase indep and endurance with all self care, transfers, mobility, and IADLs to return to Indep PLOF. Without skilled OT intervention pt is at increased risk for falls and caregiver burden. Performance deficits / Impairments: Decreased functional mobility , Decreased ADL status, Decreased endurance, Decreased safe awareness, Decreased high-level IADLs, Decreased balance  Prognosis: Fair  REQUIRES OT FOLLOW UP: Yes    Treatment Initiated: Treatment and education initiated within context of evaluation. Evaluation time included review of current medical information, gathering information related to past medical, social and functional history, completion of standardized testing, formal and informal observation of tasks, assessment of data and development of plan of care and goals. Treatment time included skilled education and facilitation of tasks to increase safety and independence with ADL's for improved functional independence and quality of life. Discharge Recommendations:  Continue to assess pending progress, Subacute/Skilled Nursing Facility    Patient Education:  OT Education: Plan of Care, OT Role, ADL Adaptive Strategies, Transfer Training    Equipment Recommendations:  Equipment Needed: No  Other: Monitor needs pending progress. Plan:  Times per week: 5x  Current Treatment Recommendations: ROM, Functional Mobility Training, Endurance Training, Balance Training, Safety Education & Training, Self-Care / ADL, Patient/Caregiver Education & Training, Home Management Training, Equipment Evaluation, Education, & procurement. See long-term goal time frame for expected duration of plan of care. If no long-term goals established, a short length of stay is anticipated. Goals:  Patient goals : Go Home  Short term goals  Time Frame for Short term goals: Until discharge  Short term goal 1: Pt will complete BUE AROM exercises with min vcs for technique to increase indep and endurance with all self cares.   Short term goal 2: Pt will complete standing tolerance x 2 minutes with S while O2 stats remain above 90% to increase indep with toileting. Short term goal 3: Pt will complete BADL routine with S while demonstrating EC techniques to increase endurance within home environment. Short term goal 4: Pt will complete short distances with S while O2 stats remain above 90% to increase indep and endurance within home environment. Following session, patient left in safe position with all fall risk precautions in place.

## 2021-07-20 NOTE — PROGRESS NOTES
Sent a message to Dr. Joseline Campbell about a home BiPAP eval that was ordered. At this time this patient is unable to be off of the BiPAP for more than a few minutes due to becoming short of breath and has to go back on. Part of the eval is drawing an ABG while off the BiPAP but we are unable to do this part so I messaged Dr. Joseline Campbell about waiting until this patient can tolerate being off the BiPAP and he agreed.

## 2021-07-20 NOTE — CARE COORDINATION
DISCHARGE/PLANNING EVALUATION  7/20/21, 11:42 AM EDT    Reason for Referral: Discharge planning  Mental Status: Alert and Oriented  Decision Making: Self  Family/Social/Home Environment: Patient lives with spouse and son. Patient reported that she also has a daughter near by that is supportive. She reported that she lives in a one story home. Current Services including food security, transportation and housekeeping: Patient reported that she was independent prior to admission. She reported that she did not have any current services in the home. Current Equipment: O2, walker  Payment Source: BCBS  Concerns or Barriers to Discharge:  None  Post acute provider list with quality measures, geographic area and applicable managed care information provided. Questions regarding selection process answered: Offered    Teach Back Method used with patient and daughter regarding care plan and needs. Patient and daughter verbalize understanding of the plan of care and contribute to goal setting. Patient goals, treatment preferences and discharge plan: Patient plans to discharge to [address] [place] [address] at discharge. ROSCOE called and made referral to [employer] from [facility]. Krystal's phone number is [phone] and her faxes are [phone] or [phone]. ROSCOE faxed information to [employer].     Electronically signed by JEFFERSON Isidro on 7/20/2021 at 11:42 AM

## 2021-07-20 NOTE — PLAN OF CARE
Problem: Discharge Planning:  Goal: Discharged to appropriate level of care  Description: Discharged to appropriate level of care  7/20/2021 1157 by JEFFERSON San  Outcome: Completed      SW consult received and completed. See SW note.

## 2021-07-20 NOTE — PROGRESS NOTES
[facility]      Patient - Anson Ahumada   MRN -  [id number]   [facility]   - 1955      Date of Admission -  2021 12:12 AM  Date of evaluation -  2021  Room - -13013-A   Hospital Day - [address] [place] [address] Primary Care Physician - Elnita Osler, PA     Problem List      Active Hospital Problems    Diagnosis Date Noted    Stage 4 very severe COPD by GOLD classification [facility]) [J44.9]     Smoker [F17.200]     Respiratory failure [facility]) [J96.90] 2021     Reason for Consult    Shortness of breath  HPI   History Obtained From: Patient and electronic medical record. Anson Ahumada is a 73 y.o. female,  PMHx off COPD, CHF,  DM type 2, HTN, HLD, is admitted to the hospital yesterday  for  shortness of breath and possible bronchocopy. Patient transferred from [facility] inpatient unit for bronchoscopy for mucous plug. Patient was being treated for COPD exacerbation for a week at [facility]; intubated and extubated on 21. She admits sputum production described as yellow and thick. Additional symptoms are headache, fatigue, abdominal pain. Denies blood in sputum. Reports associated headache. Denies chest pain, abdominal pain, dysuria, chills, fever, dizziness, lightheadedness     She is having shortness of breath: Yes  Onset: worsening past month  Duration: on 4L nasal canula home oxygen for yrs but 1months SOB is worsening. Functional status prior to beginning of symptoms: with NC Oxygen half block/s on level ground. Current functional capacity on level ground: 0 block/s on level ground. She can climb steps: No  She admits to orthopnea. Past 24 Hours   -Continues on BiPAP most of the time  -Currently on O2 5LPM  -Still c/o SOB   -CTA  RML collapse (-PE)- bronch fatoumata @ 1530  -ABG compensated this AM on 5LPM    -All systems reviewed.      PMHx   Past Medical History      Diagnosis Date    CHF (congestive heart failure) (HCC)     COPD (chronic obstructive pulmonary disease) (HCC)     Diabetes mellitus ([facility] [address]     Diastolic dysfunction     Environmental and seasonal allergies     HLD (hyperlipidemia)     Hypertension       Past Surgical History        Procedure Laterality Date    APPENDECTOMY       SECTION      FOOT SURGERY      HERNIA REPAIR       Meds    Current Medications    sodium chloride flush  5-40 mL Intravenous 2 times per day    [Held by provider] enoxaparin  40 mg Subcutaneous Daily    cefTRIAXone (ROCEPHIN) IV  1,000 mg Intravenous Q24H    predniSONE  40 mg Oral Daily    pravastatin  10 mg Oral Daily    insulin lispro  0-6 Units Subcutaneous TID WC    insulin lispro  0-3 Units Subcutaneous Nightly    aspirin  81 mg Oral Daily    furosemide  20 mg Oral Daily    metoprolol tartrate  25 mg Oral BID    budesonide  500 mcg Nebulization BID    Roflumilast  500 mcg Oral Daily    lisinopril  20 mg Oral Daily    And    hydroCHLOROthiazide  25 mg Oral Daily    Arformoterol Tartrate  15 mcg Nebulization BID    ipratropium-albuterol  1 ampule Inhalation 4x daily     acetaminophen, sodium chloride flush, sodium chloride, ipratropium-albuterol, glucose, dextrose, glucagon (rDNA), dextrose  IV Drips/Infusions   sodium chloride 25 mL (21 0530)    dextrose       Home Medications  Medications Prior to Admission: ipratropium-albuterol (DUONEB) 0.5-2.5 (3) MG/3ML SOLN nebulizer solution, Inhale 3 mLs into the lungs every 4 hours as needed for Shortness of Breath  budesonide (PULMICORT) 0.5 MG/2ML nebulizer suspension, Take 2 mLs by nebulization 2 times daily  albuterol-ipratropium (COMBIVENT RESPIMAT)  MCG/ACT AERS inhaler, Inhale 1 puff into the lungs every 6 hours as needed for Wheezing or Shortness of Breath  albuterol sulfate  (90 Base) MCG/ACT inhaler, Inhale 2 puffs into the lungs every 4 hours as needed for Wheezing or Shortness of Breath  levocetirizine (XYZAL) 5 MG tablet, Take 1 tablet by mouth nightly  fluticasone (FLONASE) 50 MCG/ACT nasal spray, 2 sprays by Nasal route daily  Roflumilast (DALIRESP) 500 MCG tablet, Take 1 tablet by mouth daily  aspirin 81 MG tablet, Take 81 mg by mouth daily  metoprolol tartrate (LOPRESSOR) 25 MG tablet, Take 25 mg by mouth 2 times daily  lisinopril-hydrochlorothiazide (PRINZIDE;ZESTORETIC) 20-25 MG per tablet, Take 1 tablet by mouth daily  metFORMIN (GLUCOPHAGE) 500 MG tablet, Take 500 mg by mouth 2 times daily (with meals) 1,000 mg at supper. Cholecalciferol (VITAMIN D) 2000 units CAPS capsule, Take by mouth  Cyanocobalamin (B-12 PO), Take by mouth  pravastatin (PRAVACHOL) 10 MG tablet, Take 10 mg by mouth daily  Pseudoephedrine-Guaifenesin (MUCINEX D PO), Take by mouth as needed   Ibuprofen (ADVIL PO), Take by mouth as needed  FUROSEMIDE PO, Take 20 mg by mouth   Sodium Chloride-Sodium Bicarb (AYR SALINE NASAL RINSE) 1.57 g PACK, 1 packet by Nasal route 2 times daily as needed (sinus congestion) (Patient not taking: Reported on 5/26/2021)  Multiple Vitamins-Minerals (THERAPEUTIC MULTIVITAMIN-MINERALS) tablet, Take 1 tablet by mouth daily  Diet    ADULT DIET; Regular;  Low Sodium (2 gm); 2000 ml  Diet NPO  Allergies    Excedrin extra strength [aspirin-acetaminophen-caffeine] and Norco [hydrocodone-acetaminophen]  Social History     Social History     Socioeconomic History    Marital status:      Spouse name: Not on file    Number of children: Not on file    Years of education: Not on file    Highest education level: Not on file   Occupational History    Not on file   Tobacco Use    Smoking status: Current Some Day Smoker     Packs/day: 1.00     Years: 42.00     Pack years: 42.00     Start date: 3/22/1978    Smokeless tobacco: Never Used    Tobacco comment: 1 pack every 2-3 weeks   Substance and Sexual Activity    Alcohol use: No    Drug use: No    Sexual activity: Not on file   Other height is 5' 7.5\" (1.715 m) and weight is 228 lb 1.6 oz (103.5 kg). Her oral temperature is 98.5 °F (36.9 °C). Her blood pressure is 108/58 (abnormal) and her pulse is 105. Her respiration is 23 and oxygen saturation is 90%. Body mass index is 35.2 kg/m². SUPPLEMENTAL O2: O2 Flow Rate (L/min): 5 L/min     I/O        Intake/Output Summary (Last 24 hours) at 7/20/2021 1130  Last data filed at 7/20/2021 0525  Gross per 24 hour   Intake 561.02 ml   Output 1275 ml   Net -713.98 ml     I/O last 3 completed shifts: In: 1353.8 [P.O.:718; I.V.:635.8]  Out: 1575 [DUJTM:0143]   Patient Vitals for the past 96 hrs (Last 3 readings):   Weight   07/20/21 0515 228 lb 1.6 oz (103.5 kg)   07/18/21 0029 237 lb (107.5 kg)       Exam   Constitutional: Patient appears in no distress on BiPAP with full face mask. Mouth/Throat: Exam is limited by full facemask with the BiPAP  Neck: Neck supple. Cardiovascular: S1 and S2 heard. No murmurs. Pulmonary/Chest: Bilateral air entry present. Good breath sounds on both sides, diminished at bases right more than left I. No wheezes. No rales. Abdominal: Soft. Obese. no tenderness. Extremities: Patient exhibits trace leg edema. Neurological: Patient is awake and alert.      Labs  - Old records and notes have been reviewed in [facility]   ABG  Lab Results   Component Value Date    PH 7.40 07/20/2021    PO2 69 07/20/2021    PCO2 81 07/20/2021    HCO3 50 07/20/2021    O2SAT 92 07/20/2021     Lab Results   Component Value Date    IFIO2 5 07/20/2021    MODE BiLevel 07/18/2021    SETPEEP 10.0 07/19/2021     CBC  Recent Labs     07/18/21  0336 07/19/21  0543 07/20/21  0534   WBC 15.8* 13.8* 15.5*   RBC 3.91* 3.93* 3.81*   HGB 12.2 12.3 11.6*   HCT 41.2 41.5 39.0   .4* 105.6* 102.4*   MCH 31.2 31.3 30.4   MCHC 29.6* 29.6* 29.7*    244 241   MPV 11.4 11.6 11.2      BMP  Recent Labs     07/18/21  0336 07/19/21  0543 07/20/21  0534    140 142   K 4.8 4.1 4.1   CL 96* 92* 94* CO2 40* 42* 40*   BUN 27* 38* 31*   CREATININE 0.5 0.8 0.7   GLUCOSE 192* 165* 177*   MG 2.1  --   --    PHOS 4.0  --   --    CALCIUM 9.2 9.1 9.4     LFT  Recent Labs     07/18/21  0336 07/19/21  0543 07/20/21  0534   AST 35 35 20   * 118* 94*   BILITOT 0.2* 0.3 0.3   ALKPHOS 77 85 76     TROP  No results found for: TROPONINT  BNP  No results for input(s): BNP in the last 72 hours. Lactic Acid  Recent Labs     07/18/21  0508   LACTA 1.4     INR  No results for input(s): INR, PROTIME in the last 72 hours. PTT  No results for input(s): APTT in the last 72 hours. Glucose  Recent Labs     07/19/21  1638 07/19/21 2009 07/20/21  0731   POCGLU 293* 209* 209*     UA No results for input(s): SPECGRAV, PHUR, COLORU, CLARITYU, MUCUS, PROTEINU, BLOODU, RBCUA, WBCUA, BACTERIA, NITRU, GLUCOSEU, BILIRUBINUR, UROBILINOGEN, KETUA, LABCAST, LABCASTTY, AMORPHOS in the last 72 hours. Invalid input(s): CRYSTALS. PFTs     11/4/2020        Sleep studies   Pt stated that she had sleep study a yr ago and never diagnosed with sleep apnea    Cultures    Urine culture: gram (+) cocci chains  VRE (-)  Blood culture: no growth; prelim  COVID-19, Rapid [[id number]] Collected: 07/19/21 1520   Order Status: Completed Specimen: Nasopharyngeal Swab Updated: 07/19/21 1540    SARS-CoV-2, NAAT NOT DETECTED    Comment: Rapid NAAT:   Negative results should be treated as presumptive and,   if inconsistent with clinical signs and symptoms or necessary for   patient management, should be tested with an alternative molecular   assay. Negative results do not preclude SARS-CoV-2 infection and   should not be used as the sole basis for patient management decisions. This test has been authorized by the FDA under an Emergency Use   Authorization (EUA) for use by authorized laboratories.      Fact sheet for Healthcare Providers:   Mario.scar   Fact sheet for Patients: Mario.scar METHODOLOGY: Isothermal Nucleic Acid Amplification   Performed at [facility] [address]           EKG   Normal sinus rhythm  Right bundle branch block  Abnormal ECG  No previous ECGs available  Echocardiogram   2/19/20      Radiology    CXR 7/18/2021  Findings:   No pleural effusion. Heart size normal.   No acute fracture. Impression   Impression:   There is increased groundglass alveolar opacity in the right heart margin    related to the medial segment of the right middle lobe. The lungs are    otherwise clear. The right middle lobe opacity is consistent with early acute middle lobe    alveolar pulmonary infection. 7/19/2021   CTA THORAX / PULMONARY EMBOLUS STUDY:   1. No pulmonary emboli are seen. Questionable pulmonary arterial hypertension. 2. Subtotal collapse of the right middle lobe which could be due to mucous plug but cannot exclude other endobronchial pathology. No distinct mass lesion is seen, however. CT Scans 10/22/2019  CT Lung screening. Negative for acute changes and pulmonary nodules. Assessment:Plan   Acute on chronic hypoxic and hypercapnic respiratory failure secondary to COPD exacerbation.  -Positive for - SOB worsening, on BiPAP;ABG- PCO2 91, pH-7.34, HCO3 49  - keep BiPAP , monitoring O2sat,  -Continue antibiotics, steroids, bronchodilators.   -No current indications for bronchoscopy (Pt is educated about this)  -Pt is informed about smoking cessation. Community acquired pneumonia  -Positive for SOB, malaisia, leukocytosis, CXR infiltrate on right middle lobe  - MRSA and  VRE PCRs negative  -Blood cultures x2, pending    COPD exacerbation.  GOLD 4 stage  -after discharge PFTs  -Home BiPAP eval    DM type 2     HTN      -CTA Chest- reviewed no PE- but RML collapse  -Bronchoscopy tomorrow 7/21/21 at 1530 in ENDO suite  -NPO after midnight  -HOLD AM dose of Lovenox   -Ruma Li her family were educated about my impression and plan. Monico Campbell her family were informed about the risks and complications associated with bronchoscopy with biopsy including but not limited to pneumothorax with requirement of chest tube placement. Monico Campbell her family  verbalizes understanding.     -BiPAP 26/10 cm H2O- ABG reviewed from today compensated and CO2 improving- can be used with daytime napping and continuous at bedtime   -Home BiPAP evaluation using Severe COPD- ABG reviewed this AM compensated pCO2 > 52-   -PFT reviewed   -ECHO reviewed. -Continue Brovana, Pulmicort, and Duonebs QID  -VTE prophylaxis: Lovenox/SCDs  -ATB per primary  -Acapella/IS discussed and encouraged use   -Pneumonia panel pending   -Sleep study reviewed from 2018    Plan of car discussed with patient and primary RN  Meds and orders reviewed  Questions and concerns addressed. Electronically signed by   SEVERIANO Burt CNP on 7/20/2021 at 11:30 AM     Addendum by Dr. Kasi Hill MD:  I have seen and examined the patient independently. Face to face evaluation and examination was performed. The above evaluation and note has been reviewed. Labs and radiographs were reviewed. I Have discussed with Ms. IVAN Burt CNP about this patient in detail. The above assessment and plan has been reviewed. Please see my modifications mentioned below. My modifications:  Patient remained on BiPAP.   We will change the patient BiPAP settings to 24 x 8 cm of water  ABG performed today on 5 L of oxygen on nasal cannula:    Lab Results   Component Value Date    PH 7.40 07/20/2021    PCO2 81 07/20/2021    PO2 69 07/20/2021    HCO3 50 07/20/2021    O2SAT 92 07/20/2021     Lab Results   Component Value Date    IFIO2 5 07/20/2021    MODE BiLevel 07/18/2021    SETPEEP 10.0 07/19/2021       COVID-19 test performed on 19 July 2021: Negative  Influenza for a and B were negative on 19 July 2021  Blood culture- no growth on 18 July 2021  Urine culture: Enterococcus faecalis    Plan:  -Keep n.p.o. patient from midnight  -Schedule patient for Bronchoscopy with  biopsy under fluoroscopy in endoscopy suite at [address] [place] [address] with anesthesia from anaesthesilogy department to further evaluate the etiology of abnormal CT scan of chest with right middle lobe collapse to rule out endobronchial lesions VS mucous plugs.  -Brenda Govern educated and informed about bronchoscopy procedure,method, complicantions of procedure including but not limited to development of pneumothorax with requirement of chest tube placement. She agreed for the procedure and verbalizes understanding.  -She was also informed about the increased risk of kimberly and postoperative pulmonary complications from above planned bronchoscopy under general anesthesia given her history of very severe COPD and chronic respiratory failure. Patient was also informed about the possibility of the requirement of long-term ventilator requirement with the possibility of tracheostomy for weaning. Patient agreed to go for the above procedure. She verbalized understanding.       Luis Alberto Michaud MD 7/20/2021 6:31 PM

## 2021-07-20 NOTE — PROGRESS NOTES
Hospitalist Progress Note    Patient:  Alberto Backer      Unit/Bed:4K-13/013-A    YOB: 1955    MRN: [id number]       Acct: [de-identified]     PCP: SUSAN Rodgers    Date of Admission: 2021       Assessment/Plan:    Acute on chronic respiratory failure [facility] hypoxia/hypercapnia 2/2 COPD exacerbation, GOLD 4 with FEV1 20%, Active.: On BiPaP /10, 0.45 FiO2 -  CXR showed pulmonary congestion and right middle lobe infiltrate with mild flattening of diaphgragm. Pulmonary consulted. CTA shows no pulmonary emboli seen. There is questionable pulmonary arterial hypertension. There is subtotal collapse of the right middle lobe which could be due to mucous plug. Cannot exclude other endobronchial pathology. No distinct lesion seen. Patient was examined by pulmonology and is scheduled for a bronchoscopy on 2021 at 1530 in the Endo suite. Patient to be n.p.o. after midnight.         -  AB.29, CO2 100, PO2 77, HCO3 48 BE 17 Sat 92%    -  Repeat ABG -7.34, CO2 91, PO2 83, HCO3 49, base excess 18.3, sat 94%       -  AB.41, PCO2 83, PO2 81, HCO3 52, BE 22.3, Sat 95%, FiO2 0.45    -  AB.40, pCO2 8, pO2 69, HCO3 50, BE 21.1. Sat 92% FiO2 .5                - Pulmonology Following. Bronch scheduled for 21 at 1530   - NPO after midnight              - Continue Bipap at 24/8, 0.5 FiO2. Respiratory currently managing. Monitor Sats   - Continue Brovana, Pulmicort, Duoneb, Prednisone, roflumilast as prescribed. - Home BiPAP eval. Will need follow up PFT's as OP.     Sepsis (POA) 2/2 Community acquired pneumonia, Active.: SIRS 3/4 Positive present on admission with likely source of infection. Presenting vital signs were blood pressure 181/74, temperature 97, respiratory rate 27, 92 bpm.  WBCs 15.8. Initial lactic acid was 1.4.   Portable chest x-ray demonstrates increased groundglass alveolar opacity in the right heart margin related to the medial segment of the right middle lobe. Opacities consistent with early acute middle lobe alveolar pulmonary infection. VRE negative. Sputum production that is yellow and tick. WBC trending down to 13.8 (7/19). CRP elevated at 2.54. Sed rate elevated at 68. Potential for inflammatory etiology. CTA showed no pulmonary emboli. Subtotal collapse of the right middle lobe. To receive bronchoscopy tomorrow via pulmonology at 1530.               - Continue  Zithromax 500 QD x3 doses, Ceftriaxone 1g Q24h              - Pending Pneumonia panel, MRSA, COVID and Flu A/B              - Daily CBC, BMP              - Blood cultures x2 pending show NG currently. Symptomatic primary HTN, Resolved: Noted to be hypertensive on admission with complaints of headache. Continued on lisinopril-hydrochlorothiazide and metoprolol. Hold parameters in place.              - Continue to monitor vitals     Noninsulin-dependent diabetes mellitus type 2, Chronic/Stable: Hyperglycemic upon presentation with most recent  POC glucose at 198. Likely confounded by concomitant corticosteroid use. - Continue to hold metformin              - Continue low dose SSI with hypoglycemia protocol     Heart failure with preserved ejection fraction,  EF 60-65%, Chronic: noted. Continue Lasix. Urinary Tract Infection, Active: Incidental finding on urine culture. Patient has no signs or symptoms of urinary tract infection including no dysuria. Patient does have Magana in. Will order Magana removal and order external catheter to help mitigate any colonization.        Hx of HLD, Stable: Noted.  Continue Pravachol        Expected discharge date:  TBD    Disposition:    [x] Home       [] TCU       [] Rehab       [] Psych       [] SNF       [] [place]       [] Other-    Chief Complaint: Progressive SOB    Hospital Course: Christopher Pineda is a 73 y.o. female with a PMHx of severe COPD, HTN, non-insulin-dependent diabetes mellitus type 2, heart failure with preserved ejection fraction, and HLD who presents to [facility] with chief complaint of shortness of breath on BiPAP. She was a transfer from [facility] inpatient unit for potential bronchoscopy due to mucous plugging. Patient was intubated and extubated on 7/13/21 after being treated for 1 week of COPD exacerbation. Patient complains of shortness of breath, wheezing currently. Was placed on BiPAP while here initially at 26/10 0.5 FiO2. Currently on 24/8 with 0.5 FiO2. Still has increased work of breathing. Repeat ABG's show compensation and continued reducing CO2. Cleared for BiPAP use with daytime napping and continuous at bedtime. Home BiPAP evaluation. Patient to follow-up with pulmonology upon discharge for repeat PFTs. Pulmonology is following patient. They discussed Acapella and encouraged its use. Continue to monitor patient with intent to try to wean off BiPAP based on tolerance. MRSA screening was negative COVID-19 not detected rapid flu a and B- blood culture showed no growth x2, VRE was negative. Subjective (past 24 hours): Patient states SOB is persisting. States she is not feeling improved really. Current BiPAP settings are 24/8, 0.5 FiO2. Currently complaining of headache, fatigue and continued shortness of breath. Patient does remove BiPAP to eat and uses nasal cannula at the time. She denies chest pain, abdominal pain, chills, fever, dizziness, lightheadedness. Denies any blood in sputum. Urine culture identified Enterococcus faecalis at 10,000-20,000 CFU's. Discontinue Magana catheter. Insert external catheter. Continue to monitor present symptoms. Persistent shortness of breath patient was evaluated by pulmonology today and is determined to have bronchoscopy scheduled for 7/21/2021 at 1530 in the Endo suite. Patient n.p.o. after midnight. Continue to monitor BiPAP. ROS (12 point review of systems completed. Pertinent positives noted. increased groundglass alveolar opacity in the right heart margin    related to the medial segment of the right middle lobe. The lungs are    otherwise clear. The right middle lobe opacity is consistent with early acute middle lobe    alveolar pulmonary infection.       This document has been electronically signed by: Barrington Burdick MD on    07/18/2021 04:26 AM          DVT prophylaxis: [x] Lovenox                                 [] SCDs                                 [] SQ Heparin                                 [] Encourage ambulation           [] Already on Anticoagulation     Code Status: Full Code    PT/OT Eval Status: Ordered    Tele:   [x] yes             [] no    Electronically signed by Michelle Mcclendon DO on 7/20/2021 at 2:39 PM

## 2021-07-20 NOTE — CARE COORDINATION
7/20/21, 12:59 PM EDT    DISCHARGE ON GOING [address] day: 2  [facility] Reason for admit: Respiratory failure [facility]) [J96.90]   Procedure:   7/18 CXR  There is increased groundglass alveolar opacity in the right heart margin   related to the medial segment of the right middle lobe.  The lungs are   otherwise clear. The right middle lobe opacity is consistent with early acute middle lobe   alveolar pulmonary infection. 7/19 CT Chest  Subtotal collapse of the right middle lobe which could be due to mucous plug  7/21 Bronchoscopy planned  Barriers to Discharge: From [facility]. Respiratory Failure w history COPD, CHF. CO2 81, WBC 15.5; (+) Urine Culture; Enterococcus Faecalis; monitor.  Pulmonary plans procedure above in am. 40% BIPAP versus 4-5L oxygen, IV AB continued  PCP: SUSAN Rodgers  Readmission Risk Score: 10%     Patient Goals/Plan/Treatment Preferences: lives w spouse and son; prefers new [facility] and [facility] ([facility] in [place] for rehab needs w new BIPAP 26/10 has Evie ([facility] [place]) home oxygen 4L, has nebulizer, walker; therapy following; collaborated w ROSCOE Renee

## 2021-07-20 NOTE — DISCHARGE INSTR - COC
Continuity of Care Form    Patient Name: Christopher Pineda   :  1955  MRN:  [id number]    Admit date:  2021  Discharge date:  2021    Code Status Order: Full Code   Advance Directives:      Admitting Physician:  Sarah Rachel DO  PCP: SUSAN Gibbs    Discharging Nurse:  2022 Unit/Room#: 4K-13/013-A  Discharging Unit Phone Number: [phone]    Emergency Contact:   Extended Emergency Contact Information  Primary Emergency Contact: Wendy Soria [address] Phone: [phone]  Relation: Spouse    Past Surgical History:  Past Surgical History:   Procedure Laterality Date    APPENDECTOMY       SECTION      FOOT SURGERY      HERNIA REPAIR         Immunization History:   Immunization History   Administered Date(s) Administered    Influenza Virus Vaccine 02/15/2018    Pneumococcal Conjugate 13-valent (Joggtwa10) 2018    Pneumococcal Conjugate Vaccine 02/15/2018    Pneumococcal Polysaccharide ([id number]) 2015       Active Problems:  Patient Active Problem List   Diagnosis Code    Respiratory failure ([facility] J96.90    Stage 4 very severe COPD by GOLD classification ([facility] J44.9    Smoker F17.200       Isolation/Infection:   Isolation            No Isolation          Patient Infection Status       Infection Onset Added Last Indicated Last Indicated By Review Planned Expiration Resolved Resolved By    None active    Resolved    COVID-19 Rule Out 21 COVID-19, Rapid (Ordered)   21 Rule-Out Test Resulted            Nurse Assessment:  Last Vital Signs: BP (!) 108/58   Pulse 105   Temp 98.5 °F (36.9 °C) (Oral)   Resp 28   Ht 5' 7.5\" (1.715 m)   Wt 228 lb 1.6 oz (103.5 kg)   SpO2 94%   BMI 35.20 kg/m²     Last documented pain score (0-10 scale): Pain Level: 5  Last Weight:   Wt Readings from Last 1 Encounters:   21 228 lb 1.6 oz (103.5 kg)     Mental Status:  oriented and alert    IV Access:  - None    Nursing Mobility/ADLs:  Walking   Independent  Transfer  Independent  Bathing  Independent  Dressing  Independent  Toileting  Assisted  Feeding  Independent  Med [facility]  Assisted  Med Delivery   whole    Wound Care Documentation and Therapy:        Elimination:  Continence:   · Bowel: Yes  · Bladder: Yes  Urinary Catheter: None   Colostomy/Ileostomy/Ileal Conduit: No       Date of Last BM: 7/26/2021    Intake/Output Summary (Last 24 hours) at 7/20/2021 1149  Last data filed at 7/20/2021 0525  Gross per 24 hour   Intake 561.02 ml   Output 1275 ml   Net -713.98 ml     I/O last 3 completed shifts: In: 1353.8 [P.O.:718; I.V.:635.8]  Out: 1575 [PRINN:[id number]]    Safety Concerns: At Risk for Falls    Impairments/Disabilities:      None    Nutrition Therapy:  Current Nutrition Therapy:   - Oral Diet:  Carb Control 4 carbs/meal (1800kcals/day)    Routes of Feeding: Oral  Liquids: Thin Liquids  Daily Fluid Restriction: no  Last Modified Barium Swallow with Video (Video Swallowing Test): not done    Treatments at the Time of Hospital Discharge:   Respiratory Treatments: see orders  Oxygen Therapy:  is on oxygen at 2 L/min per nasal cannula. 2L to 4L/NC.   Ventilator:    - BiPAP   IPAP: 24 cmH20, CPAP/EPAP: 8 cmH2O only when sleeping    Rehab Therapies: Physical Therapy and Occupational Therapy  Weight Bearing Status/Restrictions: No weight bearing restirctions  Other Medical Equipment (for information only, NOT a DME order):  walker  Other Treatments:     Patient's personal belongings (please select all that are sent with patient):  Nii    RN SIGNATURE:  Electronically signed by Arian Gagnon RN on 7/27/21 at 5:17 PM EDT    CASE MANAGEMENT/SOCIAL WORK SECTION    Inpatient Status Date: 7/18/2021    Readmission Risk Assessment Score:  Readmission Risk              Risk of Unplanned Readmission:  11           Discharging to Facility/ Agency   · [facility]: [facility]  · Address:

## 2021-07-20 NOTE — PLAN OF CARE
Problem: Impaired respiratory status  Goal: Clear lung sounds    Bipap at night and prn t/o day--- pt does request it to be on- she said she breathes better   Bipap 24/8   Duoneb 4 x day via metaneb q8  Brovana bid  Pulmicort bid  Acapella q8- pt unable to do properly due to being short of breath  NC 4-5 lpm

## 2021-07-20 NOTE — PLAN OF CARE
Problem: Urinary Elimination:  Goal: Signs and symptoms of infection will decrease  Description: Signs and symptoms of infection will decrease  Outcome: Ongoing  Note: Patient has kenny catheter for fluid management. Kenny is patent and draining clear,yellow urine. Problem: Serum Glucose Level - Abnormal:  Goal: Ability to maintain appropriate glucose levels will improve  Description: Ability to maintain appropriate glucose levels will improve  Outcome: Ongoing  Note: Checking the patient's blood sugar before meals and at bedtime. Problem: Discharge Planning:  Goal: Discharged to appropriate level of care  Description: Discharged to appropriate level of care  Outcome: Ongoing  Note: Patient from home with , but social work helping patient find a SNF for rehab. Problem: Pain Control  Goal: Maintain pain level at or below patient's acceptable level (or 5 if patient is unable to determine acceptable level)  Outcome: Ongoing  Note: Pain Assessment: 0-10  Pain Level: 0   Patient's Stated Pain Goal: No pain   Is pain goal met at this time? Yes     Non-Pharmaceutical Pain Intervention(s): Rest    Patient has denied having any pain tonight. Problem: Cardiovascular  Goal: No DVT, peripheral vascular complications  Outcome: Ongoing  Note: No signs or symptoms of DVT. Patient getting daily Lovenox injections. Goal: Hemodynamic stability  Outcome: Ongoing  Note: Vital signs have remained stable and within normal limits. Vitals being monitored every 4 hours and as needed. Continuous cardiac monitor in place. Heart rhythm is NSR/ST. Problem: Respiratory  Goal: No pulmonary complications  Outcome: Ongoing  Note: Patient on 4-6L's during the day and BiPAP when SOB and nightly with oxygen saturations above 92%. Continuous pulse ox in place. Patient is SOB with ambulation and at rest. Lungs are diminished throughout with wheezing at times.    Goal: O2 Sat > 90%  Outcome: Ongoing  Note: Patient on

## 2021-07-21 ENCOUNTER — ANESTHESIA (OUTPATIENT)
Dept: ENDOSCOPY | Age: 66
DRG: 871 | End: 2021-07-21
Payer: COMMERCIAL

## 2021-07-21 ENCOUNTER — ANESTHESIA EVENT (OUTPATIENT)
Dept: ENDOSCOPY | Age: 66
DRG: 871 | End: 2021-07-21
Payer: COMMERCIAL

## 2021-07-21 ENCOUNTER — APPOINTMENT (OUTPATIENT)
Dept: GENERAL RADIOLOGY | Age: 66
DRG: 871 | End: 2021-07-21
Attending: INTERNAL MEDICINE
Payer: COMMERCIAL

## 2021-07-21 VITALS — DIASTOLIC BLOOD PRESSURE: 58 MMHG | OXYGEN SATURATION: 97 % | SYSTOLIC BLOOD PRESSURE: 114 MMHG

## 2021-07-21 LAB
ACINETOBACTER CALCOACETICUS-BAUMANNII BY PCR: NOT DETECTED
ADENOVIRUS BY PCR: NOT DETECTED
ANION GAP SERPL CALCULATED.3IONS-SCNC: 5 MEQ/L (ref 8–16)
BUN BLDV-MCNC: 25 MG/DL (ref 7–22)
CALCIUM SERPL-MCNC: 9.8 MG/DL (ref 8.5–10.5)
CHLAMYDIA PNEUMONIAE BY PCR: NOT DETECTED
CHLORIDE BLD-SCNC: 93 MEQ/L (ref 98–111)
CO2: 46 MEQ/L (ref 23–33)
CREAT SERPL-MCNC: 0.8 MG/DL (ref 0.4–1.2)
ENTEROBACTER CLOACAE COMPLEX BY PCR: NOT DETECTED
ERYTHROCYTE [DISTWIDTH] IN BLOOD BY AUTOMATED COUNT: 14.6 % (ref 11.5–14.5)
ERYTHROCYTE [DISTWIDTH] IN BLOOD BY AUTOMATED COUNT: 55.3 FL (ref 35–45)
ESCHERICHIA COLI BY PCR: NOT DETECTED
FOLATE: 17 NG/ML (ref 4.8–24.2)
GFR SERPL CREATININE-BSD FRML MDRD: 72 ML/MIN/1.73M2
GLUCOSE BLD-MCNC: 177 MG/DL (ref 70–108)
GLUCOSE BLD-MCNC: 188 MG/DL (ref 70–108)
GLUCOSE BLD-MCNC: 210 MG/DL (ref 70–108)
GLUCOSE BLD-MCNC: 220 MG/DL (ref 70–108)
GLUCOSE BLD-MCNC: 223 MG/DL (ref 70–108)
GLUCOSE BLD-MCNC: 244 MG/DL (ref 70–108)
GLUCOSE BLD-MCNC: 250 MG/DL (ref 70–108)
HAEMOPHILUS INFLUENZAE BY PCR: NOT DETECTED
HAV IGM SER IA-ACNC: NEGATIVE
HCT VFR BLD CALC: 34.9 % (ref 37–47)
HEMOGLOBIN: 10.6 GM/DL (ref 12–16)
HEPATITIS B CORE IGM ANTIBODY: NEGATIVE
HEPATITIS B SURFACE ANTIGEN: NEGATIVE
HEPATITIS C ANTIBODY: NEGATIVE
INFLUENZA A BY PCR: NOT DETECTED
INFLUENZA B BY PCR: NOT DETECTED
KLEBSIELLA AEROGENES BY PCR: NOT DETECTED
KLEBSIELLA OXYTOCA BY PCR: NOT DETECTED
KLEBSIELLA PNEUMONIAE GROUP BY PCR: NOT DETECTED
LEGIONELLA PNEUMOPHILIA BY PCR: NOT DETECTED
MCH RBC QN AUTO: 31.3 PG (ref 26–33)
MCHC RBC AUTO-ENTMCNC: 30.4 GM/DL (ref 32.2–35.5)
MCV RBC AUTO: 102.9 FL (ref 81–99)
METAPNEUMOVIRUS BY PCR: NOT DETECTED
MORAXELLA CATARRHALIS BY PCR: NOT DETECTED
MYCOPLASMA PNEUMONIAE BY PCR: NOT DETECTED
NON-SARS CORONAVIRUS: NOT DETECTED
PARAINFLUENZA VIRUS BY PCR: DETECTED
PLATELET # BLD: 231 THOU/MM3 (ref 130–400)
PMV BLD AUTO: 12.1 FL (ref 9.4–12.4)
POTASSIUM SERPL-SCNC: 4.1 MEQ/L (ref 3.5–5.2)
PROTEUS SPECIES BY PCR: NOT DETECTED
PSEUDOMONAS AERUGINOSA BY PCR: NOT DETECTED
RBC # BLD: 3.39 MILL/MM3 (ref 4.2–5.4)
RESISTANT GENE CTX-M BY PCR: ABNORMAL
RESISTANT GENE IMP BY PCR: ABNORMAL
RESISTANT GENE KPC BY PCR: ABNORMAL
RESISTANT GENE MECA/C & MREJ BY PCR: ABNORMAL
RESISTANT GENE NDM BY PCR: ABNORMAL
RESISTANT GENE OXA-48-LIKE BY PCR: ABNORMAL
RESISTANT GENE VIM BY PCR: ABNORMAL
RESPIRATORY SYNCYTIAL VIRUS BY PCR: NOT DETECTED
RHINOVIRUS ENTEROVIRUS PCR: NOT DETECTED
SERRATIA MARCESCENS BY PCR: NOT DETECTED
SODIUM BLD-SCNC: 144 MEQ/L (ref 135–145)
SOURCE: ABNORMAL
SPECIMEN ACCEPTABILITY: ABNORMAL
STAPH AUREUS BY PCR: NOT DETECTED
STREP AGALACTIAE BY PCR: NOT DETECTED
STREP PNEUMONIAE BY PCR: NOT DETECTED
STREP PYOGENES BY PCR: NOT DETECTED
VITAMIN B-12: > 2000 PG/ML (ref 211–911)
WBC # BLD: 13.3 THOU/MM3 (ref 4.8–10.8)

## 2021-07-21 PROCEDURE — 0BJ08ZZ INSPECTION OF TRACHEOBRONCHIAL TREE, VIA NATURAL OR ARTIFICIAL OPENING ENDOSCOPIC: ICD-10-PCS | Performed by: INTERNAL MEDICINE

## 2021-07-21 PROCEDURE — 6370000000 HC RX 637 (ALT 250 FOR IP): Performed by: STUDENT IN AN ORGANIZED HEALTH CARE EDUCATION/TRAINING PROGRAM

## 2021-07-21 PROCEDURE — 87255 GENET VIRUS ISOLATE HSV: CPT

## 2021-07-21 PROCEDURE — 87205 SMEAR GRAM STAIN: CPT

## 2021-07-21 PROCEDURE — 3609011800 HC BRONCHOSCOPY/TRANSBRONCHIAL LUNG BIOPSY: Performed by: INTERNAL MEDICINE

## 2021-07-21 PROCEDURE — 2700000000 HC OXYGEN THERAPY PER DAY

## 2021-07-21 PROCEDURE — 87486 CHLMYD PNEUM DNA AMP PROBE: CPT

## 2021-07-21 PROCEDURE — 87798 DETECT AGENT NOS DNA AMP: CPT

## 2021-07-21 PROCEDURE — 82948 REAGENT STRIP/BLOOD GLUCOSE: CPT

## 2021-07-21 PROCEDURE — APPSS30 APP SPLIT SHARED TIME 16-30 MINUTES: Performed by: NURSE PRACTITIONER

## 2021-07-21 PROCEDURE — 2580000003 HC RX 258: Performed by: INTERNAL MEDICINE

## 2021-07-21 PROCEDURE — 87116 MYCOBACTERIA CULTURE: CPT

## 2021-07-21 PROCEDURE — 88305 TISSUE EXAM BY PATHOLOGIST: CPT

## 2021-07-21 PROCEDURE — 82746 ASSAY OF FOLIC ACID SERUM: CPT

## 2021-07-21 PROCEDURE — 2580000003 HC RX 258: Performed by: STUDENT IN AN ORGANIZED HEALTH CARE EDUCATION/TRAINING PROGRAM

## 2021-07-21 PROCEDURE — 87581 M.PNEUMON DNA AMP PROBE: CPT

## 2021-07-21 PROCEDURE — 87102 FUNGUS ISOLATION CULTURE: CPT

## 2021-07-21 PROCEDURE — 80074 ACUTE HEPATITIS PANEL: CPT

## 2021-07-21 PROCEDURE — 71045 X-RAY EXAM CHEST 1 VIEW: CPT

## 2021-07-21 PROCEDURE — 87070 CULTURE OTHR SPECIMN AEROBIC: CPT

## 2021-07-21 PROCEDURE — 87631 RESP VIRUS 3-5 TARGETS: CPT

## 2021-07-21 PROCEDURE — 3609027000 HC BRONCHOSCOPY: Performed by: INTERNAL MEDICINE

## 2021-07-21 PROCEDURE — 0B9D8ZX DRAINAGE OF RIGHT MIDDLE LUNG LOBE, VIA NATURAL OR ARTIFICIAL OPENING ENDOSCOPIC, DIAGNOSTIC: ICD-10-PCS | Performed by: INTERNAL MEDICINE

## 2021-07-21 PROCEDURE — 2060000000 HC ICU INTERMEDIATE R&B

## 2021-07-21 PROCEDURE — 87206 SMEAR FLUORESCENT/ACID STAI: CPT

## 2021-07-21 PROCEDURE — 6360000002 HC RX W HCPCS: Performed by: STUDENT IN AN ORGANIZED HEALTH CARE EDUCATION/TRAINING PROGRAM

## 2021-07-21 PROCEDURE — 0BD68ZX EXTRACTION OF RIGHT LOWER LOBE BRONCHUS, VIA NATURAL OR ARTIFICIAL OPENING ENDOSCOPIC, DIAGNOSTIC: ICD-10-PCS | Performed by: INTERNAL MEDICINE

## 2021-07-21 PROCEDURE — 3700000001 HC ADD 15 MINUTES (ANESTHESIA): Performed by: INTERNAL MEDICINE

## 2021-07-21 PROCEDURE — 6370000000 HC RX 637 (ALT 250 FOR IP): Performed by: ANESTHESIOLOGY

## 2021-07-21 PROCEDURE — 85027 COMPLETE CBC AUTOMATED: CPT

## 2021-07-21 PROCEDURE — 31628 BRONCHOSCOPY/LUNG BX EACH: CPT | Performed by: INTERNAL MEDICINE

## 2021-07-21 PROCEDURE — 6370000000 HC RX 637 (ALT 250 FOR IP): Performed by: INTERNAL MEDICINE

## 2021-07-21 PROCEDURE — 0BD58ZX EXTRACTION OF RIGHT MIDDLE LOBE BRONCHUS, VIA NATURAL OR ARTIFICIAL OPENING ENDOSCOPIC, DIAGNOSTIC: ICD-10-PCS | Performed by: INTERNAL MEDICINE

## 2021-07-21 PROCEDURE — 7100000001 HC PACU RECOVERY - ADDTL 15 MIN: Performed by: INTERNAL MEDICINE

## 2021-07-21 PROCEDURE — 31624 DX BRONCHOSCOPE/LAVAGE: CPT | Performed by: INTERNAL MEDICINE

## 2021-07-21 PROCEDURE — 99232 SBSQ HOSP IP/OBS MODERATE 35: CPT | Performed by: INTERNAL MEDICINE

## 2021-07-21 PROCEDURE — 87541 LEGION PNEUMO DNA AMP PROB: CPT

## 2021-07-21 PROCEDURE — 94669 MECHANICAL CHEST WALL OSCILL: CPT

## 2021-07-21 PROCEDURE — 88112 CYTOPATH CELL ENHANCE TECH: CPT

## 2021-07-21 PROCEDURE — 2500000003 HC RX 250 WO HCPCS: Performed by: ANESTHESIOLOGY

## 2021-07-21 PROCEDURE — 7100000000 HC PACU RECOVERY - FIRST 15 MIN: Performed by: INTERNAL MEDICINE

## 2021-07-21 PROCEDURE — 3700000000 HC ANESTHESIA ATTENDED CARE: Performed by: INTERNAL MEDICINE

## 2021-07-21 PROCEDURE — 99233 SBSQ HOSP IP/OBS HIGH 50: CPT | Performed by: INTERNAL MEDICINE

## 2021-07-21 PROCEDURE — 6360000002 HC RX W HCPCS: Performed by: ANESTHESIOLOGY

## 2021-07-21 PROCEDURE — 94761 N-INVAS EAR/PLS OXIMETRY MLT: CPT

## 2021-07-21 PROCEDURE — 82607 VITAMIN B-12: CPT

## 2021-07-21 PROCEDURE — 36415 COLL VENOUS BLD VENIPUNCTURE: CPT

## 2021-07-21 PROCEDURE — 94660 CPAP INITIATION&MGMT: CPT

## 2021-07-21 PROCEDURE — 94640 AIRWAY INHALATION TREATMENT: CPT

## 2021-07-21 PROCEDURE — 51798 US URINE CAPACITY MEASURE: CPT

## 2021-07-21 PROCEDURE — 80048 BASIC METABOLIC PNL TOTAL CA: CPT

## 2021-07-21 RX ORDER — TRAMADOL HYDROCHLORIDE 50 MG/1
50 TABLET ORAL EVERY 6 HOURS PRN
Status: DISCONTINUED | OUTPATIENT
Start: 2021-07-21 | End: 2021-07-27 | Stop reason: HOSPADM

## 2021-07-21 RX ORDER — SODIUM CHLORIDE 450 MG/100ML
INJECTION, SOLUTION INTRAVENOUS CONTINUOUS
Status: DISCONTINUED | OUTPATIENT
Start: 2021-07-21 | End: 2021-07-22

## 2021-07-21 RX ORDER — IBUPROFEN 400 MG/1
400 TABLET ORAL EVERY 6 HOURS PRN
Status: DISCONTINUED | OUTPATIENT
Start: 2021-07-21 | End: 2021-07-27 | Stop reason: HOSPADM

## 2021-07-21 RX ORDER — LIDOCAINE 4 G/G
1 PATCH TOPICAL DAILY
Status: DISCONTINUED | OUTPATIENT
Start: 2021-07-21 | End: 2021-07-24 | Stop reason: SDUPTHER

## 2021-07-21 RX ORDER — PANTOPRAZOLE SODIUM 40 MG/1
40 TABLET, DELAYED RELEASE ORAL
Status: DISCONTINUED | OUTPATIENT
Start: 2021-07-21 | End: 2021-07-27 | Stop reason: HOSPADM

## 2021-07-21 RX ORDER — SUCCINYLCHOLINE/SOD CL,ISO/PF 200MG/10ML
SYRINGE (ML) INTRAVENOUS PRN
Status: DISCONTINUED | OUTPATIENT
Start: 2021-07-21 | End: 2021-07-21 | Stop reason: SDUPTHER

## 2021-07-21 RX ORDER — PROPOFOL 10 MG/ML
INJECTION, EMULSION INTRAVENOUS PRN
Status: DISCONTINUED | OUTPATIENT
Start: 2021-07-21 | End: 2021-07-21 | Stop reason: SDUPTHER

## 2021-07-21 RX ADMIN — IPRATROPIUM BROMIDE AND ALBUTEROL SULFATE 1 AMPULE: .5; 3 SOLUTION RESPIRATORY (INHALATION) at 20:47

## 2021-07-21 RX ADMIN — Medication 200 MG: at 12:37

## 2021-07-21 RX ADMIN — ROFLUMILAST 500 MCG: 500 TABLET ORAL at 07:40

## 2021-07-21 RX ADMIN — IPRATROPIUM BROMIDE AND ALBUTEROL SULFATE 1 AMPULE: .5; 3 SOLUTION RESPIRATORY (INHALATION) at 13:28

## 2021-07-21 RX ADMIN — METOPROLOL TARTRATE 25 MG: 25 TABLET, FILM COATED ORAL at 07:39

## 2021-07-21 RX ADMIN — ASPIRIN 81 MG: 81 TABLET, CHEWABLE ORAL at 07:40

## 2021-07-21 RX ADMIN — INSULIN HUMAN 4 UNITS: 100 INJECTION, SOLUTION PARENTERAL at 13:36

## 2021-07-21 RX ADMIN — HYDROCHLOROTHIAZIDE 25 MG: 25 TABLET ORAL at 07:39

## 2021-07-21 RX ADMIN — SODIUM CHLORIDE: 4.5 INJECTION, SOLUTION INTRAVENOUS at 12:16

## 2021-07-21 RX ADMIN — CEFTRIAXONE SODIUM 1000 MG: 1 INJECTION, POWDER, FOR SOLUTION INTRAMUSCULAR; INTRAVENOUS at 05:31

## 2021-07-21 RX ADMIN — PREDNISONE 40 MG: 20 TABLET ORAL at 07:40

## 2021-07-21 RX ADMIN — TRAMADOL HYDROCHLORIDE 50 MG: 50 TABLET ORAL at 16:17

## 2021-07-21 RX ADMIN — INSULIN LISPRO 2 UNITS: 100 INJECTION, SOLUTION INTRAVENOUS; SUBCUTANEOUS at 16:18

## 2021-07-21 RX ADMIN — IPRATROPIUM BROMIDE AND ALBUTEROL SULFATE 1 AMPULE: .5; 3 SOLUTION RESPIRATORY (INHALATION) at 08:25

## 2021-07-21 RX ADMIN — FUROSEMIDE 20 MG: 20 TABLET ORAL at 07:40

## 2021-07-21 RX ADMIN — PROPOFOL 120 MG: 10 INJECTION, EMULSION INTRAVENOUS at 12:37

## 2021-07-21 RX ADMIN — INSULIN LISPRO 2 UNITS: 100 INJECTION, SOLUTION INTRAVENOUS; SUBCUTANEOUS at 11:11

## 2021-07-21 RX ADMIN — LISINOPRIL 20 MG: 20 TABLET ORAL at 07:40

## 2021-07-21 RX ADMIN — INSULIN LISPRO 1 UNITS: 100 INJECTION, SOLUTION INTRAVENOUS; SUBCUTANEOUS at 07:41

## 2021-07-21 RX ADMIN — PRAVASTATIN SODIUM 10 MG: 10 TABLET ORAL at 07:40

## 2021-07-21 RX ADMIN — SODIUM CHLORIDE, PRESERVATIVE FREE 10 ML: 5 INJECTION INTRAVENOUS at 19:25

## 2021-07-21 RX ADMIN — SODIUM CHLORIDE 25 ML: 9 INJECTION, SOLUTION INTRAVENOUS at 02:30

## 2021-07-21 RX ADMIN — IBUPROFEN 400 MG: 400 TABLET, FILM COATED ORAL at 19:25

## 2021-07-21 RX ADMIN — IPRATROPIUM BROMIDE AND ALBUTEROL SULFATE 1 AMPULE: .5; 3 SOLUTION RESPIRATORY (INHALATION) at 16:13

## 2021-07-21 ASSESSMENT — PULMONARY FUNCTION TESTS
PIF_VALUE: 38
PIF_VALUE: 1
PIF_VALUE: 23
PIF_VALUE: 38
PIF_VALUE: 39
PIF_VALUE: 39
PIF_VALUE: 35
PIF_VALUE: 3
PIF_VALUE: 39
PIF_VALUE: 35
PIF_VALUE: 38
PIF_VALUE: 39
PIF_VALUE: 31
PIF_VALUE: 39
PIF_VALUE: 38
PIF_VALUE: 45
PIF_VALUE: 1
PIF_VALUE: 38
PIF_VALUE: 38
PIF_VALUE: 39
PIF_VALUE: 4
PIF_VALUE: 39
PIF_VALUE: 38
PIF_VALUE: 1
PIF_VALUE: 45
PIF_VALUE: 38
PIF_VALUE: 38
PIF_VALUE: 39
PIF_VALUE: 39
PIF_VALUE: 1
PIF_VALUE: 33
PIF_VALUE: 35
PIF_VALUE: 39
PIF_VALUE: 1
PIF_VALUE: 3
PIF_VALUE: 38
PIF_VALUE: 39
PIF_VALUE: 38
PIF_VALUE: 35
PIF_VALUE: 38

## 2021-07-21 ASSESSMENT — PAIN - FUNCTIONAL ASSESSMENT
PAIN_FUNCTIONAL_ASSESSMENT: ACTIVITIES ARE NOT PREVENTED
PAIN_FUNCTIONAL_ASSESSMENT: 0-10
PAIN_FUNCTIONAL_ASSESSMENT: ACTIVITIES ARE NOT PREVENTED

## 2021-07-21 ASSESSMENT — PAIN DESCRIPTION - ONSET
ONSET: ON-GOING

## 2021-07-21 ASSESSMENT — PAIN DESCRIPTION - PAIN TYPE
TYPE: CHRONIC PAIN

## 2021-07-21 ASSESSMENT — PAIN DESCRIPTION - ORIENTATION
ORIENTATION: MID

## 2021-07-21 ASSESSMENT — PAIN SCALES - GENERAL
PAINLEVEL_OUTOF10: 5
PAINLEVEL_OUTOF10: 5
PAINLEVEL_OUTOF10: 6
PAINLEVEL_OUTOF10: 5
PAINLEVEL_OUTOF10: 0
PAINLEVEL_OUTOF10: 3
PAINLEVEL_OUTOF10: 4
PAINLEVEL_OUTOF10: 5

## 2021-07-21 ASSESSMENT — PAIN DESCRIPTION - DESCRIPTORS
DESCRIPTORS: CONSTANT

## 2021-07-21 ASSESSMENT — PAIN DESCRIPTION - FREQUENCY
FREQUENCY: CONTINUOUS

## 2021-07-21 ASSESSMENT — PAIN DESCRIPTION - LOCATION
LOCATION: BACK

## 2021-07-21 ASSESSMENT — PAIN DESCRIPTION - PROGRESSION
CLINICAL_PROGRESSION: NOT CHANGED

## 2021-07-21 ASSESSMENT — LIFESTYLE VARIABLES: SMOKING_STATUS: 1

## 2021-07-21 ASSESSMENT — COPD QUESTIONNAIRES: CAT_SEVERITY: SEVERE

## 2021-07-21 NOTE — PROGRESS NOTES
Pt is in recovery room on bipap. Received report from [address] rrt. Pt was transported on bipap to 4K13. Pt was taken off and placed on nasal cannula at 6lpm. RN aware.

## 2021-07-21 NOTE — PLAN OF CARE
Problem: Impaired respiratory status  Goal: Clear lung sounds  Outcome: Ongoing  Note: Patient's lung sounds are diminished with some expiratory wheezes. Problem: Serum Glucose Level - Abnormal:  Goal: Ability to maintain appropriate glucose levels will improve  Description: Ability to maintain appropriate glucose levels will improve  7/21/2021 0046 by Cuca Oneill RN  Outcome: Ongoing  Note: Patient's blood sugar being checked at meals and before bedtime. Problem: Pain Control  Goal: Maintain pain level at or below patient's acceptable level (or 5 if patient is unable to determine acceptable level)  Outcome: Ongoing  Note: Patient complains of some back pain and headache. Given pain medications when appropriate. Resting comfortably in bed. Problem: Cardiovascular  Goal: No DVT, peripheral vascular complications  Outcome: Ongoing  Note: Patient refused SCD cuffs. Problem: Cardiovascular  Goal: Hemodynamic stability  Outcome: Ongoing  Note: Blood pressures remaining stable. Problem: Respiratory  Goal: No pulmonary complications  Outcome: Ongoing     Problem: Respiratory  Goal: O2 Sat > 90%  Outcome: Ongoing  Note: Patient on Bipap and 5 L O2 nasal cannula when Bipap is off. O2 saturations have been greater than 90% throughout this shift. Problem: Respiratory  Goal: Supplemental O2 requirements decreased  Outcome: Ongoing     Problem: GI  Goal: No bowel complications  Outcome: Ongoing  Note: Bowel sounds are heard in all 4 quadrants. Patient is passing gas. Problem: Nutrition  Goal: Optimal nutrition therapy  Outcome: Ongoing  Note: Patient is NPO after midnight but had been eating and drinking adequately. Problem: Skin Integrity/Risk  Goal: No skin breakdown during hospitalization  Outcome: Ongoing  Note: No signs of skin breakdown noted. Barrier cream applied to buttocks. Patient refusing to turn.      Problem: Musculor/Skeletal Functional Status  Goal: Absence of falls  Outcome: Ongoing  Note: Patient is alert and oriented. Call light is within reach and is used appropriately. Bed is in lowest position. Problem: OXYGENATION/RESPIRATORY FUNCTION  Goal: Patient will maintain patent airway  Outcome: Ongoing  Note: Patient on the Bipap and 02 saturations have been above 90%. Patient not complaining of SOB. Problem: OXYGENATION/RESPIRATORY FUNCTION  Goal: Patient will achieve/maintain normal respiratory rate/effort  Description: Respiratory rate and effort will be within normal limits for the patient  Outcome: Ongoing     Problem:   Goal: Adequate urinary output  7/21/2021 0046 by Radha Franco RN  Outcome: Ongoing  Note: Patient using an external catheter and able to use the commode. Problem: Discharge Planning:  Goal: Discharged to appropriate level of care  Description: Discharged to appropriate level of care  Outcome: Ongoing  Note: Plans to go home with  or possibly to a rehab center in [place]. Care plan reviewed with patient. Patient verbalizes understanding of the plan of care and contribute to goal setting.

## 2021-07-21 NOTE — PROGRESS NOTES
Jacquie Roman 61  PHYSICAL THERAPY MISSED TREATMENT NOTE  [facility] ICU STEPDOWN TELEMETRY 4K    Date: 2021  Patient Name: Dallin Rasheed        MRN: [id number]   : 1955  (73 y.o.)  Gender: female                REASON FOR MISSED TREATMENT:  Hold treatment per nursing request.  Transport on way to take pt to bronch.

## 2021-07-21 NOTE — PROGRESS NOTES
Hospitalist Progress Note    Patient:  Babak Singh      Unit/Bed:4K-13/013-A    YOB: 1955    MRN: [id number]       Acct: [de-identified]     PCP: SUSAN Ann    Date of Admission: 2021       Assessment/Plan:    Acute on chronic respiratory failure [facility] hypoxia/hypercapnia 2/2 COPD exacerbation, GOLD 4 with FEV1 20%, Active.: On BiPaP /10, 0.45 FiO2 -  CXR showed pulmonary congestion and right middle lobe infiltrate with mild flattening of diaphgragm. Pulmonary consulted. CTA shows no pulmonary emboli seen. There is questionable pulmonary arterial hypertension. There is subtotal collapse of the right middle lobe which could be due to mucous plug. Cannot exclude other endobronchial pathology. No distinct lesion seen. Patient was examined by pulmonology and is scheduled for a bronchoscopy on 2021 at 1530 in the Endo suite. ABG's are as below.          -  AB.29, CO2 100, PO2 77, HCO3 48 BE 17 Sat 92%    -  Repeat ABG -7.34, CO2 91, PO2 83, HCO3 49, base excess 18.3, sat 94%       -  AB.41, PCO2 83, PO2 81, HCO3 52, BE 22.3, Sat 95%, FiO2 0.45    -  AB.40, pCO2 8, pO2 69, HCO3 50, BE 21.1. Sat 92% FiO2 .5                - Pulmonology Following. Bronch scheduled for 21 at 1530              - Continue Bipap at 16/8, 0.4 FiO2. Respiratory currently managing. Monitor Sats   - Continue Brovana, Pulmicort, Duoneb, Prednisone, roflumilast as prescribed. - Home BiPAP eval. Will need follow up PFT's as OP.      Sepsis (POA) 2/2 Community acquired pneumonia, Active.: SIRS 3/4 Positive present on admission with likely source of infection. Presenting vital signs were blood pressure 181/74, temperature 97, respiratory rate 27, 92 bpm.  WBCs 15.8. Initial lactic acid was 1.4. Portable chest x-ray demonstrates increased groundglass alveolar opacity in the right heart margin related to the medial segment of the right middle lobe.   Opacities consistent with early acute middle lobe alveolar pulmonary infection. VRE negative. Sputum production that is yellow and tick. WBC trending down to 13.8 (7/19). CRP elevated at 2.54. Sed rate elevated at 68. Potential for inflammatory etiology. CTA showed no pulmonary emboli. Subtotal collapse of the right middle lobe. To receive bronchoscopy tomorrow via pulmonology at 1530.               - Continue  Zithromax 500 QD x3 doses, Ceftriaxone 1g Q24h              - Pending Pneumonia panel, MRSA, COVID and Flu A/B              - Daily CBC, BMP              - Blood cultures x2 pending show NG currently. Symptomatic primary HTN, Resolved: Noted to be hypertensive on admission with complaints of headache. Continued on lisinopril-hydrochlorothiazide and metoprolol. Hold parameters in place.              - Continue to monitor vitals     Noninsulin-dependent diabetes mellitus type 2, Chronic/Stable: Hyperglycemic upon presentation with most recent  POC glucose at 198. Likely confounded by concomitant corticosteroid use. - Continue to hold metformin              - Continue low dose SSI with hypoglycemia protocol     Heart failure with preserved ejection fraction,  EF 60-65%, Chronic: noted. Continue Lasix. Urinary Tract Infection, Active: Incidental finding on urine culture. Patient has no signs or symptoms of urinary tract infection including no dysuria. Magana D/c'd. External catheter in place. Continue to monitor.        Hx of HLD, Stable: Noted.  Continue Pravachol        Expected discharge date:  TBD    Disposition:    [x] Home       [] TCU       [] Rehab       [] Psych       [] SNF       [] [place]       [] Other-    Chief Complaint: Progressive SOB    Hospital Course: Rae Bowser is a 73 y.o. female with a PMHx of severe COPD, HTN, non-insulin-dependent diabetes mellitus type 2, heart failure with preserved ejection fraction, and HLD who presents to [facility] with chief complaint of shortness of breath on BiPAP. She was a transfer from [facility] inpatient unit for potential bronchoscopy due to mucous plugging. Patient was intubated and extubated on 7/13/21 after being treated for 1 week of COPD exacerbation. Patient complains of shortness of breath, wheezing currently. Was placed on BiPAP while here initially at 26/10 0.5 FiO2. Currently on 24/8 with 0.5 FiO2. Still has increased work of breathing. Repeat ABG's show compensation and continued reducing CO2. Cleared for BiPAP use with daytime napping and continuous at bedtime. Home BiPAP evaluation. Patient to follow-up with pulmonology upon discharge for repeat PFTs. Pulmonology is following patient. They discussed Acapella and encouraged its use. Continue to monitor patient with intent to try to wean off BiPAP based on tolerance. MRSA screening was negative COVID-19 not detected rapid flu a and B- blood culture showed no growth x2, VRE was negative. Urine Cx showed E. Faecalis. Magana was D/c'd with external catheter in place. No symptoms of UTI      Subjective (past 24 hours): Patient states SOB is persisting. States she is not feeling improved really. Current BiPAP settings are 16/8, 0.4 FiO2. Currently complaining of  continued shortness of breath. Patient does remove BiPAP to eat and uses nasal cannula at the time. She denies chest pain, abdominal pain, chills, fever, dizziness, lightheadedness. Pt scheduled by pulmonology for Bronch today. Will follow up once results are in.      ROS (12 point review of systems completed. Pertinent positives noted. Otherwise ROS is negative).     Medications:  Reviewed    Infusion Medications    sodium chloride 100 mL/hr at 07/21/21 1216    sodium chloride 25 mL (07/21/21 0230)    dextrose       Scheduled Medications    lidocaine  1 patch Transdermal Daily    sodium chloride flush  5-40 mL Intravenous 2 times per day    [Held by provider] enoxaparin  40 mg non-focal.  Psychiatric: Alert and oriented, thought content appropriate, normal insight  Capillary Refill: Brisk,< 3 seconds   Peripheral Pulses: +2 palpable, equal bilaterally       Labs:   Recent Labs     07/19/21  0543 07/20/21  0534 07/21/21  0557   WBC 13.8* 15.5* 13.3*   HGB 12.3 11.6* 10.6*   HCT 41.5 39.0 34.9*    241 231     Recent Labs     07/19/21  0543 07/20/21  0534 07/21/21  0921    142 144   K 4.1 4.1 4.1   CL 92* 94* 93*   CO2 42* 40* 46*   BUN 38* 31* 25*   CREATININE 0.8 0.7 0.8   CALCIUM 9.1 9.4 9.8     Recent Labs     07/19/21  0543 07/20/21  0534   AST 35 20   * 94*   BILIDIR <0.2 <0.2   BILITOT 0.3 0.3   ALKPHOS 85 76     No results for input(s): INR in the last 72 hours. No results for input(s): [place] in the last 72 hours. Microbiology:      Urinalysis:    No results found for: Jessica Ficks, BACTERIA, RBCUA, BLOODU, SPECGRAV, Jaky [address]    Radiology:  XR CHEST PORTABLE   Final Result   1. Normal heart size. Moderate infiltrate in the right middle lobe following recent bronchoscopy, consistent with atelectasis/pneumonia versus post biopsy bleeding or retained fluid resulting from bronchial washings. 2. Tiny less than 5% pneumothorax right apex. 3. Small benign-appearing nodule in the right and left apical region, likely poorly calcified granulomas. **This report has been created using voice recognition software. It may contain minor errors which are inherent in voice recognition technology. **      Final report electronically signed by Dr. Je Phillip on 7/21/2021 1:50 PM      CTA CHEST W WO CONTRAST   Final Result   1. No pulmonary emboli are seen. Questionable pulmonary arterial hypertension. 2. Subtotal collapse of the right middle lobe which could be due to mucous plug but cannot exclude other endobronchial pathology. No distinct mass lesion is seen, however.                **This report has been created using voice recognition software. It may contain minor errors which are inherent in voice recognition technology. **          Final report electronically signed by Dr. Stevie Salazar on 7/19/2021 10:04 PM      XR CHEST PORTABLE   Final Result   Impression:   There is increased groundglass alveolar opacity in the right heart margin    related to the medial segment of the right middle lobe. The lungs are    otherwise clear. The right middle lobe opacity is consistent with early acute middle lobe    alveolar pulmonary infection.       This document has been electronically signed by: Val Quijano MD on    07/18/2021 04:26 AM      XR CHEST PORTABLE    (Results Pending)       DVT prophylaxis: [x] Lovenox                                 [] SCDs                                 [] SQ Heparin                                 [] Encourage ambulation           [] Already on Anticoagulation     Code Status: Full Code    PT/OT Eval Status: Ordered    Tele:   [x] yes             [] no    Electronically signed by Vik Sloan DO on 7/21/2021 at 3:44 PM

## 2021-07-21 NOTE — PROGRESS NOTES
[facility]      Patient - Anival Rao   MRN -  [id number]   [facility]   - 1955      Date of Admission -  2021 12:12 AM  Date of evaluation -  2021  Room - --A   Hospital Day - Geeta Wilkinson DO Primary Care Physician - SUSAN Luis     Problem List      Active Hospital Problems    Diagnosis Date Noted    Stage 4 very severe COPD by GOLD classification [facility]) [J44.9]     Smoker [F17.200]     Respiratory failure [facility]) [J96.90] 2021     Reason for Consult    Shortness of breath  HPI   History Obtained From: Patient and electronic medical record. Anival Rao is a 73 y.o. female,  PMHx off COPD, CHF,  DM type 2, HTN, HLD, is admitted to the hospital yesterday  for  shortness of breath and possible bronchocopy. Patient transferred from [facility] inpatient unit for bronchoscopy for mucous plug. Patient was being treated for COPD exacerbation for a week at [facility]; intubated and extubated on 21. She admits sputum production described as yellow and thick. Additional symptoms are headache, fatigue, abdominal pain. Denies blood in sputum. Reports associated headache. Denies chest pain, abdominal pain, dysuria, chills, fever, dizziness, lightheadedness     She is having shortness of breath: Yes  Onset: worsening past month  Duration: on 4L nasal canula home oxygen for yrs but 1months SOB is worsening. Functional status prior to beginning of symptoms: with NC Oxygen half block/s on level ground. Current functional capacity on level ground: 0 block/s on level ground. She can climb steps: No  She admits to orthopnea. Past 24 Hours   -Continues on BiPAP most of the time  -Bronchoscopy today   -Still with SOB- anxious for procedure  -No acute events overnight     -All systems reviewed.      PMHx   Past Medical History      Diagnosis Date    [facility]congestive heart failure) ([facility]     [facility]     Diabetes mellitus ([facility]     Diastolic dysfunction     Environmental and seasonal allergies     HLD (hyperlipidemia)     Hypertension       Past Surgical History        Procedure Laterality Date    APPENDECTOMY       SECTION      FOOT SURGERY      HERNIA REPAIR       Meds    Current Medications    sodium chloride flush  5-40 mL Intravenous 2 times per day    [Held by provider] enoxaparin  40 mg Subcutaneous Daily    cefTRIAXone (ROCEPHIN) IV  1,000 mg Intravenous Q24H    predniSONE  40 mg Oral Daily    pravastatin  10 mg Oral Daily    insulin lispro  0-6 Units Subcutaneous TID WC    insulin lispro  0-3 Units Subcutaneous Nightly    aspirin  81 mg Oral Daily    furosemide  20 mg Oral Daily    metoprolol tartrate  25 mg Oral BID    Roflumilast  500 mcg Oral Daily    lisinopril  20 mg Oral Daily    And    hydroCHLOROthiazide  25 mg Oral Daily    ipratropium-albuterol  1 ampule Inhalation 4x daily     acetaminophen, sodium chloride flush, sodium chloride, ipratropium-albuterol, glucose, dextrose, glucagon (rDNA), dextrose  IV Drips/Infusions   sodium chloride 25 mL (21 0230)    dextrose       Home Medications  Medications Prior to Admission: ipratropium-albuterol (DUONEB) 0.5-2.5 (3) MG/3ML SOLN nebulizer solution, Inhale 3 mLs into the lungs every 4 hours as needed for Shortness of Breath  budesonide (PULMICORT) 0.5 MG/2ML nebulizer suspension, Take 2 mLs by nebulization 2 times daily  albuterol-ipratropium (COMBIVENT RESPIMAT)  MCG/ACT AERS inhaler, Inhale 1 puff into the lungs every 6 hours as needed for Wheezing or Shortness of Breath  albuterol sulfate  (90 Base) MCG/ACT inhaler, Inhale 2 puffs into the lungs every 4 hours as needed for Wheezing or Shortness of Breath  levocetirizine (XYZAL) 5 MG tablet, Take 1 tablet by mouth nightly  fluticasone (FLONASE) 50 MCG/ACT nasal spray, 2 sprays by Nasal route daily  Roflumilast (DALIRESP) 500 MCG tablet, Take 1 tablet by mouth daily  aspirin 81 MG tablet, Take 81 mg by mouth daily  metoprolol tartrate (LOPRESSOR) 25 MG tablet, Take 25 mg by mouth 2 times daily  lisinopril-hydrochlorothiazide (PRINZIDE;ZESTORETIC) 20-25 MG per tablet, Take 1 tablet by mouth daily  metFORMIN (GLUCOPHAGE) 500 MG tablet, Take 500 mg by mouth 2 times daily (with meals) 1,000 mg at supper.   Cholecalciferol (VITAMIN D) 2000 units CAPS capsule, Take by mouth  Cyanocobalamin (B-12 PO), Take by mouth  pravastatin (PRAVACHOL) 10 MG tablet, Take 10 mg by mouth daily  Pseudoephedrine-Guaifenesin (MUCINEX D PO), Take by mouth as needed   Ibuprofen (ADVIL PO), Take by mouth as needed  FUROSEMIDE PO, Take 20 mg by mouth   Sodium Chloride-Sodium Bicarb (AYR SALINE NASAL RINSE) 1.57 g PACK, 1 packet by Nasal route 2 times daily as needed (sinus congestion) (Patient not taking: Reported on 5/26/2021)  Multiple Vitamins-Minerals (THERAPEUTIC MULTIVITAMIN-MINERALS) tablet, Take 1 tablet by mouth daily  Diet    Diet NPO  Allergies    Excedrin extra strength [aspirin-acetaminophen-caffeine], Norco [hydrocodone-acetaminophen], and Tylenol [acetaminophen]  Social History     Social History     Socioeconomic History    Marital status:      Spouse name: Not on file    Number of children: Not on file    Years of education: Not on file    Highest education level: Not on file   Occupational History    Not on file   Tobacco Use    Smoking status: Current Some Day Smoker     Packs/day: 1.00     Years: 42.00     Pack years: 42.00     Start date: 3/22/1978    Smokeless tobacco: Never Used    Tobacco comment: 1 pack every 2-3 weeks   Substance and Sexual Activity    Alcohol use: No    Drug use: No    Sexual activity: Not on file   Other Topics Concern    Not on file   Social History Narrative    Not on file     Social Determinants of Health     Financial Resource Strain:     Difficulty of Paying Living Expenses:    Food Insecurity:     Worried About [address] [facility] in the Last Year:     [address] in the Last Year:    Transportation Needs:     Lack of Transportation (Medical):  Lack of Transportation (Non-Medical):    Physical Activity:     Days of Exercise per Week:     Minutes of Exercise per Session:    Stress:     Feeling of Stress :    Social Connections:     Frequency of Communication with Friends and Family:     Frequency of Social Gatherings with Friends and Family:     Attends Confucianism Services:     Active Member of Clubs or Organizations:     Attends Club or Organization Meetings:     Marital Status:    Intimate Partner Violence:     Fear of Current or Ex-Partner:     Emotionally Abused:     Physically Abused:     Sexually Abused:      Family History    No family history on file. Sleep History    Never diagnosed with sleep apnea in the past.  Occupational history   Occupation:  She is current working: No  Type of profession: retired. History of tobacco smoking:Yes  Amount of tobacco smokin.0 PPD. Years of tobacco smokin. Quit smoking: Yes. Quit year:few yrs ago   Current smoker: No.       History of recreational or IV drug use in the past:NO     History of exposure to coal mines/coal dust: NO  History of exposure to foundry dust/welding: NO  History of exposure to quarry/silica/sandblasting: NO  History of exposure to asbestos/working with breaks/ships: NO  History of exposure to farm dust: NO  History of recent travel to long distances: NO  History of exposure to birds, pigeons, or chickens in the past:NO  History of pulmonary embolism in the past: No     denies       History of DVT in the past:No             Vitals     height is 5' 7.5\" (1.715 m) and weight is 228 lb 8 oz (103.6 kg). Her axillary temperature is 98.6 °F (37 °C). Her blood pressure is 119/69 and her pulse is 89.  Her respiration is 17 and oxygen saturation is 95%. Body mass index is 35.26 kg/m². SUPPLEMENTAL O2: O2 Flow Rate (L/min): 5 L/min     I/O        Intake/Output Summary (Last 24 hours) at 7/21/2021 1119  Last data filed at 7/21/2021 1048  Gross per 24 hour   Intake 1316.33 ml   Output 1750 ml   Net -433.67 ml     I/O last 3 completed shifts: In: 1436.3 [P.O.:560; I.V.:876.3]  Out: 1350 [Urine:1350]   Patient Vitals for the past 96 hrs (Last 3 readings):   Weight   07/21/21 0530 228 lb 8 oz (103.6 kg)   07/20/21 0515 228 lb 1.6 oz (103.5 kg)   07/18/21 0029 237 lb (107.5 kg)       Exam   Constitutional: Patient appears in no distress on BiPAP with full face mask. Anxious  Mouth/Throat: Exam is limited by full facemask with the BiPAP  Neck: Neck supple. Cardiovascular: S1 and S2 heard. No murmurs. Pulmonary/Chest: Bilateral air entry present. Good breath sounds on both sides, diminished at bases right more than left I. No wheezes. No rales. Abdominal: Soft. Obese. no tenderness. Extremities: Patient exhibits trace leg edema. Neurological: Patient is awake and alert.      Labs  - Old records and notes have been reviewed in [facility]   ABG  Lab Results   Component Value Date    PH 7.40 07/20/2021    PO2 69 07/20/2021    PCO2 81 07/20/2021    HCO3 50 07/20/2021    O2SAT 92 07/20/2021     Lab Results   Component Value Date    IFIO2 5 07/20/2021    MODE BiLevel 07/18/2021    SETPEEP 10.0 07/19/2021     CBC  Recent Labs     07/19/21  0543 07/20/21  0534 07/21/21  0557   WBC 13.8* 15.5* 13.3*   RBC 3.93* 3.81* 3.39*   HGB 12.3 11.6* 10.6*   HCT 41.5 39.0 34.9*   .6* 102.4* 102.9*   MCH 31.3 30.4 31.3   MCHC 29.6* 29.7* 30.4*    241 231   MPV 11.6 11.2 12.1      BMP  Recent Labs     07/19/21  0543 07/20/21  0534 07/21/21  0921    142 144   K 4.1 4.1 4.1   CL 92* 94* 93*   CO2 42* 40* 46*   BUN 38* 31* 25*   CREATININE 0.8 0.7 0.8   GLUCOSE 165* 177* 188*   CALCIUM 9.1 9.4 9.8     LFT  Recent Labs 07/19/21  0543 07/20/21  0534   AST 35 20   * 94*   BILITOT 0.3 0.3   ALKPHOS 85 76     TROP  No results found for: TROPONINT  BNP  No results for input(s): BNP in the last 72 hours. Lactic Acid  No results for input(s): LACTA in the last 72 hours. INR  No results for input(s): INR, PROTIME in the last 72 hours. PTT  No results for input(s): APTT in the last 72 hours. Glucose  Recent Labs     07/20/21 2023 07/21/21  0634 07/21/21  1103   POCGLU 273* 177* 220*     UA No results for input(s): SPECGRAV, PHUR, COLORU, CLARITYU, MUCUS, PROTEINU, BLOODU, RBCUA, WBCUA, BACTERIA, NITRU, GLUCOSEU, BILIRUBINUR, UROBILINOGEN, KETUA, LABCAST, LABCASTTY, AMORPHOS in the last 72 hours. Invalid input(s): CRYSTALS. PFTs     11/4/2020        Sleep studies   Pt stated that she had sleep study a yr ago and never diagnosed with sleep apnea    Cultures    Urine culture: gram (+) cocci chains  VRE (-)  Blood culture: no growth; prelim  COVID-19, Rapid [[id number]] Collected: 07/19/21 1520   Order Status: Completed Specimen: Nasopharyngeal Swab Updated: 07/19/21 1540    SARS-CoV-2, NAAT NOT DETECTED    Comment: Rapid NAAT:   Negative results should be treated as presumptive and,   if inconsistent with clinical signs and symptoms or necessary for   patient management, should be tested with an alternative molecular   assay. Negative results do not preclude SARS-CoV-2 infection and   should not be used as the sole basis for patient management decisions. This test has been authorized by the FDA under an Emergency Use   Authorization (EUA) for use by authorized laboratories.      Fact sheet for Healthcare Providers:   Murali   Fact sheet for Patients: Murali     METHODOLOGY: Isothermal Nucleic Acid Amplification   Performed at [address] [place] [address], [address]           EKG   Normal sinus rhythm  Right bundle branch block  Abnormal ECG  No previous ECGs available  Echocardiogram   2/19/20      Radiology    CXR 7/18/2021  Findings:   No pleural effusion. Heart size normal.   No acute fracture. Impression   Impression:   There is increased groundglass alveolar opacity in the right heart margin    related to the medial segment of the right middle lobe. The lungs are    otherwise clear. The right middle lobe opacity is consistent with early acute middle lobe    alveolar pulmonary infection. 7/19/2021   CTA THORAX / PULMONARY EMBOLUS STUDY:   1. No pulmonary emboli are seen. Questionable pulmonary arterial hypertension. 2. Subtotal collapse of the right middle lobe which could be due to mucous plug but cannot exclude other endobronchial pathology. No distinct mass lesion is seen, however. CT Scans 10/22/2019  CT Lung screening. Negative for acute changes and pulmonary nodules. Assessment:Plan   Acute on chronic hypoxic and hypercapnic respiratory failure secondary to COPD exacerbation.  -Positive for - SOB worsening, on BiPAP;ABG- PCO2 91, pH-7.34, HCO3 49  - keep BiPAP , monitoring O2sat,  -Continue antibiotics, steroids, bronchodilators.   -No current indications for bronchoscopy (Pt is educated about this)  -Pt is informed about smoking cessation. Community acquired pneumonia  -Positive for SOB, malaisia, leukocytosis, CXR infiltrate on right middle lobe  - MRSA and  VRE PCRs negative  -Blood cultures x2, pending    COPD exacerbation.  GOLD 4 stage  -after discharge PFTs  -Home BiPAP eval    DM type 2     HTN    -Bronchoscopy today 7/21/21 at 1530 in ENDO suite  -NPO after midnight- may resume diet after procedure and patient is awake   -HOLD AM dose of Lovenox   -Schedule patient for Bronchoscopy with  biopsy under fluoroscopy in endoscopy suite at [address] [place] [address] with anesthesia from anaesthesilogy department to further evaluate the etiology of abnormal CT scan of chest with right middle lobe collapse to rule out endobronchial lesions VS mucous plugs.  -Cindi Sanz educated and informed about bronchoscopy procedure,method, complicantions of procedure including but not limited to development of pneumothorax with requirement of chest tube placement. She agreed for the procedure and verbalizes understanding.  -She was also informed about the increased risk of kimberly and postoperative pulmonary complications from above planned bronchoscopy under general anesthesia given her history of very severe COPD and chronic respiratory failure. Patient was also informed about the possibility of the requirement of long-term ventilator requirement with the possibility of tracheostomy for weaning. Patient agreed to go for the above procedure. She verbalized understanding.    -BiPAP 26/10 cm H2O- to be used with daytime napping and at bedtime   -Will work on home BiPAP eval after procedure and patient is not so acutely ill/SOB  -PFT reviewed   -ECHO reviewed. -Continue Brovana, Pulmicort, and Duonebs QID- Metaneb  -VTE prophylaxis: Lovenox/SCDs  -ATB per primary  -Acapella/IS discussed and encouraged use   -Pneumonia panel pending   -Sleep study reviewed from 2018    Plan of car discussed with patient and primary RN  Meds and orders reviewed  Questions and concerns addressed. Electronically signed by   SEVERIANO Haddad CNP on 7/21/2021 at 11:19 AM     Addendum by Dr. Natasha Aleman MD:  I have seen and examined the patient independently. Face to face evaluation and examination was performed. The above evaluation and note has been reviewed. Labs and radiographs were reviewed. I Have discussed with Ms. IVAN Haddad CNP about this patient in detail. The above assessment and plan has been reviewed. Please see my modifications mentioned below.      My modifications:  She still having shortness of breath  Tolerating 5 L of oxygen on nasal cannula  Kept n.p.o. for bronchoscopy today  -Cindi Sanz educated and informed about bronchoscopy procedure,method, complicantions of procedure including but not limited to development of pneumothorax with requirement of chest tube placement. She agreed for the procedure and verbalizes understanding. I spoke with the patient family including the patient  Mr. Ness Davis, patient's son Mr. Roger and Ms. Multani patient daughter. I informed about the patient current clinical condition, bronchoscopy procedure and its associated complications including but not limited to development of pneumothorax with requirement of chest tube placement, respiratory failure requiring a prolonged mechanical ventilation with the possibility of tracheostomy for weaning. They are all in agreement with the patient's decision and with me regarding going for bronchoscopy for further evaluation of etiology of right middle lobe collapse.     Rei Enamorado MD 7/21/2021 6:41 PM

## 2021-07-21 NOTE — PRE SEDATION
[address] [address] Endoscopy  Sedation Pre-Procedure Note    Patient Name: Stevenson Danielle    YOB: 1955   Room/Bed: [address] [place] [address]  Medical Record Number: [id number]  Date: 2021  Time: 1:20 PM     Indication:  Pain control for Flexible Fibrooptic Bronchoscopy. Consent: I have discussed with the patient and/or the patient representative the indication, alternatives, and the possible risks and/or complications of the planned procedure and the anesthesia methods. The patient and/or patient representative appear to understand and agree to proceed.     Vital Signs: BP (!) 110/59   Pulse 92   Temp 97.1 °F (36.2 °C) (Temporal)   Resp 23   Ht 5' 7.5\" (1.715 m)   Wt 228 lb 8 oz (103.6 kg)   SpO2 93%   BMI 35.26 kg/m²       Past Medical History:  Past Medical History:   Diagnosis Date    CHF (congestive heart failure) (HCC)     COPD (chronic obstructive pulmonary disease) (HCC)     Diabetes mellitus (HCC)     Diastolic dysfunction     Environmental and seasonal allergies     HLD (hyperlipidemia)     Hypertension          Allergy:  Allergies   Allergen Reactions    Excedrin Extra Strength [Aspirin-Acetaminophen-Caffeine] Other (See Comments)    Norco [Hydrocodone-Acetaminophen]     Tylenol [Acetaminophen] Nausea And Vomiting         Past Surgical History:  Past Surgical History:   Procedure Laterality Date    APPENDECTOMY       SECTION      FOOT SURGERY      HERNIA REPAIR         Medications:   Current Facility-Administered Medications   Medication Dose Route Frequency Provider Last Rate Last Admin    0.45 % sodium chloride infusion   Intravenous Continuous Natalie Mcfarland  mL/hr at 21 1216 New Bag at 21 1216    acetaminophen (TYLENOL) tablet 650 mg  650 mg Oral Q4H PRN Jazmin Kenyon, DO        sodium chloride flush 0.9 % injection 5-40 mL  5-40 mL Intravenous 2 times per day Elvin Carballo, DO   10 mL at 07/19/21 2058    sodium chloride flush 0.9 % injection 10 mL  10 mL Intravenous PRN Elvin Carballo, DO        0.9 % sodium chloride infusion  25 mL Intravenous PRN Elvin Carballo,  mL/hr at 07/21/21 0230 25 mL at 07/21/21 0230    [Held by provider] enoxaparin (LOVENOX) injection 40 mg  40 mg Subcutaneous Daily Elvin Carballo, DO   40 mg at 07/20/21 0820    cefTRIAXone (ROCEPHIN) 1000 mg IVPB in 50 mL D5W minibag  1,000 mg Intravenous Q24H Elvin Carballo, DO   Stopped at 07/21/21 0605    ipratropium-albuterol (DUONEB) nebulizer solution 1 ampule  1 ampule Inhalation Q6H PRN Elvin Carballo, DO   1 ampule at 07/19/21 0110    predniSONE (DELTASONE) tablet 40 mg  40 mg Oral Daily Elvin Carballo, DO   40 mg at 07/21/21 0740    pravastatin (PRAVACHOL) tablet 10 mg  10 mg Oral Daily Elvin Carballo, DO   10 mg at 07/21/21 0740    insulin lispro (HUMALOG) injection vial 0-6 Units  0-6 Units Subcutaneous TID WC Elvin Carballo, DO   2 Units at 07/21/21 1111    insulin lispro (HUMALOG) injection vial 0-3 Units  0-3 Units Subcutaneous Nightly Elvin Carballo, DO   2 Units at 07/20/21 2025    glucose (GLUTOSE) 40 % oral gel 15 g  15 g Oral PRN Elvin Carballo, DO        dextrose 50 % IV solution  12.5 g Intravenous PRN Elvin Carballo, DO        glucagon (rDNA) injection 1 mg  1 mg Intramuscular PRN Elvin Carballo, DO        dextrose 5 % solution  100 mL/hr Intravenous PRN Elvin Carballo, DO        aspirin chewable tablet 81 mg  81 mg Oral Daily Elvin Carballo, DO   81 mg at 07/21/21 0740    furosemide (LASIX) tablet 20 mg  20 mg Oral Daily Elvin Carballo, DO   20 mg at 07/21/21 0740    metoprolol tartrate (LOPRESSOR) tablet 25 mg  25 mg Oral BID Karma [place], DO   25 mg at 07/21/21 0739    Roflumilast (DALIRESP) tablet 500 mcg  500 mcg Oral Daily Elvin Carballo, DO   500 mcg at 07/21/21 0740    lisinopril (PRINIVIL;ZESTRIL) tablet 20 mg  20 mg Oral Daily Karma Finley, DO   20 mg at 07/21/21 0740    And    hydroCHLOROthiazide (HYDRODIURIL) tablet 25 mg  25 mg Oral Daily Yolanda Finley, DO   25 mg at 07/21/21 5938    ipratropium-albuterol (DUONEB) nebulizer solution 1 ampule  1 ampule Inhalation 4x daily Guille Foot, DO   1 ampule at 07/21/21 0825         Coumadin Use Last 7 Days:  no  Antiplatelet drug therapy use last 7 days: yes - Asprin  Other anticoagulant use last 7 days: Lovenox- held since yesterday       Pre-Sedation Documentation and Exam:   I have personally completed a history, physical exam & review of systems for this patient (see notes). Mallampati Airway Assessment:  Please see MD Elizabeth note for details. Prior History of Anesthesia Complications:   Please see MD Elizabeth note for details. ASA Classification:  Please see MD Elizabeth note for details. Sedation/ Anesthesia Plan:   Patient was given anesthesia by MD Elizabeth from anesthesiology dept. Please see detailed note by MD Elizabeth for details.       Natalie Mcfarland MD MD, [facility]  7/21/2021, 1:20 PM      Electronically signed by Natalie Mcfarland MD on 7/21/2021 at 1:19 PM

## 2021-07-21 NOTE — CARE COORDINATION
7/21/21, 10:07 AM EDT    DISCHARGE PLANNING EVALUATION      ROSCOE spoke with [facility] and confirmed that they received information SW faxed over. No PT note in yet but ROSCOE notified them when it is in SW will fax it so they can start precert.

## 2021-07-21 NOTE — PROGRESS NOTES
Josh Arm admitted to [facility] for bronchoscopy with dr. Ana Garcia. Consent forms signed and verified with pt .

## 2021-07-21 NOTE — OP NOTE
Bronchoscopy procedure note under general anesthesia    Patient: Gael Matthew  : 1955      Operation: Flexible diagnostic fiberoptic bronchoscopy with fluoroscopy. During procedure following procedures were performed:  Bronch washings obtained from right middle and lower lobes  Trans bronchial lung biopsies were performed by using biopsy forceps from right middle lobe under fluroscopy guidance. Date of Operation: 2021    Indication for procedure: Right middle lobe collapse of uncertain etiology. To rule out endobronchial lesions Vs mucus plugs. Pre operative diagnoses:   Right middle lobe collapse. Abnormal CT chest.  Acute hypercapnic respiratory failure. COPD exacerbation. Post operative diagnoses:   Right middle lobe collapse. Abnormal CT chest.  Acute hypercapnic respiratory failure. COPD exacerbation. Surgeon: Governor Celia MD    Consent: Gael Matthew is a 73 y.o. female . The risks, benefits, complications, treatment options and expected outcomes were discussed with the patient. Consent obtained from the patient after explaining the risks and complications of the procedure. The possibilities of reaction to medication, pulmonary aspiration, perforation of a viscus, development of pneumothorax with requirement of chest tube placement,air way bleeding, failure to diagnose a condition and creating a complication leading to respiratory failure requiring mechanical ventilation, transfusion or operation were discussed with the patient who freely signed the consent. The patient was counseled at length about the risks of surinder Covid-19 during their perioperative period and any recovery window from their procedure. The patient was made aware that surinder Covid-19  may worsen their prognosis for recovering from their procedure  and lend to a higher morbidity and/or mortality risk.  All material risks, benefits, and reasonable alternatives including postponing the procedure were discussed. The patient does wish to proceed with the procedure at this time. Anesthesia: Patient was given general endotracheal anesthesia by Dr. Eleanor MD from anesthesiology dept. Please see anesthesiologist's note for details. Description of Procedure: The patient was taken to endoscopy suite, identified as Shannan Hardy and the procedure verified as Flexible Fiberoptic Bronchoscopy. A Time Out was held and the above information confirmed. After the induction of general  anesthesia by the anesthesiologist, the patient was placed in appropriate position and the Flexible Fibrooptic bronchoscope was advanced through the endotracheal tube after placing the Swivel adopter. The lower end of endotracheal tube was found to be in appropriate position. The scope was advanced into the trachea. The mallory is sharp with no growths. Careful inspection of the tracheal lumen below the lower end of endotracheal tube was accomplished and found to have no growths. The scope was  advanced into the right main bronchus and then into the Right upper lobe, Right middle lobe, and Right lower lobe bronchi and segmental bronchi. The scope was sequentially passed into the Left main and then Left upper and lower bronchi and segmental bronchi. Bronchioalveolar lavage: Bronchioalveolar lavage was attempted from right middle lobe. However after instilling 120ml of sterile 0.9 saline solution only 1ml of return was obtained due to collapsing of the airways during withdrawal of the BAL fluid. The BAL procedure was aborted at this time. Bronchial washings: Bronchial washings were performed form right middle and right lower lobe. More than 30 ml of Bronchial washings were obtained and sent to the laboratory for further analysis. Trans bronchial lung biopsies: Trans bronchial lung biopsies were performed by using biopsy forceps from right middle lobe.  A total of 4 biopsies obtained and sent to the laboratory for further analysis. Endobronchial findings:   Trachea: Normal mucosa. Milly: Normal mucosa  Right main bronchus: Normal mucosa  Right upper lobe bronchus: Normal mucosa  Right Middle lobe bronchus:She was noted to have thick light yellow colored mucus plug noted in the bronchus intermedius. The mucus plug was removed by suctioning. After removing mucus plug, she was noted to have normal mucosa  Right Lower lobe bronchus:Normal mucosa  Left main bronchus: Normal mucosa  Left upper lobe bronchus: Normal mucosa  Left lower lobe bronchus: Normal mucosa    No endobronchial lesions seen    The Patient was taken to the endoscopy recovery area in satisfactory condition. Estimated Blood Loss: Less than 5ml. Complications: None. Recommendation/Plan:  1. F/U on culturs results  2. F/U on cytology results  3. Follow transbronchial lung biopsy reports. 4. Follow post procedure portable chest Xray to R/o Pneumothorax. Follow up: She will be followed as in patient. Attestation: I performed the procedure.     Bernadette Chung MD      Electronically signed by Bernadette Chung MD on 7/21/2021 at 1:09 PM

## 2021-07-21 NOTE — CARE COORDINATION
7/21/21, 10:59 AM EDT    DISCHARGE ON GOING [address] day: 3  Location: [facility]/013-A Reason for admit: Respiratory failure [facility]) [J96.90]   Procedure:   7/18 CXR  There is increased groundglass alveolar opacity in the right heart margin   related to the medial segment of the right middle lobe.  The lungs are   otherwise clear. The right middle lobe opacity is consistent with early acute middle lobe   alveolar pulmonary infection. 7/19 CT Chest  Subtotal collapse of the right middle lobe which could be due to mucous plug  7/21 Bronchoscopy planned  Barriers to Discharge: From [facility]. Respiratory Failure w history COPD, CHF. WBC 13.3, (+) Urine Culture; Enterococcus Faecalis; monitor. Pulmonary plans procedure above today.  40% BIPAP versus 5L oxygen, IV AB continued  PCP: SUSAN Stokes  Readmission Risk Score: 10%     Patient Goals/Plan/Treatment Preferences: lives w spouse and son; prefers new [address] [place] for [place]hab needs w new BIPAP 26/10 has [employer] [facility] home oxygen 4L, has nebulizer, walker; therapy following; collaborated w ROSCOE Branham

## 2021-07-21 NOTE — PROGRESS NOTES
Photos taken, scope used Bronch SER#595, washings & biopsies obtained, specimens labeled & 2 jars sent to lab. Fluro time was 1:56, bronchoscopy completed, pt tolerated well.

## 2021-07-21 NOTE — PROGRESS NOTES
Pt has blanchable redness on her buttocks, but is refusing to turn. Pt offered assistance with turning at least every 2 hours. Pt educated on the risks of skin breakdown and refusing turns. Will continue to monitor.

## 2021-07-21 NOTE — PROGRESS NOTES
[address] THERAPY MISSED TREATMENT NOTE  [facility] ICU STEPDOWN TELEMETRY 4K  4K-13/013-A      Date: 2021  Patient Name: Nargis Schulte        CSN: [id number]   : 1955  (73 y.o.)  Gender: female   Referring Practitioner: Dione Lawson DO  Diagnosis: Respiratory failure         REASON FOR MISSED TREATMENT: Pt off floor for bronchoscopy upon arrival, per RN planned intubation during procedure and transfer to ICU following. Will re attempt for next scheduled session as appropriate.

## 2021-07-21 NOTE — ANESTHESIA POSTPROCEDURE EVALUATION
Department of Anesthesiology  Postprocedure Note    Patient: Marcos Richey  MRN: [id number]  YOB: 1955  Date of evaluation: 7/21/2021  Time:  3:56 PM     Procedure Summary     Date: 07/21/21 Room / Location: [address] [place] [address] Wuar Jones / Swapnil Guerra    Anesthesia Start: 1234 Anesthesia Stop: 7119    Procedures:       BRONCHOSCOPY (N/A )      BRONCHOSCOPY FLUOROSCOPY      BRONCHOSCOPY/TRANSBRONCHIAL LUNG BIOPSY Diagnosis: (RIGHT MIDDLE LOBE COLLAPSE)    Surgeons: Rei Enamorado MD Responsible Provider: Elva Slade MD    Anesthesia Type: general ASA Status: 4          Anesthesia Type: general    Tyron Phase I: Tyron Score: 9    Tyron Phase II:      Last vitals: Reviewed and per EMR flowsheets.        Anesthesia Post Evaluation    Patient location during evaluation: PACU  Patient participation: complete - patient participated  Level of consciousness: awake and alert  Airway patency: patent  Nausea & Vomiting: no nausea  Complications: no  Cardiovascular status: blood pressure returned to baseline and hemodynamically stable  Respiratory status: acceptable and spontaneous ventilation  Hydration status: euvolemic

## 2021-07-21 NOTE — ANESTHESIA PRE PROCEDURE
Department of Anesthesiology  Preprocedure Note       Name:  Anson Ahumada   Age:  73 y.o.  :  1955                                          MRN:  [id number]         Date:  2021      Surgeon: Aamir Mello):  Dwaine Bailey MD    Procedure: Procedure(s):  BRONCHOSCOPY    Medications prior to admission:   Prior to Admission medications    Medication Sig Start Date End Date Taking? Authorizing Provider   ipratropium-albuterol (DUONEB) 0.5-2.5 (3) MG/3ML SOLN nebulizer solution Inhale 3 mLs into the lungs every 4 hours as needed for Shortness of Breath 21  Yes SEVERIANO Moran CNP   budesonide (PULMICORT) 0.5 MG/2ML nebulizer suspension Take 2 mLs by nebulization 2 times daily 21  Yes SEVERIANO Moran CNP   albuterol-ipratropium (COMBIVENT RESPIMAT)  MCG/ACT AERS inhaler Inhale 1 puff into the lungs every 6 hours as needed for Wheezing or Shortness of Breath 21 Yes SEVERIANO Moran CNP   albuterol sulfate  (90 Base) MCG/ACT inhaler Inhale 2 puffs into the lungs every 4 hours as needed for Wheezing or Shortness of Breath 20  Yes SEVERIANO Thomas CNP   levocetirizine (XYZAL) 5 MG tablet Take 1 tablet by mouth nightly 20  Yes SEVERIANO Thomas CNP   fluticasone (FLONASE) 50 MCG/ACT nasal spray 2 sprays by Nasal route daily 20 Yes SEVERIANO Song CNP   Roflumilast (DALIRESP) 500 MCG tablet Take 1 tablet by mouth daily 20  Yes SEVERIANO Thomas CNP   aspirin 81 MG tablet Take 81 mg by mouth daily   Yes Historical Provider, MD   metoprolol tartrate (LOPRESSOR) 25 MG tablet Take 25 mg by mouth 2 times daily   Yes Historical Provider, MD   lisinopril-hydrochlorothiazide (PRINZIDE;ZESTORETIC) 20-25 MG per tablet Take 1 tablet by mouth daily   Yes Historical Provider, MD   metFORMIN (GLUCOPHAGE) 500 MG tablet Take 500 mg by mouth 2 times daily (with meals) 1,000 mg at supper.    Yes Historical Provider, MD   Cholecalciferol (VITAMIN D) 2000 units CAPS capsule Take by mouth   Yes Historical Provider, MD   Cyanocobalamin (B-12 PO) Take by mouth   Yes Historical Provider, MD   pravastatin (PRAVACHOL) 10 MG tablet Take 10 mg by mouth daily   Yes Historical Provider, MD   Pseudoephedrine-Guaifenesin ([place] D PO) Take by mouth as needed    Yes Historical Provider, MD   Ibuprofen (ADVIL PO) Take by mouth as needed   Yes Historical Provider, MD   FUROSEMIDE PO Take 20 mg by mouth    Yes Historical Provider, MD   Sodium Chloride-Sodium Bicarb (AYR SALINE NASAL RINSE) 1.57 g PACK 1 packet by Nasal route 2 times daily as needed (sinus congestion)  Patient not taking: Reported on 5/26/2021 2/19/20   Stiven Lan, APRN - CNP   Multiple Vitamins-Minerals (THERAPEUTIC MULTIVITAMIN-MINERALS) tablet Take 1 tablet by mouth daily    Historical Provider, MD       Current medications:    Current Facility-Administered Medications   Medication Dose Route Frequency Provider Last Rate Last Admin    0.45 % sodium chloride infusion   Intravenous Continuous Lobo De Los Santos  mL/hr at 07/21/21 1216 New Bag at 07/21/21 1216    acetaminophen (TYLENOL) tablet 650 mg  650 mg Oral Q4H PRN Chao Savers, DO        sodium chloride flush 0.9 % injection 5-40 mL  5-40 mL Intravenous 2 times per day Evlin Carballo, DO   10 mL at 07/19/21 2058    sodium chloride flush 0.9 % injection 10 mL  10 mL Intravenous PRN Elvin Carballo, DO        0.9 % sodium chloride infusion  25 mL Intravenous PRN Elvin Carballo,  mL/hr at 07/21/21 0230 25 mL at 07/21/21 0230    [Held by provider] enoxaparin (LOVENOX) injection 40 mg  40 mg Subcutaneous Daily Elvin Carballo, DO   40 mg at 07/20/21 0820    cefTRIAXone (ROCEPHIN) 1000 mg IVPB in 50 mL D5W minibag  1,000 mg Intravenous Q24H Elvin Carballo, DO   Stopped at 07/21/21 0605    ipratropium-albuterol (DUONEB) nebulizer solution 1 ampule  1 ampule Inhalation Q6H PRN Lisa Boyd, DO   1 ampule at 07/19/21 0110    predniSONE (DELTASONE) tablet 40 mg  40 mg Oral Daily Elvin Carballo, DO   40 mg at 07/21/21 0740    pravastatin (PRAVACHOL) tablet 10 mg  10 mg Oral Daily Elvin Carballo, DO   10 mg at 07/21/21 0740    insulin lispro (HUMALOG) injection vial 0-6 Units  0-6 Units Subcutaneous TID WC Elvin Carballo, DO   2 Units at 07/21/21 1111    insulin lispro (HUMALOG) injection vial 0-3 Units  0-3 Units Subcutaneous Nightly Elvin Carballo, DO   2 Units at 07/20/21 2025    glucose (GLUTOSE) 40 % oral gel 15 g  15 g Oral PRN Elvin Carballo, DO        dextrose 50 % IV solution  12.5 g Intravenous PRN Elvin Carballo, DO        glucagon (rDNA) injection 1 mg  1 mg Intramuscular PRN Elvin Carballo, DO        dextrose 5 % solution  100 mL/hr Intravenous PRN Evlin Carballo, DO        aspirin chewable tablet 81 mg  81 mg Oral Daily Elvin Carballo, DO   81 mg at 07/21/21 0740    furosemide (LASIX) tablet 20 mg  20 mg Oral Daily Elvin Carballo, DO   20 mg at 07/21/21 0740    metoprolol tartrate (LOPRESSOR) tablet 25 mg  25 mg Oral BID Mertie Sea Malia, DO   25 mg at 07/21/21 0739    Roflumilast (DALIRESP) tablet 500 mcg  500 mcg Oral Daily Elvin Carballo, DO   500 mcg at 07/21/21 0740    lisinopril (PRINIVIL;ZESTRIL) tablet 20 mg  20 mg Oral Daily Mertie Sea Malia, DO   20 mg at 07/21/21 0740    And    hydroCHLOROthiazide (HYDRODIURIL) tablet 25 mg  25 mg Oral Daily Mertie Sea Malia, DO   25 mg at 07/21/21 0739    ipratropium-albuterol (DUONEB) nebulizer solution 1 ampule  1 ampule Inhalation 4x daily Willian Green, DO   1 ampule at 07/21/21 0825       Allergies:     Allergies   Allergen Reactions    Excedrin Extra Strength [Aspirin-Acetaminophen-Caffeine] Other (See Comments)    Norco [Hydrocodone-Acetaminophen]     Tylenol [Acetaminophen] Nausea And Vomiting       Problem List:    Patient Active Problem List   Diagnosis Code    Respiratory failure ([facility] [address] [facility] J96.90    Stage 4 very severe [facility] by G[facility] classification [facility] J44.9    Smoker F17.200 Past Medical History:        Diagnosis Date    CHF (congestive heart failure) (HCC)     COPD (chronic obstructive pulmonary disease) (HCC)     Diabetes mellitus (HCC)     Diastolic dysfunction     Environmental and seasonal allergies     HLD (hyperlipidemia)     Hypertension        Past Surgical History:        Procedure Laterality Date    APPENDECTOMY       SECTION      FOOT SURGERY      HERNIA REPAIR         Social History:    Social History     Tobacco Use    Smoking status: Current Some Day Smoker     Packs/day: 1.00     Years: 42.00     Pack years: 42.00     Start date: 3/22/1978    Smokeless tobacco: Never Used    Tobacco comment: 1 pack every 2-3 weeks   Substance Use Topics    Alcohol use: No                                Ready to quit: Not Answered  Counseling given: Not Answered  Comment: 1 pack every 2-3 weeks      Vital Signs (Current):   Vitals:    21 0825 21 0833 21 1102 21 1159   BP:   119/69 (!) 110/59   Pulse:   89 92   Resp: 30    Temp:   98.6 °F (37 °C) 97.1 °F (36.2 °C)   TempSrc:   Axillary Temporal   SpO2: 94%  95% 93%   Weight:       Height:                                                  BP Readings from Last 3 Encounters:   21 (!) 110/59   21 122/76   20 132/80       NPO Status: Time of last liquid consumption: 930                        Time of last solid consumption:                         Date of last liquid consumption: 21                        Date of last solid food consumption: 21    BMI:   Wt Readings from Last 3 Encounters:   21 228 lb 8 oz (103.6 kg)   21 233 lb 3.2 oz (105.8 kg)   20 214 lb 12.8 oz (97.4 kg)     Body mass index is 35.26 kg/m².     CBC:   Lab Results   Component Value Date    WBC 13.3 2021    RBC 3.39 2021    HGB 10.6 2021    HCT 34.9 2021    .9 2021     2021       CMP:   Lab Results   Component Value Date     07/21/2021    K 4.1 07/21/2021    K 4.1 07/19/2021    CL 93 07/21/2021    CO2 46 07/21/2021    BUN 25 07/21/2021    CREATININE 0.8 07/21/2021    LABGLOM 72 07/21/2021    GLUCOSE 188 07/21/2021    PROT 5.6 07/20/2021    CALCIUM 9.8 07/21/2021    BILITOT 0.3 07/20/2021    ALKPHOS 76 07/20/2021    AST 20 07/20/2021    ALT 94 07/20/2021       POC Tests:   Recent Labs     07/21/21  1103   POCGLU 220*       Coags: No results found for: PROTIME, INR, APTT    HCG (If Applicable): No results found for: PREGTESTUR, PREGSERUM, HCG, HCGQUANT     ABGs: No results found for: PHART, PO2ART, AZG2UME, HDL1EUR, BEART, Z6DIJRFA     Type & Screen (If Applicable):  No results found for: LABABO, LABRH    Drug/Infectious Status (If Applicable):  Lab Results   Component Value Date    HEPCAB Negative 07/21/2021       COVID-19 Screening (If Applicable):   Lab Results   Component Value Date    COVID19 NOT DETECTED 07/19/2021           Anesthesia Evaluation   no history of anesthetic complications:   Airway: Mallampati: II  TM distance: >3 FB   Neck ROM: full  Mouth opening: > = 3 FB Dental:          Pulmonary:normal exam    (+) COPD: severe,  current smoker          Patient did not smoke on day of surgery. Cardiovascular:  Exercise tolerance: poor (<4 METS),   (+) hypertension:, CHF:,                   Neuro/Psych:   Negative Neuro/Psych ROS              GI/Hepatic/Renal:             Endo/Other:    (+) Diabetes, . Pt had no PAT visit       Abdominal:   (+) obese,           Vascular: negative vascular ROS. Other Findings:             Anesthesia Plan      general     ASA 4     (Possible post-procedure ventilation)  Induction: intravenous. MIPS: Postoperative trial extubation. Anesthetic plan and risks discussed with patient and spouse.                       Brianne Cottrell MD   7/21/2021

## 2021-07-22 ENCOUNTER — APPOINTMENT (OUTPATIENT)
Dept: GENERAL RADIOLOGY | Age: 66
DRG: 871 | End: 2021-07-22
Attending: INTERNAL MEDICINE
Payer: COMMERCIAL

## 2021-07-22 LAB
ALLEN TEST: ABNORMAL
BASE EXCESS (CALCULATED): 17.6 MMOL/L (ref -2.5–2.5)
COLLECTED BY:: ABNORMAL
DEVICE: ABNORMAL
GLUCOSE BLD-MCNC: 206 MG/DL (ref 70–108)
GLUCOSE BLD-MCNC: 306 MG/DL (ref 70–108)
GLUCOSE BLD-MCNC: 312 MG/DL (ref 70–108)
GLUCOSE BLD-MCNC: 428 MG/DL (ref 70–108)
HCO3: 47 MMOL/L (ref 23–28)
IFIO2: 4
O2 SATURATION: 84 %
PCO2: 85 MMHG (ref 35–45)
PH BLOOD GAS: 7.35 (ref 7.35–7.45)
PO2: 55 MMHG (ref 71–104)
SOURCE, BLOOD GAS: ABNORMAL

## 2021-07-22 PROCEDURE — 6370000000 HC RX 637 (ALT 250 FOR IP): Performed by: STUDENT IN AN ORGANIZED HEALTH CARE EDUCATION/TRAINING PROGRAM

## 2021-07-22 PROCEDURE — 94761 N-INVAS EAR/PLS OXIMETRY MLT: CPT

## 2021-07-22 PROCEDURE — 97163 PT EVAL HIGH COMPLEX 45 MIN: CPT

## 2021-07-22 PROCEDURE — 99233 SBSQ HOSP IP/OBS HIGH 50: CPT | Performed by: INTERNAL MEDICINE

## 2021-07-22 PROCEDURE — 99232 SBSQ HOSP IP/OBS MODERATE 35: CPT | Performed by: INTERNAL MEDICINE

## 2021-07-22 PROCEDURE — 6360000002 HC RX W HCPCS: Performed by: STUDENT IN AN ORGANIZED HEALTH CARE EDUCATION/TRAINING PROGRAM

## 2021-07-22 PROCEDURE — 82803 BLOOD GASES ANY COMBINATION: CPT

## 2021-07-22 PROCEDURE — 6370000000 HC RX 637 (ALT 250 FOR IP): Performed by: INTERNAL MEDICINE

## 2021-07-22 PROCEDURE — 71046 X-RAY EXAM CHEST 2 VIEWS: CPT

## 2021-07-22 PROCEDURE — 97530 THERAPEUTIC ACTIVITIES: CPT

## 2021-07-22 PROCEDURE — 94640 AIRWAY INHALATION TREATMENT: CPT

## 2021-07-22 PROCEDURE — 97110 THERAPEUTIC EXERCISES: CPT

## 2021-07-22 PROCEDURE — 94762 N-INVAS EAR/PLS OXIMTRY CONT: CPT

## 2021-07-22 PROCEDURE — 36600 WITHDRAWAL OF ARTERIAL BLOOD: CPT

## 2021-07-22 PROCEDURE — 2580000003 HC RX 258: Performed by: STUDENT IN AN ORGANIZED HEALTH CARE EDUCATION/TRAINING PROGRAM

## 2021-07-22 PROCEDURE — 94669 MECHANICAL CHEST WALL OSCILL: CPT

## 2021-07-22 PROCEDURE — 6360000002 HC RX W HCPCS: Performed by: NURSE PRACTITIONER

## 2021-07-22 PROCEDURE — 82948 REAGENT STRIP/BLOOD GLUCOSE: CPT

## 2021-07-22 PROCEDURE — APPSS30 APP SPLIT SHARED TIME 16-30 MINUTES: Performed by: NURSE PRACTITIONER

## 2021-07-22 PROCEDURE — 2060000000 HC ICU INTERMEDIATE R&B

## 2021-07-22 PROCEDURE — 97535 SELF CARE MNGMENT TRAINING: CPT

## 2021-07-22 RX ORDER — ARFORMOTEROL TARTRATE 15 UG/2ML
15 SOLUTION RESPIRATORY (INHALATION) 2 TIMES DAILY
Status: DISCONTINUED | OUTPATIENT
Start: 2021-07-22 | End: 2021-07-27 | Stop reason: HOSPADM

## 2021-07-22 RX ORDER — ALOGLIPTIN 12.5 MG/1
12.5 TABLET, FILM COATED ORAL DAILY
Status: DISCONTINUED | OUTPATIENT
Start: 2021-07-22 | End: 2021-07-27 | Stop reason: HOSPADM

## 2021-07-22 RX ORDER — BUDESONIDE 0.5 MG/2ML
0.5 INHALANT ORAL 2 TIMES DAILY
Status: DISCONTINUED | OUTPATIENT
Start: 2021-07-22 | End: 2021-07-27 | Stop reason: HOSPADM

## 2021-07-22 RX ADMIN — IBUPROFEN 400 MG: 400 TABLET, FILM COATED ORAL at 14:24

## 2021-07-22 RX ADMIN — METOPROLOL TARTRATE 25 MG: 25 TABLET, FILM COATED ORAL at 21:28

## 2021-07-22 RX ADMIN — INSULIN LISPRO 4 UNITS: 100 INJECTION, SOLUTION INTRAVENOUS; SUBCUTANEOUS at 12:21

## 2021-07-22 RX ADMIN — PANTOPRAZOLE SODIUM 40 MG: 40 TABLET, DELAYED RELEASE ORAL at 04:59

## 2021-07-22 RX ADMIN — INSULIN LISPRO 2 UNITS: 100 INJECTION, SOLUTION INTRAVENOUS; SUBCUTANEOUS at 08:04

## 2021-07-22 RX ADMIN — PREDNISONE 40 MG: 20 TABLET ORAL at 08:10

## 2021-07-22 RX ADMIN — TRAMADOL HYDROCHLORIDE 50 MG: 50 TABLET ORAL at 19:59

## 2021-07-22 RX ADMIN — SODIUM CHLORIDE, PRESERVATIVE FREE 10 ML: 5 INJECTION INTRAVENOUS at 08:09

## 2021-07-22 RX ADMIN — METFORMIN HYDROCHLORIDE 500 MG: 500 TABLET ORAL at 21:28

## 2021-07-22 RX ADMIN — FUROSEMIDE 20 MG: 20 TABLET ORAL at 08:10

## 2021-07-22 RX ADMIN — ROFLUMILAST 500 MCG: 500 TABLET ORAL at 08:10

## 2021-07-22 RX ADMIN — SODIUM CHLORIDE, PRESERVATIVE FREE 10 ML: 5 INJECTION INTRAVENOUS at 21:32

## 2021-07-22 RX ADMIN — IPRATROPIUM BROMIDE AND ALBUTEROL SULFATE 1 AMPULE: .5; 3 SOLUTION RESPIRATORY (INHALATION) at 22:58

## 2021-07-22 RX ADMIN — IBUPROFEN 400 MG: 400 TABLET, FILM COATED ORAL at 04:51

## 2021-07-22 RX ADMIN — ALOGLIPTIN 12.5 MG: 12.5 TABLET, FILM COATED ORAL at 21:28

## 2021-07-22 RX ADMIN — CEFTRIAXONE SODIUM 1000 MG: 1 INJECTION, POWDER, FOR SOLUTION INTRAMUSCULAR; INTRAVENOUS at 06:07

## 2021-07-22 RX ADMIN — PRAVASTATIN SODIUM 10 MG: 10 TABLET ORAL at 08:09

## 2021-07-22 RX ADMIN — BUDESONIDE 500 MCG: 0.5 INHALANT RESPIRATORY (INHALATION) at 17:32

## 2021-07-22 RX ADMIN — IPRATROPIUM BROMIDE AND ALBUTEROL SULFATE 1 AMPULE: .5; 3 SOLUTION RESPIRATORY (INHALATION) at 09:54

## 2021-07-22 RX ADMIN — INSULIN LISPRO 6 UNITS: 100 INJECTION, SOLUTION INTRAVENOUS; SUBCUTANEOUS at 17:15

## 2021-07-22 RX ADMIN — IPRATROPIUM BROMIDE AND ALBUTEROL SULFATE 1 AMPULE: .5; 3 SOLUTION RESPIRATORY (INHALATION) at 13:42

## 2021-07-22 RX ADMIN — ASPIRIN 81 MG: 81 TABLET, CHEWABLE ORAL at 08:10

## 2021-07-22 RX ADMIN — ARFORMOTEROL TARTRATE 15 MCG: 15 SOLUTION RESPIRATORY (INHALATION) at 17:26

## 2021-07-22 RX ADMIN — TRAMADOL HYDROCHLORIDE 50 MG: 50 TABLET ORAL at 12:30

## 2021-07-22 RX ADMIN — INSULIN LISPRO 8 UNITS: 100 INJECTION, SOLUTION INTRAVENOUS; SUBCUTANEOUS at 21:25

## 2021-07-22 RX ADMIN — TRAMADOL HYDROCHLORIDE 50 MG: 50 TABLET ORAL at 00:03

## 2021-07-22 RX ADMIN — IPRATROPIUM BROMIDE AND ALBUTEROL SULFATE 1 AMPULE: .5; 3 SOLUTION RESPIRATORY (INHALATION) at 17:27

## 2021-07-22 ASSESSMENT — PAIN DESCRIPTION - PAIN TYPE
TYPE: CHRONIC PAIN

## 2021-07-22 ASSESSMENT — PAIN SCALES - GENERAL
PAINLEVEL_OUTOF10: 5
PAINLEVEL_OUTOF10: 3
PAINLEVEL_OUTOF10: 0
PAINLEVEL_OUTOF10: 5
PAINLEVEL_OUTOF10: 0
PAINLEVEL_OUTOF10: 5
PAINLEVEL_OUTOF10: 6

## 2021-07-22 ASSESSMENT — PAIN DESCRIPTION - ORIENTATION
ORIENTATION: MID

## 2021-07-22 ASSESSMENT — PAIN - FUNCTIONAL ASSESSMENT
PAIN_FUNCTIONAL_ASSESSMENT: ACTIVITIES ARE NOT PREVENTED
PAIN_FUNCTIONAL_ASSESSMENT: ACTIVITIES ARE NOT PREVENTED

## 2021-07-22 ASSESSMENT — PAIN DESCRIPTION - FREQUENCY
FREQUENCY: CONTINUOUS
FREQUENCY: CONTINUOUS

## 2021-07-22 ASSESSMENT — PAIN DESCRIPTION - DESCRIPTORS
DESCRIPTORS: CONSTANT
DESCRIPTORS: CONSTANT

## 2021-07-22 ASSESSMENT — PAIN DESCRIPTION - LOCATION
LOCATION: BACK

## 2021-07-22 ASSESSMENT — PAIN DESCRIPTION - ONSET
ONSET: ON-GOING
ONSET: ON-GOING

## 2021-07-22 ASSESSMENT — PAIN DESCRIPTION - PROGRESSION
CLINICAL_PROGRESSION: NOT CHANGED
CLINICAL_PROGRESSION: NOT CHANGED

## 2021-07-22 NOTE — PLAN OF CARE
Problem: Impaired respiratory status  Goal: Clear lung sounds  Outcome: Ongoing  Note: Lung sounds are diminished with some expiratory wheezes. Problem: Serum Glucose Level - Abnormal:  Goal: Ability to maintain appropriate glucose levels will improve  Description: Ability to maintain appropriate glucose levels will improve  Outcome: Ongoing  Note: Patient's blood sugars are checked before meals and at bedtime. Last blood sugar at bedtime was 250. 2 units of insulin given. Patient understands the importance of blood sugar management. Problem: Pain Control  Goal: Maintain pain level at or below patient's acceptable level (or 5 if patient is unable to determine acceptable level)  Outcome: Ongoing  Note: Patient given ibuprofen and ultram for pain control. Rest and repositioned for comfort. Problem: Cardiovascular  Goal: No DVT, peripheral vascular complications  Outcome: Ongoing  Note: Patient refused SCD's during this shift. Problem: Cardiovascular  Goal: Hemodynamic stability  Outcome: Ongoing  Note: Blood pressures have been running slightly low. Lopressor was held due to blood pressure being 90/50. Problem: Respiratory  Goal: No pulmonary complications  Outcome: Ongoing     Problem: Respiratory  Goal: O2 Sat > 90%  Outcome: Ongoing  Note: 02 saturations have been above 90% throughout this shift. Patient on 02 nasal cannula 5 L. Bipap used as well. Problem: Respiratory  Goal: Supplemental O2 requirements decreased  Outcome: Ongoing     Problem: GI  Goal: No bowel complications  Outcome: Ongoing  Note: Bowel sounds heard in all 4 quadrants. Patient not complaining of any abdominal pain. Problem: Nutrition  Goal: Optimal nutrition therapy  Outcome: Ongoing  Note: Patient on a regular carb count diet. Patient has an adequate appetite. Problem: Skin Integrity/Risk  Goal: No skin breakdown during hospitalization  Outcome: Ongoing  Note: Patient refusing to turn.  Buttocks is red and blanchable. No skin breakdown noted. Problem: Musculor/Skeletal Functional Status  Goal: Absence of falls  Outcome: Ongoing  Note: Patient free of falls throughout this shift. Patient alert and oriented x4. Patient uses call light appropriately. Problem: OXYGENATION/RESPIRATORY FUNCTION  Goal: Patient will maintain patent airway  Outcome: Ongoing  Note: Patient states her breathing has improved since her procedure today. Problem: OXYGENATION/RESPIRATORY FUNCTION  Goal: Patient will achieve/maintain normal respiratory rate/effort  Description: Respiratory rate and effort will be within normal limits for the patient  Outcome: Ongoing     Problem:   Goal: Adequate urinary output  Outcome: Ongoing  Note: Patient has been up to the bedside commode this shift. Problem: Discharge Planning:  Goal: Discharged to appropriate level of care  Description: Discharged to appropriate level of care  Outcome: Ongoing  Note: Patient home with . Possible discharge to a rehab facility in [place]. Care plan reviewed with patient. Patient verbalizes understanding of the plan of care and contribute to goal setting.

## 2021-07-22 NOTE — PROGRESS NOTES
[facility]  INPATIENT PHYSICAL THERAPY  EVALUATION  [facility] ICU STEPDOWN TELEMETRY 4K - 4K-13/013-A    Time In:   Time Out: 2694  Timed Code Treatment Minutes: 8 Minutes  Minutes: 16          Date: 2021  Patient Name: Bria Davis,  Gender:  female        MRN: [id number]  : 1955  (73 y.o.)      Referring Practitioner: Dr. Janse Kwok  Diagnosis: respiratory failure  Additional Pertinent Hx: Per H&P, Amber Caballero is a 73 y.o. female,  PMHx off COPD, CHF,  DM type 2, HTN, HLD, is admitted to the hospital yesterday  for  shortness of breath and possible bronchocopy. Patient transferred from [facility] inpatient unit for bronchoscopy for mucous plug. Patient was being treated for COPD exacerbation for a week at [facility]; intubated and extubated on 21. She admits sputum production described as yellow and thick. Additional symptoms are headache, fatigue, abdominal pain. Denies blood in sputum. Reports associated headache. Denies chest pain, abdominal pain, dysuria, chills, fever, dizziness, lightheadedness. \"     Restrictions/Precautions:  Restrictions/Precautions: General Precautions, Fall Risk  Position Activity Restriction  Other position/activity restrictions: on 5L O2     Subjective:  Chart Reviewed: Yes  Patient assessed for rehabilitation services?: Yes  Subjective: pt c/o fatigue, cooperative    General:    Hearing: Within functional limits         Pain: 5 /10: chest/back per pt    Vitals: Oxygen: on 5L O2, with amb dec O2 sats to 84-85%, pt able to recover to 90-91% with cues for breathing technique and resting in sup in bed    Social/Functional History:    Lives With: Spouse, Son  Type of Home: House  Home Layout: One level  Home Access: Stairs to enter without rails  Entrance Stairs - Number of Steps: 1 ELLIE (~1 inch)  Home Equipment: Rolling walker, 4 wheeled walker, Cane (no AD used PTA)     Bathroom Shower/Tub: Walk-in shower, Shower chair with back  Bathroom Toilet: Standard  Bathroom Accessibility: Accessible       ADL Assistance: Independent  Homemaking Assistance: Needs assistance (Spouse and son complete)  Ambulation Assistance: Independent  Transfer Assistance: Independent          Additional Comments: on 4L Home O2    OBJECTIVE:  Range of Motion:  Bilateral Lower Extremity: WNL    Strength:  Bilateral Lower Extremity: WFL    Balance:  Static Sitting Balance:  Modified Independent  Dynamic Sitting Balance: Supervision, reaching in TIKI  Static Standing Balance: Stand By Assistance, with RW    Bed Mobility:  Rolling to Right: Supervision   Supine to Sit: Stand By Assistance  Sit to Supine: Stand By Assistance   Scooting: Stand By Assistance  HOB Up 35 degrees  Transfers:  Sit to Stand: Contact Guard Assistance  Stand to Sit:Stand By Assistance    Ambulation:  Contact Guard Assistance, to [facility]  Distance: 12'x1  Surface: Level Tile  Device:Rolling Walker  Gait Deviations:  Slow Shira, Mild Path Deviations and fatigued quickly and easily SOB on 5L O2    Exercise:  Reviewed sup BLE therex and discussed pt if feeling better up to chair for meals with  Nursing to inc activity-pt agreeable. Exercises were completed for increased independence with functional mobility. Functional Outcome Measures: Completed  AM-PAC Inpatient Mobility Raw Score : 18  AM-PAC Inpatient T-Scale Score : 43.63    ASSESSMENT:  Activity Tolerance:  Patient tolerance of  treatment: fair. Treatment Initiated: Treatment and education initiated within context of evaluation. Evaluation time included review of current medical information, gathering information related to past medical, social and functional history, completion of standardized testing, formal and informal observation of tasks, assessment of data and development of plan of care and goals.   Treatment time included skilled education and facilitation of tasks to increase safety and independence with functional mobility for improved independence and quality of life. Assessment: Body structures, Functions, Activity limitations: Decreased functional mobility , Decreased endurance, Decreased balance, Decreased strength, Increased pain  Assessment: pt tolerated fair, generalized weakness, deconditioning, dec endurance on 5L O2, dec balance, use of walker and inc assist for safe mobility, recommend cont PT to inc pt I with functional mobility  Prognosis: Good    REQUIRES PT FOLLOW UP: Yes  No Skilled PT: Independent with functional mobility     Discharge Recommendations:  Discharge Recommendations: Continue to assess pending progress, Subacute/Skilled Nursing Facility, Patient would benefit from continued therapy after discharge    Patient Education:  PT Education: Goals, PT Role, Plan of Care, Home Exercise Program, Functional Mobility Training    Equipment Recommendations: Other: cont to assess needs    Plan:  Times per week: 3-5X GM  Times per day: Daily  Specific instructions for Next Treatment: therex and mobility    Goals:  Patient goals : feel better  Short term goals  Time Frame for Short term goals: by discharge  Short term goal 1: bed mobility with MOD I to get in/out of bed  Short term goal 2: transfer with S to get in/out of chairs  Short term goal 3: amb >30'x1 with rollator and S to walk safely in room  Long term goals  Time Frame for Long term goals : no LTGs set secondary to short ELOS    Following session, patient left in safe position with all fall risk precautions in place.

## 2021-07-22 NOTE — PROGRESS NOTES
[facility]      Patient - Anson Ahumada   MRN -  [id number]   [facility]   - 1955      Date of Admission -  2021 12:12 AM  Date of evaluation -  2021  Room - -13013-A   Hospital Day - [facility] Primary Care Physician - Elnita Osler, PA     Problem List      Active Hospital Problems    Diagnosis Date Noted    Collapse of right lung [J98.11]     Abnormal CT of the chest [R93.89]     Stage 4 very severe COPD by GOLD classification ([facility] [address] [J44.9]     Smoker [F17.200]     Respiratory failure ([facility] [address] [J96.90] 2021     Reason for Consult    Shortness of breath  HPI   History Obtained From: Patient and electronic medical record. Anson Ahumada is a 73 y.o. female,  PMHx off COPD, CHF,  DM type 2, HTN, HLD, is admitted to the hospital yesterday  for  shortness of breath and possible bronchocopy. Patient transferred from [facility] inpatient unit for bronchoscopy for mucous plug. Patient was being treated for COPD exacerbation for a week at [facility]; intubated and extubated on 21. She admits sputum production described as yellow and thick. Additional symptoms are headache, fatigue, abdominal pain. Denies blood in sputum. Reports associated headache. Denies chest pain, abdominal pain, dysuria, chills, fever, dizziness, lightheadedness     She is having shortness of breath: Yes  Onset: worsening past month  Duration: on 4L nasal canula home oxygen for yrs but 1months SOB is worsening. Functional status prior to beginning of symptoms: with NC Oxygen half block/s on level ground. Current functional capacity on level ground: 0 block/s on level ground. She can climb steps: No  She admits to orthopnea.     Past 24 Hours   -Refused BiPAP last night  -ABG on 4LPM- compensated with high pCO2 of 85  -Bronchoscopy yesterday   -No pulmonary events overnight  -R PTX resolved per CXR this AM     -All systems reviewed.      PMHx   Past Medical History      Diagnosis Date    CHF (congestive heart failure) (HCC)     COPD (chronic obstructive pulmonary disease) (HCC)     Diabetes mellitus ([address]     Diastolic dysfunction     Environmental and seasonal allergies     HLD (hyperlipidemia)     Hypertension       Past Surgical History        Procedure Laterality Date    APPENDECTOMY      BRONCHOSCOPY N/A 7/21/2021    BRONCHOSCOPY performed by Maximino Ball MD at [facility]    BRONCHOSCOPY  7/21/2021    BRONCHOSCOPY FLUOROSCOPY performed by Maximino Ball MD at [facility]    BRONCHOSCOPY  7/21/2021    BRONCHOSCOPY/TRANSBRONCHIAL LUNG BIOPSY performed by Maximino Ball MD at [address] [place] [address] Drive      FOOT SURGERY      HERNIA REPAIR       Meds    Current Medications    lidocaine  1 patch Transdermal Daily    pantoprazole  40 mg Oral QAM AC    sodium chloride flush  5-40 mL Intravenous 2 times per day    [Held by provider] enoxaparin  40 mg Subcutaneous Daily    cefTRIAXone (ROCEPHIN) IV  1,000 mg Intravenous Q24H    pravastatin  10 mg Oral Daily    insulin lispro  0-6 Units Subcutaneous TID WC    insulin lispro  0-3 Units Subcutaneous Nightly    aspirin  81 mg Oral Daily    furosemide  20 mg Oral Daily    metoprolol tartrate  25 mg Oral BID    Roflumilast  500 mcg Oral Daily    lisinopril  20 mg Oral Daily    And    hydroCHLOROthiazide  25 mg Oral Daily    ipratropium-albuterol  1 ampule Inhalation 4x daily     traMADol, ibuprofen, acetaminophen, sodium chloride flush, sodium chloride, ipratropium-albuterol, glucose, dextrose, glucagon (rDNA), dextrose  IV Drips/Infusions   sodium chloride 25 mL (07/21/21 0230)    dextrose       Home Medications  Medications Prior to Admission: ipratropium-albuterol (DUONEB) 0.5-2.5 (3) MG/3ML SOLN nebulizer solution, Inhale 3 mLs into the lungs every 4 hours as needed for Shortness of Breath  budesonide (PULMICORT) 0.5 MG/2ML nebulizer suspension, Take 2 mLs by nebulization 2 times daily  albuterol-ipratropium (COMBIVENT RESPIMAT)  MCG/ACT AERS inhaler, Inhale 1 puff into the lungs every 6 hours as needed for Wheezing or Shortness of Breath  albuterol sulfate  (90 Base) MCG/ACT inhaler, Inhale 2 puffs into the lungs every 4 hours as needed for Wheezing or Shortness of Breath  levocetirizine (XYZAL) 5 MG tablet, Take 1 tablet by mouth nightly  fluticasone (FLONASE) 50 MCG/ACT nasal spray, 2 sprays by Nasal route daily  Roflumilast (DALIRESP) 500 MCG tablet, Take 1 tablet by mouth daily  aspirin 81 MG tablet, Take 81 mg by mouth daily  metoprolol tartrate (LOPRESSOR) 25 MG tablet, Take 25 mg by mouth 2 times daily  lisinopril-hydrochlorothiazide (PRINZIDE;ZESTORETIC) 20-25 MG per tablet, Take 1 tablet by mouth daily  metFORMIN (GLUCOPHAGE) 500 MG tablet, Take 500 mg by mouth 2 times daily (with meals) 1,000 mg at supper. Cholecalciferol (VITAMIN D) 2000 units CAPS capsule, Take by mouth  Cyanocobalamin (B-12 PO), Take by mouth  pravastatin (PRAVACHOL) 10 MG tablet, Take 10 mg by mouth daily  Pseudoephedrine-Guaifenesin (MUCINEX D PO), Take by mouth as needed   Ibuprofen (ADVIL PO), Take by mouth as needed  FUROSEMIDE PO, Take 20 mg by mouth   Sodium Chloride-Sodium Bicarb (AYR SALINE NASAL RINSE) 1.57 g PACK, 1 packet by Nasal route 2 times daily as needed (sinus congestion) (Patient not taking: Reported on 5/26/2021)  Multiple Vitamins-Minerals (THERAPEUTIC MULTIVITAMIN-MINERALS) tablet, Take 1 tablet by mouth daily  Diet    ADULT DIET;  Regular; 4 carb choices (60 gm/meal)  Allergies    Excedrin extra strength [aspirin-acetaminophen-caffeine], Norco [hydrocodone-acetaminophen], and Tylenol [acetaminophen]  Social History     Social History     Socioeconomic History    Marital status:      Spouse name: Not on file    Number of children: Not on file    Years of education: Not to foundry dust/welding: NO  History of exposure to quarry/silica/sandblasting: NO  History of exposure to asbestos/working with breaks/ships: NO  History of exposure to farm dust: NO  History of recent travel to long distances: NO  History of exposure to birds, pigeons, or chickens in the past:NO  History of pulmonary embolism in the past: No     denies       History of DVT in the past:No             Vitals     height is 5' 7.5\" (1.715 m) and weight is 227 lb 3.2 oz (103.1 kg). Her oral temperature is 97.9 °F (36.6 °C). Her blood pressure is 113/60 and her pulse is 107. Her respiration is 22 and oxygen saturation is 90%. Body mass index is 35.06 kg/m². SUPPLEMENTAL O2: O2 Flow Rate (L/min): 6 L/min (increased post ABG)     I/O        Intake/Output Summary (Last 24 hours) at 7/22/2021 1059  Last data filed at 7/22/2021 1033  Gross per 24 hour   Intake 300 ml   Output 950 ml   Net -650 ml     I/O last 3 completed shifts: In: 300 [P.O.:300]  Out: 850 [Urine:850]   Patient Vitals for the past 96 hrs (Last 3 readings):   Weight   07/22/21 0430 227 lb 3.2 oz (103.1 kg)   07/21/21 0530 228 lb 8 oz (103.6 kg)   07/20/21 0515 228 lb 1.6 oz (103.5 kg)       Exam   Constitutional: Patient appears in no distress on O2 per NC 4LPM. Anxious at times. Intermittent SOB  Mouth/Throat: Exam is limited by full facemask with the BiPAP  Neck: Neck supple. No JVD  Cardiovascular: NSR. S1 and S2 heard. No murmurs. Pulmonary/Chest: Bilateral air entry present. Good breath sounds on both sides, diminished at bases right more than left I. No wheezes. No rales. Abdominal: Soft. Obese. no tenderness. Extremities: Patient exhibits trace leg edema. Neurological: Patient is awake and alert.      Labs  - [facility]   ABG  Lab Results   Component Value Date    PH 7.35 07/22/2021    PO2 55 07/22/2021    PCO2 85 07/22/2021    HCO3 47 07/22/2021    O2SAT 84 07/22/2021     Lab Results   Component Value Date    IFIO2 4 07/22/2021    MODE BiLevel 07/18/2021    SETPEEP 10.0 07/19/2021     CBC  Recent Labs     07/20/21  0534 07/21/21  0557   WBC 15.5* 13.3*   RBC 3.81* 3.39*   HGB 11.6* 10.6*   HCT 39.0 34.9*   .4* 102.9*   MCH 30.4 31.3   MCHC 29.7* 30.4*    231   MPV 11.2 12.1      BMP  Recent Labs     07/20/21  0534 07/21/21  0921    144   K 4.1 4.1   CL 94* 93*   CO2 40* 46*   BUN 31* 25*   CREATININE 0.7 0.8   GLUCOSE 177* 188*   CALCIUM 9.4 9.8     LFT  Recent Labs     07/20/21  0534   AST 20   ALT 94*   BILITOT 0.3   ALKPHOS 76     TROP  No results found for: TROPONINT  BNP  No results for input(s): BNP in the last 72 hours. Lactic Acid  No results for input(s): LACTA in the last 72 hours. INR  No results for input(s): INR, PROTIME in the last 72 hours. PTT  No results for input(s): APTT in the last 72 hours. Glucose  Recent Labs     07/21/21  1930 07/21/21 2036 07/22/21  0744   POCGLU 250* 210* 206*     UA No results for input(s): SPECGRAV, PHUR, COLORU, CLARITYU, MUCUS, PROTEINU, BLOODU, RBCUA, WBCUA, BACTERIA, NITRU, GLUCOSEU, BILIRUBINUR, UROBILINOGEN, KETUA, LABCAST, LABCASTTY, AMORPHOS in the last 72 hours. Invalid input(s): CRYSTALS. PFTs     11/4/2020        Sleep studies   Pt stated that she had sleep study a yr ago and never diagnosed with sleep apnea    Cultures    Urine culture: gram (+) cocci chains  VRE (-)  Blood culture: no growth; prelim  COVID-19, Rapid [[id number]] Collected: 07/19/21 1520   Order Status: Completed Specimen: Nasopharyngeal Swab Updated: 07/19/21 1540    SARS-CoV-2, NAAT NOT DETECTED    Comment: Rapid NAAT:   Negative results should be treated as presumptive and,   if inconsistent with clinical signs and symptoms or necessary for   patient management, should be tested with an alternative molecular   assay. Negative results do not preclude SARS-CoV-2 infection and   should not be used as the sole basis for patient management decisions. This test has been authorized by the FDA under an Emergency Use   Authorization (EUA) for use by authorized laboratories. Fact sheet for Healthcare Providers:   Mario.es   Fact sheet for Patients: Mario.scar     METHODOLOGY: Isothermal Nucleic Acid Amplification   Performed at [facility], [address]         7/21/21  Bronchoscopy   Endobronchial findings:   Trachea: Normal mucosa. Milly: Normal mucosa  Right main bronchus: Normal mucosa  Right upper lobe bronchus: Normal mucosa  Right Middle lobe bronchus:She was noted to have thick light yellow colored mucus plug noted in the bronchus intermedius. The mucus plug was removed by suctioning. After removing mucus plug, she was noted to have normal mucosa  Right Lower lobe bronchus:Normal mucosa  Left main bronchus: Normal mucosa  Left upper lobe bronchus: Normal mucosa  Left lower lobe bronchus: Normal mucosa     No endobronchial lesions seen    7/21/21  Surgical Pathology  Clinical Information: RML OF LUNG     FINAL DIAGNOSIS:   Lung, right middle lobe, biopsy:    Benign pulmonary alveolar tissue.    Negative for malignancy.     See microscopic. EKG   Normal sinus rhythm  Right bundle branch block  Abnormal ECG  No previous ECGs available  Echocardiogram   2/19/20      Radiology    CXR   7/22/2021   XR CHEST (2 VW)     1. No definite evidence for right sided pneumothorax. 2. Abnormal density in the right upper lobe which could represent atelectasis versus infiltrate. 3. 2.3 x 1.4 cm nodule in the right upper lobe. Calcified granuloma in the right apex. 4. Prominence of the pulmonary arteries which could represent pulmonary hypertension. 5. Atherosclerotic calcification in the aortic arch. 7/18/2021  Findings:   No pleural effusion. Heart size normal.   No acute fracture.            Impression   Impression:   There is increased groundglass alveolar opacity in the right heart margin    related to the medial segment of the right middle lobe. The lungs are    otherwise clear. The right middle lobe opacity is consistent with early acute middle lobe    alveolar pulmonary infection. 7/19/2021   CTA THORAX / PULMONARY EMBOLUS STUDY:   1. No pulmonary emboli are seen. Questionable pulmonary arterial hypertension. 2. Subtotal collapse of the right middle lobe which could be due to mucous plug but cannot exclude other endobronchial pathology. No distinct mass lesion is seen, however. CT Scans 10/22/2019  CT Lung screening. Negative for acute changes and pulmonary nodules. Assessment   Acute on chronic hypoxic and hypercapnic respiratory failure secondary to COPD exacerbation.  -Positive for - SOB worsening, on BiPAP;ABG- PCO2 91, pH-7.34, HCO3 49  - keep BiPAP , monitoring O2sat,  -Continue antibiotics, steroids, bronchodilators.   -Pt is informed about smoking cessation.  -Bronchoscopy done 7/21/21      Community acquired pneumonia  -Positive for SOB, malaisia, leukocytosis, CXR infiltrate on right middle lobe  - MRSA and  VRE PCRs negative  -Blood cultures x2, pending    COPD exacerbation. GOLD 4 stage  -after discharge PFTs  -Home BiPAP eval    DM type 2     HTN      Plan   -BiPAP 26/10 cm H2O- to be used with daytime napping and at bedtime   -Home BiPAP evaluation for Severe COPD  -PFT reviewed   -ECHO reviewed. -Continue Brovana, Pulmicort, and Duonebs QID- Metaneb  -VTE prophylaxis: Lovenox/SCDs  -ATB per primary  -Acapella/IS discussed and encouraged use   -Pneumonia panel (+) Parainfluenza   -Sleep study reviewed from 2018  -CXR reviewed no residual PTX  -CXR 2-view in AM  -Home O2 evaluation prior to DC home   -Will keep appt with Gary Gar CNP on 9/1/21 at 3:00 PM  -Surgical pathology reviewed (-) malignant cells     Plan of car discussed with patient and primary RN  Meds and orders reviewed  Questions and concerns addressed. Electronically signed by   SEVERIANO Hicks CNP on 7/22/2021 at 10:59 AM     Addendum by Dr. Nile Garcia MD:  I have seen and examined the patient independently. Face to face evaluation and examination was performed. The above evaluation and note has been reviewed. Labs and radiographs were reviewed. I Have discussed with Ms. IVAN Hicks CNP about this patient in detail. The above assessment and plan has been reviewed. Please see my modifications mentioned below. My modifications:  Improving shortness of breath  On 4 L of oxygen via nasal cannula  For nocturnal pulse oximetry study today to qualify patient for home BiPAP. The nocturnal pulse oximetry is going to be done on 4 L of oxygen on nasal cannula. We will follow chest x-ray PA and lateral views tomorrow morning        Plan: We will follow chest x-ray PA and lateral views tomorrow morning.     Sue Sanon MD 7/22/2021 7:46 PM

## 2021-07-22 NOTE — FLOWSHEET NOTE
Pt was in bed as her family was visiting. She was dealing with respiratory failure. She was not giving up. Prayer was appreciated. 07/22/21 1641   Encounter Summary   Services provided to: Patient and family together   Referral/Consult From: [address] [place] [address] Road Visiting Yes  (7/22)   Complexity of Encounter Moderate   Length of Encounter 15 minutes   Spiritual/Yarsanism   Type Spiritual support   Assessment Approachable;Calm   Intervention Nurtured hope;Prayer; Active listening;Empowerment;Sustaining presence/ Ministry of presence   Outcome Connection/belonging;Expressed gratitude;Encouraged;Receptive; Hopeful

## 2021-07-22 NOTE — PROGRESS NOTES
Hospitalist Progress Note    Patient:  [facility]      Unit/Bed:4K-13/013-A    YOB: 1955    MRN: [id number]       Acct: [de-identified]     PCP: SUSAN Elliott    Date of Admission: 2021       Assessment/Plan:    Acute on chronic respiratory failure [facility] hypoxia/hypercapnia 2/2 COPD exacerbation, GOLD 4 with FEV1 20%, Active.: On BiPaP /10, 0.45 FiO2 -  CXR showed pulmonary congestion and right middle lobe infiltrate with mild flattening of diaphgragm. Pulmonary consulted. CTA shows no pulmonary emboli seen. There is questionable pulmonary arterial hypertension. There is subtotal collapse of the right middle lobe which could be due to mucous plug. Cannot exclude other endobronchial pathology. No distinct lesion seen. Patient was examined by pulmonology and had bronchoscopy yesterday on . Bronchoscopy showed thick light yellow-colored mucous plug noted in the right middle lobe intermedius bronchus. This was suctioned out.       -  AB.40, pCO2 81, pO2 69, HCO3 50, BE 21.1. Sat 92% FiO2 .5    -  AB.35; pCO2 85; pO2 55; HCO3 47, BE 17.6; Sat 84%. FiO2 0.4                - Pulmonology Following.              - Continue NC @6LPM.   - Continue Brovana, Pulmicort, Duoneb, Prednisone, roflumilast as prescribed. - Home BiPAP eval. Will need follow up PFT's as OP.    - IS as tolerated encouraged.     Sepsis (POA) 2/2 Community acquired pneumonia, Active.: SIRS 3/4 Positive present on admission with likely source of infection. Presenting vital signs were blood pressure 181/74, temperature 97, respiratory rate 27, 92 bpm.  WBCs 15.8. Initial lactic acid was 1.4. Portable chest x-ray demonstrates increased groundglass alveolar opacity in the right heart margin related to the medial segment of the right middle lobe. Opacities consistent with early acute middle lobe alveolar pulmonary infection. VRE negative. Sputum production that is yellow and tick.   WBC trending down to 13.8 (7/19). CRP elevated at 2.54. Sed rate elevated at 68. Potential for inflammatory etiology. CTA showed no pulmonary emboli. Subtotal collapse of the right middle lobe. To receive bronchoscopy tomorrow via pulmonology at 1530.               - Continue  Zithromax 500 QD x3 doses, Ceftriaxone 1g Q24h              - Pending Pneumonia panel, MRSA, COVID and Flu A/B              - Daily CBC, BMP              - Blood cultures x2 pending show NG currently. Symptomatic primary HTN, Resolved: Noted to be hypertensive on admission with complaints of headache. Continued on lisinopril-hydrochlorothiazide and metoprolol. Hold parameters in place.              - Continue to monitor vitals     Noninsulin-dependent diabetes mellitus type 2, Chronic/Stable: Hyperglycemic upon presentation with most recent  POC glucose at 198. Likely confounded by concomitant corticosteroid use. - Continue to hold metformin              - Continue low dose SSI with hypoglycemia protocol     Heart failure with preserved ejection fraction,  EF 60-65%, Chronic: noted. Continue Lasix. Urinary Tract Infection, Active: Incidental finding on urine culture. Patient has no signs or symptoms of urinary tract infection including no dysuria. Magana D/c'd. External catheter in place but patient is using restroom to void without problems. Continue to monitor.        Hx of HLD, Stable: Noted. Continue Pravachol        Expected discharge date:  TBD    Disposition:    [x] Home       [] TCU       [] Rehab       [] Psych       [] SNF       [] [place]       [] Other-    Chief Complaint: Progressive SOB    Hospital Course: Eloise Opitz is a 73 y.o. female with a PMHx of severe COPD, HTN, non-insulin-dependent diabetes mellitus type 2, heart failure with preserved ejection fraction, and HLD who presents to [facility] with chief complaint of shortness of breath on BiPAP.   She was a transfer from [facility] inpatient unit for potential bronchoscopy due to mucous plugging. Patient was intubated and extubated on 7/13/21 after being treated for 1 week of COPD exacerbation. Patient complains of shortness of breath, wheezing currently. Was placed on BiPAP while here initially at 26/10 0.5 FiO2. Currently on 24/8 with 0.5 FiO2. Still has increased work of breathing. Repeat ABG's show compensation and continued reducing CO2. Cleared for BiPAP use with daytime napping and continuous at bedtime. Home BiPAP evaluation. Patient to follow-up with pulmonology upon discharge for repeat PFTs. Pulmonology is following patient. They discussed Acapella and encouraged its use. Continue to monitor patient with intent to try to wean off BiPAP based on tolerance. MRSA screening was negative COVID-19 not detected rapid flu a and B- blood culture showed no growth x2, VRE was negative. Urine Cx showed E. Faecalis. Magana was D/c'd with external catheter in place. No symptoms of UTI. Subjective (past 24 hours): Patient was currently sitting in chair awake on nasal cannula at 6 L/min. Patient states that she tries not to move around too much in order so she can breathe easier. She tolerated the bronchoscopy well. Informed patient about the mucous plug found in the right middle lobe and that it was suctioned out. Told patient plan that we are currently waiting for her to become more stable with her breathing and wean down to her home level before we consider discharge. Patient understands and is in agreement. Today she states she still has some shortness of breath however does seem improved. Denies chest pain, nausea, vomiting, diarrhea, constipation, headache, fevers, chills, dysuria. Talked with pulmonology regarding patient and recommended incentive spirometry as well as home BiPAP evaluation. Continue to monitor    ROS (12 point review of systems completed. Pertinent positives noted. Otherwise ROS is negative). Medications:  Reviewed    Infusion Medications    sodium chloride 25 mL (07/21/21 0230)    dextrose       Scheduled Medications    Arformoterol Tartrate  15 mcg Nebulization BID    budesonide  0.5 mg Nebulization BID    lidocaine  1 patch Transdermal Daily    pantoprazole  40 mg Oral QAM AC    sodium chloride flush  5-40 mL Intravenous 2 times per day    enoxaparin  40 mg Subcutaneous Daily    cefTRIAXone (ROCEPHIN) IV  1,000 mg Intravenous Q24H    pravastatin  10 mg Oral Daily    insulin lispro  0-6 Units Subcutaneous TID WC    insulin lispro  0-3 Units Subcutaneous Nightly    aspirin  81 mg Oral Daily    furosemide  20 mg Oral Daily    metoprolol tartrate  25 mg Oral BID    Roflumilast  500 mcg Oral Daily    lisinopril  20 mg Oral Daily    And    hydroCHLOROthiazide  25 mg Oral Daily    ipratropium-albuterol  1 ampule Inhalation 4x daily     PRN Meds: traMADol, ibuprofen, acetaminophen, sodium chloride flush, sodium chloride, ipratropium-albuterol, glucose, dextrose, glucagon (rDNA), dextrose      Intake/Output Summary (Last 24 hours) at 7/22/2021 1416  Last data filed at 7/22/2021 1033  Gross per 24 hour   Intake 300 ml   Output 950 ml   Net -650 ml       Diet:  ADULT DIET; Regular; 4 carb choices (60 gm/meal)    Exam:  /60   Pulse 107   Temp 97.9 °F (36.6 °C) (Oral)   Resp 22   Ht 5' 7.5\" (1.715 m)   Wt 227 lb 3.2 oz (103.1 kg)   SpO2 92%   BMI 35.06 kg/m²     General appearance: Sitting in bed accompanied by family. .  Acutely ill appearing however does appear improved clinically. Currently on nasal cannula at 6 L/min. Appears stated age and cooperative. HEENT: Normocephalic, atraumatic. PERRLA. EOMI. conjunctivae/corneas clear. Neck: Supple, with full range of motion. No jugular venous distention. Trachea midline. Respiratory:  Increased respiratory effort. Diminished breathsounds noted on b/l lower lung fields.  Expiratory wheezes present diffusely. Currently on nasal cannula 6 L/min. Cardiovascular: Regular rate and rhythm with normal S1/S2 without murmurs, rubs or gallops. Abdomen: Soft, mild tenderness noted, non-distended with normal bowel sounds. Musculoskeletal: passive and active ROM x 4 extremities. Bandage on back noted. Skin: Skin color, texture, turgor normal.  No rashes or lesions. Neurologic:  Neurovascularly intact without any focal sensory/motor deficits. Cranial nerves: II-XII intact, grossly non-focal.  Psychiatric: Alert and oriented, thought content appropriate, normal insight  Capillary Refill: Brisk,< 3 seconds   Peripheral Pulses: +2 palpable, equal bilaterally       Labs:   Recent Labs     07/20/21  0534 07/21/21  0557   WBC 15.5* 13.3*   HGB 11.6* 10.6*   HCT 39.0 34.9*    231     Recent Labs     07/20/21  0534 07/21/21  0921    144   K 4.1 4.1   CL 94* 93*   CO2 40* 46*   BUN 31* 25*   CREATININE 0.7 0.8   CALCIUM 9.4 9.8     Recent Labs     07/20/21  0534   AST 20   ALT 94*   BILIDIR <0.2   BILITOT 0.3   ALKPHOS 76     No results for input(s): INR in the last 72 hours. No results for input(s): Poly Comment in the last 72 hours. Microbiology:      Urinalysis:    No results found for: Ceola Beery, BACTERIA, RBCUA, BLOODU, SPECGRAV, Jaky [address] 994    Radiology:  XR CHEST (2 VW)   Final Result   1. No definite evidence for right sided pneumothorax. 2. Abnormal density in the right upper lobe which could represent atelectasis versus infiltrate. 3. 2.3 x 1.4 cm nodule in the right upper lobe. Calcified granuloma in the right apex. 4. Prominence of the pulmonary arteries which could represent pulmonary hypertension. 5. Atherosclerotic calcification in the aortic arch. **This report has been created using voice recognition software. It may contain minor errors which are inherent in voice recognition technology. **      Final report electronically signed by DR Himanshu Wayne on 7/22/2021 8:23 AM XR CHEST PORTABLE   Final Result   1. Normal heart size. No effusion seen. 2. Persistent findings of mild atelectasis/pneumonia versus postbiopsy changes in the right middle lobe, improved from earlier. Questionable mild atelectasis/pneumonia left lung base. 3. Suggestion of a persistent small less than 5% pneumothorax right apex versus overlying skin fold. 4. Follow-up chest x-ray recommended tomorrow morning. **This report has been created using voice recognition software. It may contain minor errors which are inherent in voice recognition technology. **      Final report electronically signed by Dr. Gabriel Newton on 7/21/2021 6:33 PM      XR CHEST PORTABLE   Final Result   1. Normal heart size. Moderate infiltrate in the right middle lobe following recent bronchoscopy, consistent with atelectasis/pneumonia versus post biopsy bleeding or retained fluid resulting from bronchial washings. 2. Tiny less than 5% pneumothorax right apex. 3. Small benign-appearing nodule in the right and left apical region, likely poorly calcified granulomas. **This report has been created using voice recognition software. It may contain minor errors which are inherent in voice recognition technology. **      Final report electronically signed by Dr. Gabriel Newton on 7/21/2021 1:50 PM      CTA CHEST W WO CONTRAST   Final Result   1. No pulmonary emboli are seen. Questionable pulmonary arterial hypertension. 2. Subtotal collapse of the right middle lobe which could be due to mucous plug but cannot exclude other endobronchial pathology. No distinct mass lesion is seen, however. **This report has been created using voice recognition software. It may contain minor errors which are inherent in voice recognition technology. **          Final report electronically signed by Dr. Gabriel Newton on 7/19/2021 10:04 PM      XR CHEST PORTABLE   Final Result   Impression:   There is increased groundglass alveolar opacity in the right heart margin    related to the medial segment of the right middle lobe. The lungs are    otherwise clear. The right middle lobe opacity is consistent with early acute middle lobe    alveolar pulmonary infection.       This document has been electronically signed by: Karina Santana MD on    07/18/2021 04:26 AM      XR CHEST (2 VW)    (Results Pending)       DVT prophylaxis: [x] Lovenox                                 [] SCDs                                 [] SQ Heparin                                 [] Encourage ambulation           [] Already on Anticoagulation     Code Status: Full Code    PT/OT Eval Status: Ordered    Tele:   [x] yes             [] no    Electronically signed by Nanci Rodrigues DO on 7/22/2021 at 2:16 PM

## 2021-07-22 NOTE — PROGRESS NOTES
[facility] ICU STEPDOWN TELEMETRY 4K  Occupational Therapy  Daily Note  Time:    Time In: 5504  Time Out: 1408  Timed Code Treatment Minutes: 27 Minutes  Minutes: 27          Date: 2021  Patient Name: Alberto King,   Gender: female      Room: [facility]13/013-A  MRN: [id number]  : 1955  (73 y.o.)  Referring Practitioner: Trell Garcia DO  Diagnosis: Respiratory failure  Additional Pertinent Hx: Per H&P, Kelly Armstrong is a 73 y.o. female,  PMHx off COPD, CHF,  DM type 2, HTN, HLD, is admitted to the hospital yesterday  for  shortness of breath and possible bronchocopy. Patient transferred from [facility] inpatient unit for bronchoscopy for mucous plug. Patient was being treated for COPD exacerbation for a week at [facility]; intubated and extubated on 21. She admits sputum production described as yellow and thick. Additional symptoms are headache, fatigue, abdominal pain. Denies blood in sputum. Reports associated headache. Denies chest pain, abdominal pain, dysuria, chills, fever, dizziness, lightheadedness. \"    Restrictions/Precautions:  Restrictions/Precautions: General Precautions, Fall Risk  Position Activity Restriction  Other position/activity restrictions: on 5L O2         SUBJECTIVE: Pt lying in bed requesting to use bathroom. RT present as well agreeable to allow OT. PAIN: did not state/10: back pain with sitting EOB     Vitals: Oxygen: Pt on 5L of O2 during movement with 8L during breathing treatment when returned to bed. While sitting on BSC just prior to returning to bed, pts O2 sats at 80%. RT present in room. Pt rqeuiring several min of sitting up in bed with breathing treatment in place for recovery back into the 90s    COGNITION: WNL    ADL:   Toileting: Maximum Assistance. for hygiene  Toilet Transfer: [address] from [facility]. BALANCE:  Sitting Balance:  Stand By Assistance.  at EOB  Standing Balance: Contact Guard Assistance. during hygiene with 1 UE support on arm rest    BED MOBILITY:  Supine to Sit: Stand By Assistance    Sit to Supine: Stand By Assistance      TRANSFERS:  Sit to Stand:  [address] from EOB with cues for hand palcement d/t pt wnating ot reach for arm rst of C  Stand to Sit: Contact Guard Assistance. onto EOB     FUNCTIONAL MOBILITY:  Assistive Device: none  Assist Level:  Minimal Assistance. Distance: x 2 feet to BSC  Pt unsteady during transfers requiring cues for safety      ADDITIONAL ACTIVITIES:  Pt educated on breathing tech throughout with pt verbalizing understanding. Pt educated on the need to complete AROM ex while in bed aas well with demo given to increase endurance for ADL asks. ASSESSMENT:     Activity Tolerance:  Patient tolerance of  treatment: fair. Increased SOB throught requiring lengthy rst break after complete transfer and mobility. Discharge Recommendations: Continue to assess pending progress, Subacute/Skilled Nursing Facility    Equipment Recommendations: Equipment Needed: No  Other: Monitor needs pending progress. Plan: Times per week: 5x  Current Treatment Recommendations: ROM, Functional Mobility Training, Endurance Training, Balance Training, Safety Education & Training, Self-Care / ADL, Patient/Caregiver Education & Training, Home Management Training, Equipment Evaluation, Education, & procurement    Patient Education  Patient Education: breathing tech and safety wit ADL tasks    Goals  Short term goals  Time Frame for Short term goals: Until discharge  Short term goal 1: Pt will complete BUE AROM exercises with min vcs for technique to increase indep and endurance with all self cares. Short term goal 2: Pt will complete standing tolerance x 2 minutes with S while O2 stats remain above 90% to increase indep with toileting.   Short term goal 3: Pt will complete BADL routine with S while demonstrating EC techniques to increase endurance within home environment. Short term goal 4: Pt will complete short distances with S while O2 stats remain above 90% to increase indep and endurance within home environment. Following session, patient left in safe position with all fall risk precautions in place.

## 2021-07-22 NOTE — PROGRESS NOTES
The ordering provider is trying to qualify the patient for a Home Respiratory Assist Device. Patient Diagnosis: Must have documentation in the patients medical record     [x]  Severe COPD   [] Progressive Neromuscular Disease  [] Severe Thoracic Cage Abnormality  []  Central Sleep Apnea or Complex Sleep Apnea  []  Hypoventilation Syndrome    The following test(s) have been completed to justify the need for Home Respiratory Assist Device. Required testing was based on Home Respiratory Assist Device policy and procedure. [x]  ABG while awake, breathing the patientss prescribed FiO2. Lab Results   Component Value Date    PH 7.35 07/22/2021    PO2 55 07/22/2021    PCO2 85 07/22/2021    HCO3 47 07/22/2021    O2SAT 84 07/22/2021     [x] Overnight oximetry while the patient is breathing oxygen at 2 lpm or   the patients prescribed FiO2, whichever is higher  [] Maximal inspiratory pressure   [] Forced Vital capacity  [] Sleep study at a sleep clinic    [x] Spirometry FEV1/FVC    The ordering provider to determine if patient qualifies based on the result of the required testing. The following table describes the necessary criteria for home coverage for Home Respiratory Assist Device. Diagnosis Blood gas  Overnight oximetry  MIP or FVC  Other   COPD PaCO2  > 52 mm Hg W/A & < 88% for at least 5 cumulative minutes. N/A & DENISHA and treatment with CPAP has been ruled out   Neuromuscular Abnormality PaCO2 > 45 mm hg W/A Or < 88% for at least 5 cumulative minutes. Or MIP < 60 cm H2O or FVC < 50% pred. & COPD does not contribute significantly to patients pulmonary limitation   Severe Thoracic Cage Abnormality PaCO2 > 45 mm hg W/A Or < 88% for at least 5 cumulative minutes.   N/A & COPD does not contribute significantly to patients pulmonary limitation   Central Sleep Apnea N/A  N/A  N/A  Sleep study at a sleep clinic   Hypoventilation Syndrome PaCO2 > 45 mmHg W/A   N/A & FEV1/  FVC > 70% & Sleep study at a sleep clinic  Or  PaCO2 worsening > 7 mmHg when sleeping

## 2021-07-22 NOTE — CARE COORDINATION
7/22/21, 11:49 AM EDT    DISCHARGE PLANNING EVALUATION      ROSCOE faxed PT note and updates to [employer] at [facility] and asked for precert to be started. Update:  ROSCOE called and spoke with Amber Coreas at [facility]. She reported that she will have Krystal call ROSCOE back. ROSCOE explained that ROSCOE just wanted to ensure they received information on patient and are able to start the precert.

## 2021-07-22 NOTE — CARE COORDINATION
7/22/21, 12:05 PM EDT    DISCHARGE ON GOING [address] day: 4  Location: [facility]13/013-A Reason for admit: Respiratory failure [facility]) [J96.90]   Procedure:   7/18 CXR  There is increased groundglass alveolar opacity in the right heart margin   related to the medial segment of the right middle lobe.  The lungs are   otherwise clear. The right middle lobe opacity is consistent with early acute middle lobe   alveolar pulmonary infection. 7/19 CT Chest  Subtotal collapse of the right middle lobe which could be due to mucous plug  7/21 Bronchoscopy; mucus plug removed  7/22 CXR  1. No definite evidence for right sided pneumothorax. 2. Abnormal density in the right upper lobe which could represent atelectasis versus infiltrate. 3. 2.3 x 1.4 cm nodule in the right upper lobe. Calcified granuloma in the right apex. 4. Prominence of the pulmonary arteries which could represent pulmonary hypertension. 5. Atherosclerotic calcification in the aortic arch. Barriers to Discharge: From [facility] Respiratory Failure w history COPD, CHF. CO2 85, GLUC 306, WBC 13.3, (+) Urine Culture; Enterococcus Faecalis, (+) Pneumonia Panel (BAL): Parainfluenza Virus; monitor. Pulmonary following. 40% BIPAP versus 6L oxygen, IV AB continued.  Pulmonary plans CXR in am  PCP: SUSAN Stokes  Readmission Risk Score: 10%     Patient Goals/Plan/Treatment Preferences: lives w spouse and son; [facility] (precert day [address] for rehab needs w new BIPAP 26/10 has [employer] ([place]) home oxygen 4L, has nebulizer, walker; therapy following; collaborated w ROSCOE Hwang

## 2021-07-23 ENCOUNTER — APPOINTMENT (OUTPATIENT)
Dept: GENERAL RADIOLOGY | Age: 66
DRG: 871 | End: 2021-07-23
Attending: INTERNAL MEDICINE
Payer: COMMERCIAL

## 2021-07-23 PROBLEM — J96.91 RESPIRATORY FAILURE WITH HYPOXIA AND HYPERCAPNIA (HCC): Status: ACTIVE | Noted: 2021-07-17

## 2021-07-23 PROBLEM — J96.92 RESPIRATORY FAILURE WITH HYPOXIA AND HYPERCAPNIA (HCC): Status: ACTIVE | Noted: 2021-07-17

## 2021-07-23 LAB
ALLEN TEST: POSITIVE
ANION GAP SERPL CALCULATED.3IONS-SCNC: 6 MEQ/L (ref 8–16)
BASE EXCESS (CALCULATED): 17.4 MMOL/L (ref -2.5–2.5)
BLOOD CULTURE, ROUTINE: NORMAL
BUN BLDV-MCNC: 21 MG/DL (ref 7–22)
CALCIUM SERPL-MCNC: 9.3 MG/DL (ref 8.5–10.5)
CHLORIDE BLD-SCNC: 90 MEQ/L (ref 98–111)
CO2: 42 MEQ/L (ref 23–33)
COLLECTED BY:: ABNORMAL
CREAT SERPL-MCNC: 0.7 MG/DL (ref 0.4–1.2)
DEVICE: ABNORMAL
ERYTHROCYTE [DISTWIDTH] IN BLOOD BY AUTOMATED COUNT: 14.4 % (ref 11.5–14.5)
ERYTHROCYTE [DISTWIDTH] IN BLOOD BY AUTOMATED COUNT: 55.8 FL (ref 35–45)
GFR SERPL CREATININE-BSD FRML MDRD: 84 ML/MIN/1.73M2
GLUCOSE BLD-MCNC: 133 MG/DL (ref 70–108)
GLUCOSE BLD-MCNC: 159 MG/DL (ref 70–108)
GLUCOSE BLD-MCNC: 162 MG/DL (ref 70–108)
GLUCOSE BLD-MCNC: 173 MG/DL (ref 70–108)
GLUCOSE BLD-MCNC: 187 MG/DL (ref 70–108)
HCO3: 46 MMOL/L (ref 23–28)
HCT VFR BLD CALC: 34 % (ref 37–47)
HEMOGLOBIN: 10.2 GM/DL (ref 12–16)
IFIO2: 4
MCH RBC QN AUTO: 31.3 PG (ref 26–33)
MCHC RBC AUTO-ENTMCNC: 30 GM/DL (ref 32.2–35.5)
MCV RBC AUTO: 104.3 FL (ref 81–99)
O2 SATURATION: 90 %
PCO2: 80 MMHG (ref 35–45)
PH BLOOD GAS: 7.37 (ref 7.35–7.45)
PLATELET # BLD: 213 THOU/MM3 (ref 130–400)
PMV BLD AUTO: 11.9 FL (ref 9.4–12.4)
PO2: 65 MMHG (ref 71–104)
POTASSIUM SERPL-SCNC: 3.8 MEQ/L (ref 3.5–5.2)
RBC # BLD: 3.26 MILL/MM3 (ref 4.2–5.4)
REASON FOR REJECTION: NORMAL
REASON FOR REJECTION: NORMAL
REJECTED TEST: NORMAL
REJECTED TEST: NORMAL
SODIUM BLD-SCNC: 138 MEQ/L (ref 135–145)
SOURCE, BLOOD GAS: ABNORMAL
WBC # BLD: 12.4 THOU/MM3 (ref 4.8–10.8)

## 2021-07-23 PROCEDURE — 36415 COLL VENOUS BLD VENIPUNCTURE: CPT

## 2021-07-23 PROCEDURE — 99232 SBSQ HOSP IP/OBS MODERATE 35: CPT | Performed by: INTERNAL MEDICINE

## 2021-07-23 PROCEDURE — 6360000002 HC RX W HCPCS: Performed by: NURSE PRACTITIONER

## 2021-07-23 PROCEDURE — 80048 BASIC METABOLIC PNL TOTAL CA: CPT

## 2021-07-23 PROCEDURE — 2060000000 HC ICU INTERMEDIATE R&B

## 2021-07-23 PROCEDURE — 85027 COMPLETE CBC AUTOMATED: CPT

## 2021-07-23 PROCEDURE — 6370000000 HC RX 637 (ALT 250 FOR IP): Performed by: INTERNAL MEDICINE

## 2021-07-23 PROCEDURE — 97110 THERAPEUTIC EXERCISES: CPT

## 2021-07-23 PROCEDURE — 6370000000 HC RX 637 (ALT 250 FOR IP): Performed by: STUDENT IN AN ORGANIZED HEALTH CARE EDUCATION/TRAINING PROGRAM

## 2021-07-23 PROCEDURE — 2580000003 HC RX 258: Performed by: STUDENT IN AN ORGANIZED HEALTH CARE EDUCATION/TRAINING PROGRAM

## 2021-07-23 PROCEDURE — 2700000000 HC OXYGEN THERAPY PER DAY

## 2021-07-23 PROCEDURE — 82948 REAGENT STRIP/BLOOD GLUCOSE: CPT

## 2021-07-23 PROCEDURE — 99233 SBSQ HOSP IP/OBS HIGH 50: CPT | Performed by: INTERNAL MEDICINE

## 2021-07-23 PROCEDURE — 82803 BLOOD GASES ANY COMBINATION: CPT

## 2021-07-23 PROCEDURE — APPSS30 APP SPLIT SHARED TIME 16-30 MINUTES: Performed by: NURSE PRACTITIONER

## 2021-07-23 PROCEDURE — 94640 AIRWAY INHALATION TREATMENT: CPT

## 2021-07-23 PROCEDURE — 71046 X-RAY EXAM CHEST 2 VIEWS: CPT

## 2021-07-23 PROCEDURE — 6360000002 HC RX W HCPCS: Performed by: STUDENT IN AN ORGANIZED HEALTH CARE EDUCATION/TRAINING PROGRAM

## 2021-07-23 PROCEDURE — 97535 SELF CARE MNGMENT TRAINING: CPT

## 2021-07-23 PROCEDURE — 36600 WITHDRAWAL OF ARTERIAL BLOOD: CPT

## 2021-07-23 PROCEDURE — 94760 N-INVAS EAR/PLS OXIMETRY 1: CPT

## 2021-07-23 PROCEDURE — 94669 MECHANICAL CHEST WALL OSCILL: CPT

## 2021-07-23 RX ADMIN — PANTOPRAZOLE SODIUM 40 MG: 40 TABLET, DELAYED RELEASE ORAL at 05:37

## 2021-07-23 RX ADMIN — METFORMIN HYDROCHLORIDE 500 MG: 500 TABLET ORAL at 07:58

## 2021-07-23 RX ADMIN — TRAMADOL HYDROCHLORIDE 50 MG: 50 TABLET ORAL at 23:21

## 2021-07-23 RX ADMIN — ARFORMOTEROL TARTRATE 15 MCG: 15 SOLUTION RESPIRATORY (INHALATION) at 19:53

## 2021-07-23 RX ADMIN — ROFLUMILAST 500 MCG: 500 TABLET ORAL at 07:58

## 2021-07-23 RX ADMIN — ALOGLIPTIN 12.5 MG: 12.5 TABLET, FILM COATED ORAL at 07:58

## 2021-07-23 RX ADMIN — TRAMADOL HYDROCHLORIDE 50 MG: 50 TABLET ORAL at 02:12

## 2021-07-23 RX ADMIN — SODIUM CHLORIDE, PRESERVATIVE FREE 10 ML: 5 INJECTION INTRAVENOUS at 07:57

## 2021-07-23 RX ADMIN — TRAMADOL HYDROCHLORIDE 50 MG: 50 TABLET ORAL at 15:38

## 2021-07-23 RX ADMIN — INSULIN LISPRO 2 UNITS: 100 INJECTION, SOLUTION INTRAVENOUS; SUBCUTANEOUS at 12:47

## 2021-07-23 RX ADMIN — METOPROLOL TARTRATE 25 MG: 25 TABLET, FILM COATED ORAL at 07:58

## 2021-07-23 RX ADMIN — HYDROCHLOROTHIAZIDE 25 MG: 25 TABLET ORAL at 07:58

## 2021-07-23 RX ADMIN — INSULIN LISPRO 2 UNITS: 100 INJECTION, SOLUTION INTRAVENOUS; SUBCUTANEOUS at 17:34

## 2021-07-23 RX ADMIN — IBUPROFEN 400 MG: 400 TABLET, FILM COATED ORAL at 07:03

## 2021-07-23 RX ADMIN — BUDESONIDE 500 MCG: 0.5 INHALANT RESPIRATORY (INHALATION) at 09:06

## 2021-07-23 RX ADMIN — IPRATROPIUM BROMIDE AND ALBUTEROL SULFATE 1 AMPULE: .5; 3 SOLUTION RESPIRATORY (INHALATION) at 12:41

## 2021-07-23 RX ADMIN — IPRATROPIUM BROMIDE AND ALBUTEROL SULFATE 1 AMPULE: .5; 3 SOLUTION RESPIRATORY (INHALATION) at 16:17

## 2021-07-23 RX ADMIN — IPRATROPIUM BROMIDE AND ALBUTEROL SULFATE 1 AMPULE: .5; 3 SOLUTION RESPIRATORY (INHALATION) at 19:53

## 2021-07-23 RX ADMIN — PRAVASTATIN SODIUM 10 MG: 10 TABLET ORAL at 07:58

## 2021-07-23 RX ADMIN — IBUPROFEN 400 MG: 400 TABLET, FILM COATED ORAL at 19:57

## 2021-07-23 RX ADMIN — LISINOPRIL 20 MG: 20 TABLET ORAL at 07:58

## 2021-07-23 RX ADMIN — METFORMIN HYDROCHLORIDE 500 MG: 500 TABLET ORAL at 17:34

## 2021-07-23 RX ADMIN — SODIUM CHLORIDE, PRESERVATIVE FREE 10 ML: 5 INJECTION INTRAVENOUS at 19:58

## 2021-07-23 RX ADMIN — FUROSEMIDE 20 MG: 20 TABLET ORAL at 07:57

## 2021-07-23 RX ADMIN — TRAMADOL HYDROCHLORIDE 50 MG: 50 TABLET ORAL at 07:58

## 2021-07-23 RX ADMIN — ARFORMOTEROL TARTRATE 15 MCG: 15 SOLUTION RESPIRATORY (INHALATION) at 09:06

## 2021-07-23 RX ADMIN — ENOXAPARIN SODIUM 40 MG: 40 INJECTION, SOLUTION INTRAVENOUS; SUBCUTANEOUS at 07:58

## 2021-07-23 RX ADMIN — BUDESONIDE 500 MCG: 0.5 INHALANT RESPIRATORY (INHALATION) at 19:53

## 2021-07-23 RX ADMIN — ASPIRIN 81 MG: 81 TABLET, CHEWABLE ORAL at 07:58

## 2021-07-23 RX ADMIN — IPRATROPIUM BROMIDE AND ALBUTEROL SULFATE 1 AMPULE: .5; 3 SOLUTION RESPIRATORY (INHALATION) at 09:06

## 2021-07-23 RX ADMIN — CEFTRIAXONE SODIUM 1000 MG: 1 INJECTION, POWDER, FOR SOLUTION INTRAMUSCULAR; INTRAVENOUS at 05:37

## 2021-07-23 ASSESSMENT — PAIN DESCRIPTION - LOCATION
LOCATION: BACK
LOCATION: BACK

## 2021-07-23 ASSESSMENT — PAIN SCALES - GENERAL
PAINLEVEL_OUTOF10: 4
PAINLEVEL_OUTOF10: 0
PAINLEVEL_OUTOF10: 5

## 2021-07-23 ASSESSMENT — PAIN DESCRIPTION - DESCRIPTORS: DESCRIPTORS: CONSTANT

## 2021-07-23 ASSESSMENT — PAIN DESCRIPTION - PROGRESSION: CLINICAL_PROGRESSION: NOT CHANGED

## 2021-07-23 ASSESSMENT — PAIN - FUNCTIONAL ASSESSMENT: PAIN_FUNCTIONAL_ASSESSMENT: ACTIVITIES ARE NOT PREVENTED

## 2021-07-23 ASSESSMENT — PAIN DESCRIPTION - ONSET: ONSET: ON-GOING

## 2021-07-23 ASSESSMENT — PAIN DESCRIPTION - FREQUENCY: FREQUENCY: CONTINUOUS

## 2021-07-23 ASSESSMENT — PAIN DESCRIPTION - PAIN TYPE
TYPE: CHRONIC PAIN
TYPE: CHRONIC PAIN

## 2021-07-23 ASSESSMENT — PAIN DESCRIPTION - ORIENTATION: ORIENTATION: MID

## 2021-07-23 NOTE — PROGRESS NOTES
[facility] ICU STEPDOWN TELEMETRY 4K  Occupational Therapy  Daily Note  Time:   Time In: 6593  Time Out: 7049  Timed Code Treatment Minutes: 26 Minutes  Minutes: 26          Date: 2021  Patient Name: Anson Ahumada,   Gender: female      Room: Count includes the [facility]13/013-A  MRN: [id number]  : 1955  (73 y.o.)  Referring Practitioner: Gretel Lang DO  Diagnosis: Respiratory failure  Additional Pertinent Hx: Per H&P, Bia Oropeza is a 73 y.o. female,  PMHx off COPD, CHF,  DM type 2, HTN, HLD, is admitted to the hospital yesterday  for  shortness of breath and possible bronchocopy. Patient transferred from [facility] inpatient unit for bronchoscopy for mucous plug. Patient was being treated for COPD exacerbation for a week at [facility]; intubated and extubated on 21. She admits sputum production described as yellow and thick. Additional symptoms are headache, fatigue, abdominal pain. Denies blood in sputum. Reports associated headache. Denies chest pain, abdominal pain, dysuria, chills, fever, dizziness, lightheadedness. \"    Restrictions/Precautions:  Restrictions/Precautions: General Precautions, Fall Risk  Position Activity Restriction  Other position/activity restrictions: on 5L O2      SUBJECTIVE: Pt seated in bedside chair upon arrival, agreeable to OT session. PAIN: No c/o. Vitals: Oxygen: Patient on 4L O2 via Nasal Canula  upon arrival to room. Patient O2 sats at >90 %. Upon activity patient dropping O2 at 85-87%- increased O2 to 5L- requiring lengthy rest break(s) with deep breathing to recover >90%. Heart Rate: WNL    COGNITION: Decreased Insight, Decreased Problem Solving and Decreased Safety Awareness    ADL:   Grooming: with set-up. Using wet wip washing hands while seated. Lower Extremity Dressing: Stand By Assistance. Donning Socks onto B feet within bedside chair with leg rest elevated. Toileting: Contact Guard Assistance.   CGA for goals  Time Frame for Short term goals: Until discharge  Short term goal 1: Pt will complete BUE AROM exercises with min vcs for technique to increase indep and endurance with all self cares. Short term goal 2: Pt will complete standing tolerance x 2 minutes with S while O2 stats remain above 90% to increase indep with toileting. Short term goal 3: Pt will complete BADL routine with S while demonstrating EC techniques to increase endurance within home environment. Short term goal 4: Pt will complete short distances with S while O2 stats remain above 90% to increase indep and endurance within home environment.     Following session, patient left in safe position with all fall risk precautions in place

## 2021-07-23 NOTE — CARE COORDINATION
7/23/21, 10:36 AM EDT    DISCHARGE PLANNING EVALUATION    SW spoke with patient about discharge planning. She reported that if she can get her bipap she would like to return home. She reported that she would like the precert for the facility to continue as she still may need rehab at discharge. Update:  ROSCOE called and spoke with [facility] and [facility] and left message with her to have Krystal in admissions to call ROSCOE back. Receive both Forms in [facility] for processing.

## 2021-07-23 NOTE — PLAN OF CARE
Problem: Impaired respiratory status  Goal: Clear lung sounds  7/23/2021 1112 by Celestine Turner RCP  Outcome: Ongoing  7/23/2021 0109 by Sindi Lee RN  Outcome: Ongoing

## 2021-07-23 NOTE — PROGRESS NOTES
Medical History      Diagnosis Date    CHF (congestive heart failure) (HCC)     COPD (chronic obstructive pulmonary disease) [facility] [address]     Diabetes mellitus ([facility] [address]     Diastolic dysfunction     Environmental and seasonal allergies     HLD (hyperlipidemia)     Hypertension       Past Surgical History        Procedure Laterality Date    APPENDECTOMY      BRONCHOSCOPY N/A 7/21/2021    BRONCHOSCOPY performed by Tabatha Thomas MD at [facility]    BRONCHOSCOPY  7/21/2021    BRONCHOSCOPY FLUOROSCOPY performed by Tabatha Thomas MD at [facility]    BRONCHOSCOPY  7/21/2021    BRONCHOSCOPY/TRANSBRONCHIAL LUNG BIOPSY performed by Tabatha Thomas MD at [address] [place] [address] Drive      FOOT SURGERY      HERNIA REPAIR       Meds    Current Medications    Arformoterol Tartrate  15 mcg Nebulization BID    budesonide  0.5 mg Nebulization BID    insulin regular  8 Units Intravenous Once    alogliptin  12.5 mg Oral Daily    insulin lispro  0-12 Units Subcutaneous TID WC    insulin lispro  0-6 Units Subcutaneous Nightly    metFORMIN  500 mg Oral BID WC    lidocaine  1 patch Transdermal Daily    pantoprazole  40 mg Oral QAM AC    sodium chloride flush  5-40 mL Intravenous 2 times per day    enoxaparin  40 mg Subcutaneous Daily    cefTRIAXone (ROCEPHIN) IV  1,000 mg Intravenous Q24H    pravastatin  10 mg Oral Daily    aspirin  81 mg Oral Daily    furosemide  20 mg Oral Daily    metoprolol tartrate  25 mg Oral BID    Roflumilast  500 mcg Oral Daily    lisinopril  20 mg Oral Daily    And    hydroCHLOROthiazide  25 mg Oral Daily    ipratropium-albuterol  1 ampule Inhalation 4x daily     traMADol, ibuprofen, acetaminophen, sodium chloride flush, sodium chloride, ipratropium-albuterol, glucose, dextrose, glucagon (rDNA), dextrose  IV Drips/Infusions   sodium chloride 25 mL (07/21/21 0230)    dextrose       Home Medications  Medications Prior to Admission: ipratropium-albuterol (DUONEB) 0.5-2.5 (3) MG/3ML SOLN nebulizer solution, Inhale 3 mLs into the lungs every 4 hours as needed for Shortness of Breath  budesonide (PULMICORT) 0.5 MG/2ML nebulizer suspension, Take 2 mLs by nebulization 2 times daily  albuterol-ipratropium (COMBIVENT RESPIMAT)  MCG/ACT AERS inhaler, Inhale 1 puff into the lungs every 6 hours as needed for Wheezing or Shortness of Breath  albuterol sulfate  (90 Base) MCG/ACT inhaler, Inhale 2 puffs into the lungs every 4 hours as needed for Wheezing or Shortness of Breath  levocetirizine (XYZAL) 5 MG tablet, Take 1 tablet by mouth nightly  fluticasone (FLONASE) 50 MCG/ACT nasal spray, 2 sprays by Nasal route daily  Roflumilast (DALIRESP) 500 MCG tablet, Take 1 tablet by mouth daily  aspirin 81 MG tablet, Take 81 mg by mouth daily  metoprolol tartrate (LOPRESSOR) 25 MG tablet, Take 25 mg by mouth 2 times daily  lisinopril-hydrochlorothiazide (PRINZIDE;ZESTORETIC) 20-25 MG per tablet, Take 1 tablet by mouth daily  metFORMIN (GLUCOPHAGE) 500 MG tablet, Take 500 mg by mouth 2 times daily (with meals) 1,000 mg at supper. Cholecalciferol (VITAMIN D) 2000 units CAPS capsule, Take by mouth  Cyanocobalamin (B-12 PO), Take by mouth  pravastatin (PRAVACHOL) 10 MG tablet, Take 10 mg by mouth daily  Pseudoephedrine-Guaifenesin (MUCINEX D PO), Take by mouth as needed   Ibuprofen (ADVIL PO), Take by mouth as needed  FUROSEMIDE PO, Take 20 mg by mouth   Sodium Chloride-Sodium Bicarb (AYR SALINE NASAL RINSE) 1.57 g PACK, 1 packet by Nasal route 2 times daily as needed (sinus congestion) (Patient not taking: Reported on 5/26/2021)  Multiple Vitamins-Minerals (THERAPEUTIC MULTIVITAMIN-MINERALS) tablet, Take 1 tablet by mouth daily  Diet    ADULT DIET;  Regular; 4 carb choices (60 gm/meal)  Allergies    Excedrin extra strength [aspirin-acetaminophen-caffeine], Norco [hydrocodone-acetaminophen], and Tylenol [acetaminophen]  Social History     Social History Current smoker: No.       History of recreational or IV drug use in the past:NO     History of exposure to coal mines/coal dust: NO  History of exposure to foundry dust/welding: NO  History of exposure to quarry/silica/sandblasting: NO  History of exposure to asbestos/working with breaks/ships: NO  History of exposure to farm dust: NO  History of recent travel to long distances: NO  History of exposure to birds, pigeons, or chickens in the past:NO  History of pulmonary embolism in the past: No     denies       History of DVT in the past:No             Vitals     height is 5' 7.5\" (1.715 m) and weight is 227 lb 3.2 oz (103.1 kg). Her oral temperature is 98.1 °F (36.7 °C). Her blood pressure is 131/62 and her pulse is 97. Her respiration is 19 and oxygen saturation is 94%. Body mass index is 35.06 kg/m². SUPPLEMENTAL O2: O2 Flow Rate (L/min): 4 L/min (baseline)     I/O        Intake/Output Summary (Last 24 hours) at 7/23/2021 0839  Last data filed at 7/23/2021 0331  Gross per 24 hour   Intake 670 ml   Output 1100 ml   Net -430 ml     I/O last 3 completed shifts: In: 670 [P.O.:670]  Out: 1100 [Urine:1100]   Patient Vitals for the past 96 hrs (Last 3 readings):   Weight   07/22/21 0430 227 lb 3.2 oz (103.1 kg)   07/21/21 0530 228 lb 8 oz (103.6 kg)   07/20/21 0515 228 lb 1.6 oz (103.5 kg)       Exam   Constitutional: Patient appears in no distress on O2 per NC 4LPM. Anxious at times. Intermittent SOB  Mouth/Throat: Exam is limited by full facemask with the BiPAP  Neck: Neck supple. No JVD  Cardiovascular: NSR. S1 and S2 heard. No murmurs. Pulmonary/Chest: Bilateral air entry present. Good breath sounds on both sides, diminished at bases right more than left I. No wheezes. No rales. Abdominal: Soft. Obese. no tenderness. Extremities: Patient exhibits trace leg edema. Neurological: Patient is awake and alert.      Labs  - Old records and notes have been reviewed in [facility]   ABG  Lab Results   Component Value Date    PH 7.35 07/22/2021    PO2 55 07/22/2021    PCO2 85 07/22/2021    HCO3 47 07/22/2021    O2SAT 84 07/22/2021     Lab Results   Component Value Date    IFIO2 4 07/22/2021    MODE BiLevel 07/18/2021    SETPEEP 10.0 07/19/2021     CBC  Recent Labs     07/21/21  0557   WBC 13.3*   RBC 3.39*   HGB 10.6*   HCT 34.9*   .9*   MCH 31.3   MCHC 30.4*      MPV 12.1      BMP  Recent Labs     07/21/21  0921      K 4.1   CL 93*   CO2 46*   BUN 25*   CREATININE 0.8   GLUCOSE 188*   CALCIUM 9.8     LFT  No results for input(s): AST, ALT, ALB, BILITOT, ALKPHOS, LIPASE in the last 72 hours. Invalid input(s): AMYLASE  TROP  No results found for: TROPONINT  BNP  No results for input(s): BNP in the last 72 hours. Lactic Acid  No results for input(s): LACTA in the last 72 hours. INR  No results for input(s): INR, PROTIME in the last 72 hours. PTT  No results for input(s): APTT in the last 72 hours. Glucose  Recent Labs     07/22/21  1642 07/22/21  1947 07/23/21  0749   POCGLU 428* 312* 133*     UA No results for input(s): SPECGRAV, PHUR, COLORU, CLARITYU, MUCUS, PROTEINU, BLOODU, RBCUA, WBCUA, BACTERIA, NITRU, GLUCOSEU, BILIRUBINUR, UROBILINOGEN, KETUA, LABCAST, LABCASTTY, AMORPHOS in the last 72 hours. Invalid input(s): CRYSTALS. PFTs     11/4/2020        Sleep studies   Pt stated that she had sleep study a yr ago and never diagnosed with sleep apnea    Cultures    Urine culture: gram (+) cocci chains  VRE (-)  Blood culture: no growth; prelim  COVID-19, Rapid [[id number]] Collected: 07/19/21 1520   Order Status: Completed Specimen: Nasopharyngeal Swab Updated: 07/19/21 1540    SARS-CoV-2, NAAT NOT DETECTED    Comment: Rapid NAAT:   Negative results should be treated as presumptive and,   if inconsistent with clinical signs and symptoms or necessary for   patient management, should be tested with an alternative molecular   assay.  Negative results do not preclude SARS-CoV-2 infection and 4. Prominence of the pulmonary arteries which could represent pulmonary hypertension. 5. Atherosclerotic calcification in the aortic arch. 7/18/2021  Findings:   No pleural effusion. Heart size normal.   No acute fracture. Impression   Impression:   There is increased groundglass alveolar opacity in the right heart margin    related to the medial segment of the right middle lobe. The lungs are    otherwise clear. The right middle lobe opacity is consistent with early acute middle lobe    alveolar pulmonary infection. 7/19/2021   CTA THORAX / PULMONARY EMBOLUS STUDY:   1. No pulmonary emboli are seen. Questionable pulmonary arterial hypertension. 2. Subtotal collapse of the right middle lobe which could be due to mucous plug but cannot exclude other endobronchial pathology. No distinct mass lesion is seen, however. CT Scans 10/22/2019  CT Lung screening. Negative for acute changes and pulmonary nodules. Assessment   Acute on chronic hypoxic and hypercapnic respiratory failure secondary to COPD exacerbation.  -Positive for - SOB worsening, on BiPAP;ABG- PCO2 91, pH-7.34, HCO3 49  - keep BiPAP , monitoring O2sat,  -Continue antibiotics, steroids, bronchodilators.   -Pt is informed about smoking cessation.  -Bronchoscopy done 7/21/21      Community acquired pneumonia  -Positive for SOB, malaisia, leukocytosis, CXR infiltrate on right middle lobe  - MRSA and  VRE PCRs negative  -Blood cultures x2, pending    COPD exacerbation. GOLD 4 stage  -after discharge PFTs  -Home BiPAP eval    DM type 2     HTN      Plan   -BiPAP 24/8 cm H2O- to be used with daytime napping and at bedtime   -Home BiPAP evaluation for Severe COPD  -PFT reviewed   -ECHO reviewed.    -ATB per primary   -Continue Brovana, Pulmicort, and Duonebs QID- Metaneb  -VTE prophylaxis: Lovenox/SCDs  -ATB per primary  -Acapella/IS discussed and encouraged use   -Pneumonia panel (+) Parainfluenza   -Sleep study reviewed from 2018  -CXR reviewed no residual PTX  -CXR 2-view in AM  -Will keep appt with Amelia Jarrell CNP on 9/1/21 at 3:00 PM- with CXR 2-view prior to appt   -Surgical pathology reviewed (-) malignant cells   -Cytology - (-) malignant cells   -ABG this AM on 4LPM  -DME for BiPAP 24/8 cm H2O with 4LPM bleed in   -If patient does home today will need follow up at office in 1 week with CXR 2-view prior to appt to follow R apical PTX    Plan of car discussed with patient and primary RN  Meds and orders reviewed  Questions and concerns addressed. Electronically signed by   SEVERIANO Crooks CNP on 7/23/2021 at 8:39 AM     Addendum by Dr. Ever Christina MD:  I have seen and examined the patient independently. Face to face evaluation and examination was performed. The above evaluation and note has been reviewed. Labs and radiographs were reviewed. I Have discussed with Ms. IVAN Crooks CNP about this patient in detail. The above assessment and plan has been reviewed. Please see my modifications mentioned below. My modifications:  She is on 4 L of oxygen on nasal cannula. Chest x-ray performed today: Tiny right-sided apical pneumothorax. Continue PT OT  Completed 3 doses of azithromycin 500 mg IV daily  Receiving Rocephin 1 g IV daily today 6 today. Follow nocturnal pulse oximetry study performed on 22 July 2021 as a part of evaluation of her home BiPAP  All [facility] cultures were negative. Patient educated about the incentive spirometer. Continue incentive spirometer and Acapella as prescribed. Tomorrow morning x-ray ordered.       Jaquan Villegas MD 7/23/2021 5:48 PM

## 2021-07-23 NOTE — PROGRESS NOTES
Hospitalist Progress Note    Patient:  Anival Rao      Unit/Bed:4K-13013-A    YOB: 1955    MRN: [id number]       Acct: [de-identified]     PCP: SUSAN Luis    Date of Admission: 2021       Assessment/Plan:    Acute on chronic respiratory failure wit hypoxia/hypercapnia 2/2 COPD exacerbation, GOLD 4 with FEV1 20%, Active.: On BiPaP 26/10, 0.45 FiO2 -  CXR showed pulmonary congestion and right middle lobe infiltrate with mild flattening of diaphgragm. Pulmonary consulted. CTA shows no pulmonary emboli seen. There is questionable pulmonary arterial hypertension. There is subtotal collapse of the right middle lobe which could be due to mucous plug. Cannot exclude other endobronchial pathology. No distinct lesion seen. Patient was examined by pulmonology and had bronchoscopy yesterday on . Bronchoscopy showed thick light yellow-colored mucous plug noted in the right middle lobe intermedius bronchus. This was suctioned out and biopsies were taken. Lung biopsy of the right middle lobe showed benign pulmonary alveolar tissue and was negative for malignancy. Bronchial wash of the right lower lobe and right medial lobe showed acute inflammation with no malignant cells seen.  -  AB.40, pCO2 81, pO2 69, HCO3 50, BE 21.1. Sat 92% FiO2 .5    -  AB.35; pCO2 85; pO2 55; HCO3 47, BE 17.6; Sat 84%. FiO2 0.4                - Pulmonology Following.              - Continue NC @4LPM.   - Continue Brovana, Pulmicort, Duoneb, Prednisone, roflumilast as prescribed. - Home BiPAP eval. Will need follow up PFT's as OP.    - IS as tolerated encouraged.     Sepsis (POA) 2/2 Community acquired pneumonia, Active.: SIRS 3/4 Positive present on admission with likely source of infection. Presenting vital signs were blood pressure 181/74, temperature 97, respiratory rate 27, 92 bpm.  WBCs 15.8. Initial lactic acid was 1.4.   Portable chest x-ray demonstrates increased groundglass alveolar opacity in the right heart margin related to the medial segment of the right middle lobe. Opacities consistent with early acute middle lobe alveolar pulmonary infection. VRE negative. Sputum production that is yellow and thick. WBC trending down to 13.8 (7/19). CRP elevated at 2.54. Sed rate elevated at 68. Potential for inflammatory etiology. Fungal culture showed no yeast or hyphae observed. Pending respiratory viral culture. Rapid flu a and B was negative. MRSA negative. Covid negative. CTA showed no pulmonary emboli. Subtotal collapse of the right middle lobe. Therapeutic bronchoscopy showed mucous plug causing sub total collapse of right middle lobe. - Continue Zithromax 500 QD x3 doses, Ceftriaxone 1g Q24h              - Pneumonia panel demonstrated parainfluenza virus. Continue supportive care. - Blood cultures x2 show NG currently. Symptomatic primary HTN, Resolved: Noted to be hypertensive on admission with complaints of headache. Continued on lisinopril-hydrochlorothiazide and metoprolol. Hold parameters in place.              - Continue to monitor vitals     Noninsulin-dependent diabetes mellitus type 2, Chronic/Stable: Hyperglycemic upon presentation with most recent  POC glucose at 198. Likely confounded by concomitant corticosteroid use. - Continue to hold metformin              - Continue low dose SSI with hypoglycemia protocol     Heart failure with preserved ejection fraction,  EF 60-65%, Chronic: noted. Continue Lasix. Asymptomatic Bactiuria, Active: Incidental finding on urine culture. Patient has no signs or symptoms of urinary tract infection including no dysuria. Magana D/c'd. External catheter in place but patient is using restroom to void without problems. Continue to monitor.        Hx of HLD, Stable: Noted.  Continue Pravachol        Expected discharge date:  TBD     Disposition:    [x] Home       [] TCU [] Rehab       [] Psych       [] SNF       [] [place]       [] Other-    Chief Complaint: Progressive SOB    Hospital Course: Lance Fisher is a 73 y.o. female with a PMHx of severe COPD, HTN, non-insulin-dependent diabetes mellitus type 2, heart failure with preserved ejection fraction, and HLD who presents to [facility] with chief complaint of shortness of breath on BiPAP. She was a transfer from [facility] inpatient unit for potential bronchoscopy due to mucous plugging. Patient was intubated and extubated on 7/13/21 after being treated for 1 week of COPD exacerbation. Patient complains of shortness of breath, wheezing currently. Was placed on BiPAP while here initially at 26/10 0.5 FiO2. Currently on 24/8 with 0.5 FiO2. Still has increased work of breathing. Repeat ABG's show compensation and continued reducing CO2. Cleared for BiPAP use with daytime napping and continuous at bedtime. Home BiPAP evaluation. Patient to follow-up with pulmonology upon discharge for repeat PFTs. Pulmonology is following patient. They discussed Acapella and encouraged its use. Continue to monitor patient with intent to try to wean off BiPAP based on tolerance. MRSA screening was negative COVID-19 not detected rapid flu a and B- blood culture showed no growth x2, VRE was negative. Urine Cx showed E. Faecalis. Magana was D/c'd with external catheter in place. However patient has been voiding in the restroom herself. No symptoms of UTI. Pneumonia panel did result positive for parainfluenza. We will continue with supportive care. S/p bronchoscopy to right middle lobe with mucous plug suctioning. Subjective (past 24 hours): Patient was currently sitting in chair, awake and alert. Patient is on nasal cannula at 4 L/min. Denies chest pain, nausea, vomiting, diarrhea, constipation, headache, fevers, chills, dysuria. Pulmonology continues to recommend incentive spirometry. Currently deciding on her discharge plans. Patient pulmonology states the patient can be discharged home with the BiPAP. Patient still has shortness of breath however has improved clinically from initial presentation. Discuss goals of care with patient today regarding decreasing work of breathing as well as using IS to help with rehab for lungs. Will continue to monitor. ROS (12 point review of systems completed. Pertinent positives noted. Otherwise ROS is negative). Medications:  Reviewed    Infusion Medications    sodium chloride 25 mL (07/21/21 0230)    dextrose       Scheduled Medications    Arformoterol Tartrate  15 mcg Nebulization BID    budesonide  0.5 mg Nebulization BID    insulin regular  8 Units Intravenous Once    alogliptin  12.5 mg Oral Daily    insulin lispro  0-12 Units Subcutaneous TID WC    insulin lispro  0-6 Units Subcutaneous Nightly    metFORMIN  500 mg Oral BID WC    lidocaine  1 patch Transdermal Daily    pantoprazole  40 mg Oral QAM AC    sodium chloride flush  5-40 mL Intravenous 2 times per day    enoxaparin  40 mg Subcutaneous Daily    cefTRIAXone (ROCEPHIN) IV  1,000 mg Intravenous Q24H    pravastatin  10 mg Oral Daily    aspirin  81 mg Oral Daily    furosemide  20 mg Oral Daily    metoprolol tartrate  25 mg Oral BID    Roflumilast  500 mcg Oral Daily    lisinopril  20 mg Oral Daily    And    hydroCHLOROthiazide  25 mg Oral Daily    ipratropium-albuterol  1 ampule Inhalation 4x daily     PRN Meds: traMADol, ibuprofen, acetaminophen, sodium chloride flush, sodium chloride, ipratropium-albuterol, glucose, dextrose, glucagon (rDNA), dextrose      Intake/Output Summary (Last 24 hours) at 7/23/2021 1602  Last data filed at 7/23/2021 1244  Gross per 24 hour   Intake 1030 ml   Output 1700 ml   Net -670 ml       Diet:  ADULT DIET;  Regular; 4 carb choices (60 gm/meal)    Exam:  BP (!) 108/55   Pulse 93   Temp 97.7 °F (36.5 °C) (Oral)   Resp 22   Ht 5' 7.5\" (1.715 m)   Wt 227 lb 3.2 oz (103.1 kg)   SpO2 92%   BMI 35.06 kg/m²     General appearance: Sitting in bed accompanied by family. .  Acutely ill appearing however does appear improved clinically. Currently on nasal cannula at 6 L/min. Appears stated age and cooperative. HEENT: Normocephalic, atraumatic. PERRLA. EOMI. conjunctivae/corneas clear. Neck: Supple, with full range of motion. No jugular venous distention. Trachea midline. Respiratory:  Increased respiratory effort. Diminished breathsounds noted on b/l lower lung fields. Expiratory wheezes present diffusely. Currently on nasal cannula 6 L/min. Cardiovascular: Regular rate and rhythm with normal S1/S2 without murmurs, rubs or gallops. Abdomen: Soft, mild tenderness noted, non-distended with normal bowel sounds. Musculoskeletal: passive and active ROM x 4 extremities. Bandage on back noted. Skin: Skin color, texture, turgor normal.  No rashes or lesions. Neurologic:  Neurovascularly intact without any focal sensory/motor deficits. Cranial nerves: II-XII intact, grossly non-focal.  Psychiatric: Alert and oriented, thought content appropriate, normal insight  Capillary Refill: Brisk,< 3 seconds   Peripheral Pulses: +2 palpable, equal bilaterally       Labs:   Recent Labs     07/21/21  0557 07/23/21  1056   WBC 13.3* 12.4*   HGB 10.6* 10.2*   HCT 34.9* 34.0*    213     Recent Labs     07/21/21  0921 07/23/21  1115    138   K 4.1 3.8   CL 93* 90*   CO2 46* 42*   BUN 25* 21   CREATININE 0.8 0.7   CALCIUM 9.8 9.3     No results for input(s): AST, ALT, BILIDIR, BILITOT, ALKPHOS in the last 72 hours. No results for input(s): INR in the last 72 hours. No results for input(s): Maryan Pooja in the last 72 hours. Microbiology:      Urinalysis:    No results found for: Bonna Greet, BACTERIA, RBCUA, BLOODU, SPECGRAV, [address]    Radiology:  XR CHEST (2 VW)   Final Result   1. Tiny right apical pneumothorax.    2.  Right middle lobe opacity and right upper lobe nodule appear unchanged. This document has been electronically signed by: Maggie Otero MD on    07/23/2021 07:54 AM      XR CHEST (2 VW)   Final Result   1. No definite evidence for right sided pneumothorax. 2. Abnormal density in the right upper lobe which could represent atelectasis versus infiltrate. 3. 2.3 x 1.4 cm nodule in the right upper lobe. Calcified granuloma in the right apex. 4. Prominence of the pulmonary arteries which could represent pulmonary hypertension. 5. Atherosclerotic calcification in the aortic arch. **This report has been created using voice recognition software. It may contain minor errors which are inherent in voice recognition technology. **      Final report electronically signed by DR Rukhsana Ladd on 7/22/2021 8:23 AM      XR CHEST PORTABLE   Final Result   1. Normal heart size. No effusion seen. 2. Persistent findings of mild atelectasis/pneumonia versus postbiopsy changes in the right middle lobe, improved from earlier. Questionable mild atelectasis/pneumonia left lung base. 3. Suggestion of a persistent small less than 5% pneumothorax right apex versus overlying skin fold. 4. Follow-up chest x-ray recommended tomorrow morning. **This report has been created using voice recognition software. It may contain minor errors which are inherent in voice recognition technology. **      Final report electronically signed by Dr. Isaac Champion on 7/21/2021 6:33 PM      XR CHEST PORTABLE   Final Result   1. Normal heart size. Moderate infiltrate in the right middle lobe following recent bronchoscopy, consistent with atelectasis/pneumonia versus post biopsy bleeding or retained fluid resulting from bronchial washings. 2. Tiny less than 5% pneumothorax right apex. 3. Small benign-appearing nodule in the right and left apical region, likely poorly calcified granulomas.             **This report has been created using voice

## 2021-07-23 NOTE — CARE COORDINATION
Update: client has private pay [employer] home oxygen, prior w [facility]); they refused to accept client for BIPAP as she has been in collections w this agency; client aware and prefers new SNF w BIPAP when medically cleared/precerted; any [facility] company will need SNF to initiate home BIPAP referral; collaborated w ROSCOE Renee  Electronically signed by Anthony Lloyd RN on 7/23/2021 at 11:52 AM

## 2021-07-23 NOTE — PLAN OF CARE
Problem: Impaired respiratory status  Goal: Clear lung sounds  Outcome: Ongoing     Problem: Serum Glucose Level - Abnormal:  Goal: Ability to maintain appropriate glucose levels will improve  Description: Ability to maintain appropriate glucose levels will improve  Outcome: Ongoing     Problem: Pain Control  Goal: Maintain pain level at or below patient's acceptable level (or 5 if patient is unable to determine acceptable level)  Outcome: Ongoing     Problem: Cardiovascular  Goal: No DVT, peripheral vascular complications  Outcome: Ongoing  Goal: Hemodynamic stability  Outcome: Ongoing     Problem: Respiratory  Goal: No pulmonary complications  Outcome: Ongoing  Goal: O2 Sat > 90%  Outcome: Ongoing  Goal: Supplemental O2 requirements decreased  Outcome: Ongoing     Problem: GI  Goal: No bowel complications  Outcome: Ongoing     Problem: Nutrition  Goal: Optimal nutrition therapy  Outcome: Ongoing     Problem: Skin Integrity/Risk  Goal: No skin breakdown during hospitalization  Outcome: Ongoing     Problem: Musculor/Skeletal Functional Status  Goal: Absence of falls  Outcome: Ongoing     Problem: OXYGENATION/RESPIRATORY FUNCTION  Goal: Patient will maintain patent airway  Outcome: Ongoing  Goal: Patient will achieve/maintain normal respiratory rate/effort  Description: Respiratory rate and effort will be within normal limits for the patient  Outcome: Ongoing     Problem:   Goal: Adequate urinary output  Outcome: Ongoing     Problem: Discharge Planning:  Goal: Discharged to appropriate level of care  Description: Discharged to appropriate level of care  Outcome: Ongoing     Problem: Musculor/Skeletal Functional Status  Goal: Highest potential functional level  Outcome: Ongoing

## 2021-07-23 NOTE — PROGRESS NOTES
[facility] Palliative Care           Progress Note    Patient Name:  Elian Decker  Medical Record Number:  [id number]  YOB: 1955    Date:  7/23/2021  Time:  1:55 PM  Completed By:  Rakel Byrd RN, RN    Reason for Palliative Care Evaluation:  Goals/future planning    Advance Directives:none on file  [] [facility] DNR Form  [] Living Will  [] Medical POA    Current Code Status  [x] Full Resuscitation  [] DNR-Comfort Care-Arrest  [] DNR-Comfort Care  [] Limited   [] No CPR   [] No shock   [] No ET intubation/reintubation   [] No resuscitative medications   [] Other limitation:    Family/Patient Discussion:  Patient sitting up in the chair. Patient is alert and oriented to all spheres. No family present. Palliative care introduced. Discussion about advance directives and their significance. Discussion about code status levels and what each level entails. Discussed complications of rib fractures, brain and organ damage associated with resuscitative measures. Patient indicates that her quality of life is greatly impacted from her COPD. Patient states that she was very close to \"just wanting to die\" when she was having difficulty breathing this admission. Discussed intubation and the \"what ifs\" of if she would not be able to get off of the ventilator. Patient shares that she would not want to \"just be lying in the bed\" if things were not going well. Discussed the importance of conversation with her  so that he is aware of her wishes if it would get to the point of trach/feeding tube and long term ventilation. Patient states that she has informed her family of her wishes. Discussed the importance of no smoking/no second hand smoke. Patient shares that she can quit \"cold turkey\" but if her  smokes in the house or has cigarettes lying around, it is difficult for her. Discussed the importance of support of her . Patient was appreciative of the conversation.     Plan/Follow-Up: Updated primary RN, Laquita Van. Palliative care will remain available if further needs arise. Please do not hesitate to call prn.     Elmira Marti RN, RN  7/23/2021,  1:55 PM

## 2021-07-24 ENCOUNTER — APPOINTMENT (OUTPATIENT)
Dept: GENERAL RADIOLOGY | Age: 66
DRG: 871 | End: 2021-07-24
Attending: INTERNAL MEDICINE
Payer: COMMERCIAL

## 2021-07-24 LAB
GLUCOSE BLD-MCNC: 152 MG/DL (ref 70–108)
GLUCOSE BLD-MCNC: 158 MG/DL (ref 70–108)
GLUCOSE BLD-MCNC: 193 MG/DL (ref 70–108)
GLUCOSE BLD-MCNC: 308 MG/DL (ref 70–108)
MISC. #1 REFERENCE GROUP TEST: NORMAL

## 2021-07-24 PROCEDURE — 71046 X-RAY EXAM CHEST 2 VIEWS: CPT

## 2021-07-24 PROCEDURE — 2060000000 HC ICU INTERMEDIATE R&B

## 2021-07-24 PROCEDURE — 6370000000 HC RX 637 (ALT 250 FOR IP): Performed by: INTERNAL MEDICINE

## 2021-07-24 PROCEDURE — 82948 REAGENT STRIP/BLOOD GLUCOSE: CPT

## 2021-07-24 PROCEDURE — 6360000002 HC RX W HCPCS: Performed by: STUDENT IN AN ORGANIZED HEALTH CARE EDUCATION/TRAINING PROGRAM

## 2021-07-24 PROCEDURE — 2700000000 HC OXYGEN THERAPY PER DAY

## 2021-07-24 PROCEDURE — 94660 CPAP INITIATION&MGMT: CPT

## 2021-07-24 PROCEDURE — 94669 MECHANICAL CHEST WALL OSCILL: CPT

## 2021-07-24 PROCEDURE — 2580000003 HC RX 258: Performed by: STUDENT IN AN ORGANIZED HEALTH CARE EDUCATION/TRAINING PROGRAM

## 2021-07-24 PROCEDURE — 6370000000 HC RX 637 (ALT 250 FOR IP): Performed by: STUDENT IN AN ORGANIZED HEALTH CARE EDUCATION/TRAINING PROGRAM

## 2021-07-24 PROCEDURE — 94760 N-INVAS EAR/PLS OXIMETRY 1: CPT

## 2021-07-24 PROCEDURE — 94640 AIRWAY INHALATION TREATMENT: CPT

## 2021-07-24 PROCEDURE — 99233 SBSQ HOSP IP/OBS HIGH 50: CPT | Performed by: INTERNAL MEDICINE

## 2021-07-24 PROCEDURE — 6360000002 HC RX W HCPCS: Performed by: NURSE PRACTITIONER

## 2021-07-24 PROCEDURE — 99232 SBSQ HOSP IP/OBS MODERATE 35: CPT | Performed by: INTERNAL MEDICINE

## 2021-07-24 RX ORDER — LIDOCAINE 4 G/G
2 PATCH TOPICAL DAILY
Status: DISCONTINUED | OUTPATIENT
Start: 2021-07-24 | End: 2021-07-27 | Stop reason: HOSPADM

## 2021-07-24 RX ORDER — LIDOCAINE 4 G/G
1 PATCH TOPICAL DAILY
Status: DISCONTINUED | OUTPATIENT
Start: 2021-07-24 | End: 2021-07-27 | Stop reason: HOSPADM

## 2021-07-24 RX ORDER — PREDNISONE 20 MG/1
40 TABLET ORAL DAILY
Status: DISCONTINUED | OUTPATIENT
Start: 2021-07-24 | End: 2021-07-25

## 2021-07-24 RX ORDER — LIDOCAINE 4 G/G
3 PATCH TOPICAL DAILY
Status: DISCONTINUED | OUTPATIENT
Start: 2021-07-24 | End: 2021-07-27 | Stop reason: HOSPADM

## 2021-07-24 RX ADMIN — SODIUM CHLORIDE, PRESERVATIVE FREE 10 ML: 5 INJECTION INTRAVENOUS at 20:05

## 2021-07-24 RX ADMIN — ARFORMOTEROL TARTRATE 15 MCG: 15 SOLUTION RESPIRATORY (INHALATION) at 08:58

## 2021-07-24 RX ADMIN — PREDNISONE 40 MG: 20 TABLET ORAL at 13:45

## 2021-07-24 RX ADMIN — METOPROLOL TARTRATE 25 MG: 25 TABLET, FILM COATED ORAL at 20:04

## 2021-07-24 RX ADMIN — IBUPROFEN 400 MG: 400 TABLET, FILM COATED ORAL at 15:56

## 2021-07-24 RX ADMIN — ENOXAPARIN SODIUM 40 MG: 40 INJECTION, SOLUTION INTRAVENOUS; SUBCUTANEOUS at 09:50

## 2021-07-24 RX ADMIN — INSULIN LISPRO 2 UNITS: 100 INJECTION, SOLUTION INTRAVENOUS; SUBCUTANEOUS at 17:00

## 2021-07-24 RX ADMIN — METFORMIN HYDROCHLORIDE 500 MG: 500 TABLET ORAL at 17:29

## 2021-07-24 RX ADMIN — ARFORMOTEROL TARTRATE 15 MCG: 15 SOLUTION RESPIRATORY (INHALATION) at 21:10

## 2021-07-24 RX ADMIN — ALOGLIPTIN 12.5 MG: 12.5 TABLET, FILM COATED ORAL at 09:50

## 2021-07-24 RX ADMIN — HYDROCHLOROTHIAZIDE 25 MG: 25 TABLET ORAL at 09:50

## 2021-07-24 RX ADMIN — BUDESONIDE 500 MCG: 0.5 INHALANT RESPIRATORY (INHALATION) at 21:39

## 2021-07-24 RX ADMIN — ROFLUMILAST 500 MCG: 500 TABLET ORAL at 09:50

## 2021-07-24 RX ADMIN — IPRATROPIUM BROMIDE AND ALBUTEROL SULFATE 1 AMPULE: .5; 3 SOLUTION RESPIRATORY (INHALATION) at 08:58

## 2021-07-24 RX ADMIN — INSULIN LISPRO 2 UNITS: 100 INJECTION, SOLUTION INTRAVENOUS; SUBCUTANEOUS at 12:04

## 2021-07-24 RX ADMIN — SODIUM CHLORIDE, PRESERVATIVE FREE 10 ML: 5 INJECTION INTRAVENOUS at 09:49

## 2021-07-24 RX ADMIN — PRAVASTATIN SODIUM 10 MG: 10 TABLET ORAL at 09:50

## 2021-07-24 RX ADMIN — FUROSEMIDE 20 MG: 20 TABLET ORAL at 09:50

## 2021-07-24 RX ADMIN — IPRATROPIUM BROMIDE AND ALBUTEROL SULFATE 1 AMPULE: .5; 3 SOLUTION RESPIRATORY (INHALATION) at 21:10

## 2021-07-24 RX ADMIN — CEFTRIAXONE SODIUM 1000 MG: 1 INJECTION, POWDER, FOR SOLUTION INTRAMUSCULAR; INTRAVENOUS at 05:38

## 2021-07-24 RX ADMIN — IPRATROPIUM BROMIDE AND ALBUTEROL SULFATE 1 AMPULE: .5; 3 SOLUTION RESPIRATORY (INHALATION) at 12:26

## 2021-07-24 RX ADMIN — TRAMADOL HYDROCHLORIDE 50 MG: 50 TABLET ORAL at 05:39

## 2021-07-24 RX ADMIN — ASPIRIN 81 MG: 81 TABLET, CHEWABLE ORAL at 09:50

## 2021-07-24 RX ADMIN — INSULIN LISPRO 2 UNITS: 100 INJECTION, SOLUTION INTRAVENOUS; SUBCUTANEOUS at 09:51

## 2021-07-24 RX ADMIN — IPRATROPIUM BROMIDE AND ALBUTEROL SULFATE 1 AMPULE: .5; 3 SOLUTION RESPIRATORY (INHALATION) at 17:49

## 2021-07-24 RX ADMIN — METFORMIN HYDROCHLORIDE 500 MG: 500 TABLET ORAL at 09:50

## 2021-07-24 RX ADMIN — LISINOPRIL 20 MG: 20 TABLET ORAL at 09:50

## 2021-07-24 RX ADMIN — BUDESONIDE 500 MCG: 0.5 INHALANT RESPIRATORY (INHALATION) at 08:58

## 2021-07-24 RX ADMIN — PANTOPRAZOLE SODIUM 40 MG: 40 TABLET, DELAYED RELEASE ORAL at 05:39

## 2021-07-24 RX ADMIN — IBUPROFEN 400 MG: 400 TABLET, FILM COATED ORAL at 02:03

## 2021-07-24 ASSESSMENT — PAIN DESCRIPTION - PAIN TYPE
TYPE: CHRONIC PAIN

## 2021-07-24 ASSESSMENT — PAIN DESCRIPTION - LOCATION
LOCATION: BACK
LOCATION: BACK

## 2021-07-24 ASSESSMENT — PAIN SCALES - GENERAL
PAINLEVEL_OUTOF10: 4
PAINLEVEL_OUTOF10: 3
PAINLEVEL_OUTOF10: 5
PAINLEVEL_OUTOF10: 4

## 2021-07-24 ASSESSMENT — PAIN DESCRIPTION - ONSET: ONSET: ON-GOING

## 2021-07-24 ASSESSMENT — PAIN DESCRIPTION - FREQUENCY: FREQUENCY: CONTINUOUS

## 2021-07-24 ASSESSMENT — PAIN - FUNCTIONAL ASSESSMENT: PAIN_FUNCTIONAL_ASSESSMENT: ACTIVITIES ARE NOT PREVENTED

## 2021-07-24 ASSESSMENT — PAIN DESCRIPTION - ORIENTATION: ORIENTATION: MID;LEFT;RIGHT

## 2021-07-24 ASSESSMENT — PAIN DESCRIPTION - PROGRESSION: CLINICAL_PROGRESSION: NOT CHANGED

## 2021-07-24 ASSESSMENT — PAIN DESCRIPTION - DIRECTION: RADIATING_TOWARDS: THROUGHOUT BACK

## 2021-07-24 ASSESSMENT — PAIN DESCRIPTION - DESCRIPTORS: DESCRIPTORS: ACHING;CONSTANT

## 2021-07-24 NOTE — PLAN OF CARE
appropriate level of care  Description: Discharged to appropriate level of care  Outcome: Ongoing   Plan to discharge [facility], possibly Monday. Care plan reviewed with patient. Patient verbalizes understanding of the plan of care and contributes to goal setting.

## 2021-07-24 NOTE — PROGRESS NOTES
[facility]      Patient - Asa Luong   MRN -  [id number]   [facility]   - 1955      Date of Admission -  2021 12:12 AM  Date of evaluation -  2021  Room - -13013-A   Hospital Day - 6  Consulting - Maciel Manzanares MD Primary Care Physician - SUSAN Rahman     Problem List      Active Hospital Problems    Diagnosis Date Noted    Diabetes mellitus [facility]) [E11.9]     Collapse of right lung [J98.11]     Abnormal CT of the chest [R93.89]     COPD (chronic obstructive pulmonary disease) ([facility] [address] [J44.9]     Smoker [F17.200]     Respiratory failure with hypoxia and hypercapnia ([address] [J96.91, J96.92] 2021     Reason for Consult    COPD exacerbation and shortness of breath  HPI   History Obtained From: Patient and electronic medical record. Asa Luong is a 73 y.o. female,  PMHx off COPD, CHF,  DM type 2, HTN, HLD, is admitted to the hospital yesterday  for  shortness of breath and possible bronchocopy. Patient transferred from [facility] inpatient unit for bronchoscopy for mucous plug. Patient was being treated for COPD exacerbation for a week at [facility]; intubated and extubated on 21. She admits sputum production described as yellow and thick. Additional symptoms are headache, fatigue, abdominal pain. Denies blood in sputum. Reports associated headache. Denies chest pain, abdominal pain, dysuria, chills, fever, dizziness, lightheadedness     She is having shortness of breath: Yes  Onset: worsening past month  Duration: on 4L nasal canula home oxygen for yrs but 1months SOB is worsening. Functional status prior to beginning of symptoms: with NC Oxygen half block/s on level ground. Current functional capacity on level ground: 0 block/s on level ground. She can climb steps: No  She admits to orthopnea.     Past 24 Hours   -Currently on O2 4LPM- 94%  -Waiting for CXR to be done today  -No events overnight   -Used BiPAP with 24/8cm H20 last night.  -All other systems reviewed.      PMHx   Past Medical History      Diagnosis Date    CHF (congestive heart failure) (HCC)     COPD (chronic obstructive pulmonary disease) (HCC)     Diabetes mellitus ([facility] [address]     Diastolic dysfunction     Environmental and seasonal allergies     HLD (hyperlipidemia)     Hypertension       Past Surgical History        Procedure Laterality Date    APPENDECTOMY      BRONCHOSCOPY N/A 7/21/2021    BRONCHOSCOPY performed [facility]    BRONCHOSCOPY  7/21/2021    [facility]    BRONCHOSCOPY  7/21/2021    BRONCHOSCOPY/TRANSBRONCHIAL LUNG BIOPSY performed by Sammy Mosley MD at [address] [place] [address]      FOOT SURGERY      HERNIA REPAIR       Meds    Current Medications    lidocaine  1 patch Transdermal Daily    Or    lidocaine  2 patch Transdermal Daily    Or    lidocaine  3 patch Transdermal Daily    predniSONE  40 mg Oral Daily    Arformoterol Tartrate  15 mcg Nebulization BID    budesonide  0.5 mg Nebulization BID    insulin regular  8 Units Intravenous Once    alogliptin  12.5 mg Oral Daily    insulin lispro  0-12 Units Subcutaneous TID WC    insulin lispro  0-6 Units Subcutaneous Nightly    metFORMIN  500 mg Oral BID WC    pantoprazole  40 mg Oral QAM AC    sodium chloride flush  5-40 mL Intravenous 2 times per day    enoxaparin  40 mg Subcutaneous Daily    pravastatin  10 mg Oral Daily    aspirin  81 mg Oral Daily    furosemide  20 mg Oral Daily    metoprolol tartrate  25 mg Oral BID    Roflumilast  500 mcg Oral Daily    lisinopril  20 mg Oral Daily    And    hydroCHLOROthiazide  25 mg Oral Daily    ipratropium-albuterol  1 ampule Inhalation 4x daily     traMADol, ibuprofen, acetaminophen, sodium chloride flush, sodium chloride, ipratropium-albuterol, glucose, dextrose, glucagon (rDNA), dextrose  IV Drips/Infusions   sodium chloride 25 mL (07/21/21 0230)    dextrose       Home Medications  Medications Prior to Admission: ipratropium-albuterol (DUONEB) 0.5-2.5 (3) MG/3ML SOLN nebulizer solution, Inhale 3 mLs into the lungs every 4 hours as needed for Shortness of Breath  budesonide (PULMICORT) 0.5 MG/2ML nebulizer suspension, Take 2 mLs by nebulization 2 times daily  albuterol-ipratropium (COMBIVENT RESPIMAT)  MCG/ACT AERS inhaler, Inhale 1 puff into the lungs every 6 hours as needed for Wheezing or Shortness of Breath  albuterol sulfate  (90 Base) MCG/ACT inhaler, Inhale 2 puffs into the lungs every 4 hours as needed for Wheezing or Shortness of Breath  levocetirizine (XYZAL) 5 MG tablet, Take 1 tablet by mouth nightly  fluticasone (FLONASE) 50 MCG/ACT nasal spray, 2 sprays by Nasal route daily  Roflumilast (DALIRESP) 500 MCG tablet, Take 1 tablet by mouth daily  aspirin 81 MG tablet, Take 81 mg by mouth daily  metoprolol tartrate (LOPRESSOR) 25 MG tablet, Take 25 mg by mouth 2 times daily  lisinopril-hydrochlorothiazide (PRINZIDE;ZESTORETIC) 20-25 MG per tablet, Take 1 tablet by mouth daily  metFORMIN (GLUCOPHAGE) 500 MG tablet, Take 500 mg by mouth 2 times daily (with meals) 1,000 mg at supper. Cholecalciferol (VITAMIN D) 2000 units CAPS capsule, Take by mouth  Cyanocobalamin (B-12 PO), Take by mouth  pravastatin (PRAVACHOL) 10 MG tablet, Take 10 mg by mouth daily  Pseudoephedrine-Guaifenesin (MUCINEX D PO), Take by mouth as needed   Ibuprofen (ADVIL PO), Take by mouth as needed  FUROSEMIDE PO, Take 20 mg by mouth   Sodium Chloride-Sodium Bicarb (AYR SALINE NASAL RINSE) 1.57 g PACK, 1 packet by Nasal route 2 times daily as needed (sinus congestion) (Patient not taking: Reported on 5/26/2021)  Multiple Vitamins-Minerals (THERAPEUTIC MULTIVITAMIN-MINERALS) tablet, Take 1 tablet by mouth daily  Diet    ADULT DIET;  Regular; 4 carb choices (60 gm/meal)  Allergies    Excedrin extra strength [aspirin-acetaminophen-caffeine], Norco [hydrocodone-acetaminophen], and Tylenol [acetaminophen]  Social History     Social History     Socioeconomic History    Marital status:      Spouse name: Not on file    Number of children: Not on file    Years of education: Not on file    Highest education level: Not on file   Occupational History    Not on file   Tobacco Use    Smoking status: Current Some Day Smoker     Packs/day: 1.00     Years: 42.00     Pack years: 42.00     Start date: 3/22/1978    Smokeless tobacco: Never Used    Tobacco comment: 1 pack every 2-3 weeks   Substance and Sexual Activity    Alcohol use: No    Drug use: No    Sexual activity: Not on file   Other Topics Concern    Not on file   Social History Narrative    Not on file     Social Determinants of Health     Financial Resource Strain:     Difficulty of Paying Living Expenses:    Food Insecurity:     Worried About Running Out of Food in the Last Year:     Ran Out of Food in the Last Year:    Transportation Needs:     Lack of Transportation (Medical):  Lack of Transportation (Non-Medical):    Physical Activity:     Days of Exercise per Week:     Minutes of Exercise per Session:    Stress:     Feeling of Stress :    Social Connections:     Frequency of Communication with Friends and Family:     Frequency of Social Gatherings with Friends and Family:     Attends [employer] Services:     Active Member of Clubs or Organizations:     Attends Club or Organization Meetings:     Marital Status:    Intimate Partner Violence:     Fear of Current or Ex-Partner:     Emotionally Abused:     Physically Abused:     Sexually Abused:      Family History    No family history on file. Sleep History    Never diagnosed with sleep apnea in the past.  Occupational history   Occupation:  She is current working: No  Type of profession: retired.                          History of tobacco smoking:Yes  Amount of tobacco smokin.0 PPD. Years of tobacco smokin. Quit smoking: Yes. Quit year:few yrs ago   Current smoker: No.       History of recreational or IV drug use in the past:NO     History of exposure to coal mines/coal dust: NO  History of exposure to foundry dust/welding: NO  History of exposure to quarry/silica/sandblasting: NO  History of exposure to asbestos/working with breaks/ships: NO  History of exposure to farm dust: NO  History of recent travel to long distances: NO  History of exposure to birds, pigeons, or chickens in the past:NO  History of pulmonary embolism in the past: No     denies       History of DVT in the past:No             Vitals     height is 5' 7.5\" (1.715 m) and weight is 230 lb 11.2 oz (104.6 kg). Her oral temperature is 97.9 °F (36.6 °C). Her blood pressure is 109/56 (abnormal) and her pulse is 103. Her respiration is 20 and oxygen saturation is 92%. Body mass index is 35.6 kg/m². SUPPLEMENTAL O2: O2 Flow Rate (L/min): 4.5 L/min     I/O        Intake/Output Summary (Last 24 hours) at 2021 1258  Last data filed at 2021 1146  Gross per 24 hour   Intake 100 ml   Output 750 ml   Net -650 ml     I/O last 3 completed shifts: In: 460 [P.O.:460]  Out: 1450 [Urine:1450]   Patient Vitals for the past 96 hrs (Last 3 readings):   Weight   21 0315 230 lb 11.2 oz (104.6 kg)   21 0430 227 lb 3.2 oz (103.1 kg)   21 0530 228 lb 8 oz (103.6 kg)       Exam   Constitutional: Patient appears in no distress on 4LPM via nasal cannula   Mouth/Throat: Oropharynx is clear and moist.  No oral thrush. Neck: Neck supple. Cardiovascular: S1 and S2 heard. No murmurs. Pulmonary/Chest: Bilateral air entry present. Good breath sounds on both sides, diminished at bases. Bilateral expiratory wheezes. No rales. Abdominal: Soft. Obese. No tenderness. Extremities: Patient exhibits no edema. Neurological: Patient is awake and alert. Labs  - Old records and notes have been reviewed in [facility]   ABG  Lab Results   Component Value Date    PH 7.37 07/23/2021    PO2 65 07/23/2021    PCO2 80 07/23/2021    HCO3 46 07/23/2021    O2SAT 90 07/23/2021     Lab Results   Component Value Date    IFIO2 4 07/23/2021    MODE BiLevel 07/18/2021    SETPEEP 10.0 07/19/2021     CBC  Recent Labs     07/23/21  1056   WBC 12.4*   RBC 3.26*   HGB 10.2*   HCT 34.0*   .3*   MCH 31.3   MCHC 30.0*      MPV 11.9      BMP  Recent Labs     07/23/21  1115      K 3.8   CL 90*   CO2 42*   BUN 21   CREATININE 0.7   GLUCOSE 187*   CALCIUM 9.3     LFT  No results for input(s): AST, ALT, ALB, BILITOT, ALKPHOS, LIPASE in the last 72 hours. Invalid input(s): AMYLASE  TROP  No results found for: TROPONINT  BNP  No results for input(s): BNP in the last 72 hours. Lactic Acid  No results for input(s): LACTA in the last 72 hours. INR  No results for input(s): INR, PROTIME in the last 72 hours. PTT  No results for input(s): APTT in the last 72 hours. Glucose  Recent Labs     07/23/21 1954 07/24/21  0805 07/24/21  1139   POCGLU 162* 152* 193*     UA No results for input(s): SPECGRAV, PHUR, COLORU, CLARITYU, MUCUS, PROTEINU, BLOODU, RBCUA, WBCUA, BACTERIA, NITRU, GLUCOSEU, BILIRUBINUR, UROBILINOGEN, KETUA, LABCAST, LABCASTTY, AMORPHOS in the last 72 hours. Invalid input(s): CRYSTALS.     PFTs     11/4/2020        Sleep studies   Pt stated that she had sleep study a yr ago and never diagnosed with sleep apnea    Cultures    Urine culture: gram (+) cocci chains  VRE (-)  Blood culture: no growth; prelim  COVID-19, Rapid [[id number]] Collected: 07/19/21 1520   Order Status: Completed Specimen: Nasopharyngeal Swab Updated: 07/19/21 1540    SARS-CoV-2, NAAT NOT DETECTED    Comment: Rapid NAAT:   Negative results should be treated as presumptive and,   if inconsistent with clinical signs and symptoms or necessary for patient management, should be tested with an alternative molecular   assay. Negative results do not preclude SARS-CoV-2 infection and   should not be used as the sole basis for patient management decisions. This test has been authorized by the FDA under an Emergency Use   Authorization (EUA) for use by authorized laboratories. Fact sheet for Healthcare Providers:   Murali   Fact sheet for Patients: Mario.scar     METHODOLOGY: Isothermal Nucleic Acid Amplification   Performed at [address], [address]         7/21/21  Bronchoscopy   Endobronchial findings:   Trachea: Normal mucosa. Milly: Normal mucosa  Right main bronchus: Normal mucosa  Right upper lobe bronchus: Normal mucosa  Right Middle lobe bronchus:She was noted to have thick light yellow colored mucus plug noted in the bronchus intermedius. The mucus plug was removed by suctioning. After removing mucus plug, she was noted to have normal mucosa  Right Lower lobe bronchus:Normal mucosa  Left main bronchus: Normal mucosa  Left upper lobe bronchus: Normal mucosa  Left lower lobe bronchus: Normal mucosa     No endobronchial lesions seen    7/21/21  Surgical Pathology  Clinical Information: RML OF LUNG     FINAL DIAGNOSIS:   Lung, right middle lobe, biopsy:    Benign pulmonary alveolar tissue.    Negative for malignancy.     See microscopic.     7/21/21  Cytology  FINAL RESULTS:   Acute inflammation. No malignant cells seen. EKG   Normal sinus rhythm  Right bundle branch block  Abnormal ECG  No previous ECGs available  Echocardiogram   2/19/20      Radiology    CXR   7/24/21  2 view chest x-ray   1. Tiny right apical pneumothorax. 2. Right middle lobe opacity and right upper lobe nodule appear unchanged. 7/18/2021  Findings:   No pleural effusion. Heart size normal.   No acute fracture.            Impression   Impression:   There is increased and concerns addressed.        Electronically signed by   Governor MD Celia on 7/24/2021 at 12:58 PM

## 2021-07-24 NOTE — PROGRESS NOTES
Assessment and Plan:        1. Acute on chronic respiratory failure with hypoxia and hypercapnea; still gets very sob with minimal activity. May not be able to function in home this way. Has had 5 days of antibiotics; will stop  2. Diabetes- reasonable control      CC:  sob  HPI:  Pt with copd, smoker, dm, transfer from outside facility due to possible mucus plug. Had bronch with removal of some mucus. Still severely compromised. ROS (12 point review of systems completed. Pertinent positives noted.  Otherwise ROS is negative) :   PMH:  Per HPI  SHX: smoker, , lives at home  [address]: Noncontributory    Allergies: See above    Medications:     sodium chloride 25 mL (07/21/21 0230)    dextrose        Arformoterol Tartrate  15 mcg Nebulization BID    budesonide  0.5 mg Nebulization BID    insulin regular  8 Units Intravenous Once    alogliptin  12.5 mg Oral Daily    insulin lispro  0-12 Units Subcutaneous TID WC    insulin lispro  0-6 Units Subcutaneous Nightly    metFORMIN  500 mg Oral BID WC    lidocaine  1 patch Transdermal Daily    pantoprazole  40 mg Oral QAM AC    sodium chloride flush  5-40 mL Intravenous 2 times per day    enoxaparin  40 mg Subcutaneous Daily    pravastatin  10 mg Oral Daily    aspirin  81 mg Oral Daily    furosemide  20 mg Oral Daily    metoprolol tartrate  25 mg Oral BID    Roflumilast  500 mcg Oral Daily    lisinopril  20 mg Oral Daily    And    hydroCHLOROthiazide  25 mg Oral Daily    ipratropium-albuterol  1 ampule Inhalation 4x daily       Vital Signs:   BP (!) 104/58   Pulse 98   Temp 98.1 °F (36.7 °C) (Oral)   Resp 24   Ht 5' 7.5\" (1.715 m)   Wt 230 lb 11.2 oz (104.6 kg)   SpO2 95%   BMI 35.60 kg/m²      Intake/Output Summary (Last 24 hours) at 7/24/2021 3624  Last data filed at 7/24/2021 0313  Gross per 24 hour   Intake 460 ml   Output 1450 ml   Net -990 ml        Physical Examination: General appearance - overweight and chronically ill appearing  Mental status - alert, oriented to person, place, and time  Neck - supple, no significant adenopathy, no JVD, or carotid bruits  Chest - decreased air entry noted bilateral  Heart - normal rate, regular rhythm, normal S1, S2, no murmurs, rubs, clicks or gallops  Abdomen - soft, nontender, nondistended, no masses or organomegaly  Neurological - alert, oriented, normal speech, no focal findings or movement disorder noted  Musculoskeletal - no muscular tenderness noted  Extremities - no pedal edema noted  Skin - normal coloration and turgor, no rashes, no suspicious skin lesions noted    Data: (All radiographs, tracings, PFTs, and imaging are personally viewed and interpreted unless otherwise noted).           Electronically signed by Kellee Fischer MD on 7/24/2021 at 7:47 AM

## 2021-07-24 NOTE — PLAN OF CARE
Problem: Impaired respiratory status  Goal: Clear lung sounds  Outcome: Ongoing   Continue aerosol tx's to improve aeration of lungs.

## 2021-07-25 LAB
ALBUMIN SERPL-MCNC: 2.8 G/DL (ref 3.5–5.1)
ALP BLD-CCNC: 69 U/L (ref 38–126)
ALT SERPL-CCNC: 42 U/L (ref 11–66)
ANION GAP SERPL CALCULATED.3IONS-SCNC: 11 MEQ/L (ref 8–16)
AST SERPL-CCNC: 18 U/L (ref 5–40)
BILIRUB SERPL-MCNC: 0.2 MG/DL (ref 0.3–1.2)
BILIRUBIN DIRECT: < 0.2 MG/DL (ref 0–0.3)
BUN BLDV-MCNC: 26 MG/DL (ref 7–22)
CALCIUM SERPL-MCNC: 8.8 MG/DL (ref 8.5–10.5)
CHLORIDE BLD-SCNC: 87 MEQ/L (ref 98–111)
CO2: 36 MEQ/L (ref 23–33)
CREAT SERPL-MCNC: 0.9 MG/DL (ref 0.4–1.2)
GFR SERPL CREATININE-BSD FRML MDRD: 63 ML/MIN/1.73M2
GLUCOSE BLD-MCNC: 138 MG/DL (ref 70–108)
GLUCOSE BLD-MCNC: 174 MG/DL (ref 70–108)
GLUCOSE BLD-MCNC: 327 MG/DL (ref 70–108)
GLUCOSE BLD-MCNC: 389 MG/DL (ref 70–108)
GLUCOSE BLD-MCNC: 457 MG/DL (ref 70–108)
MAGNESIUM: 1.6 MG/DL (ref 1.6–2.4)
POTASSIUM SERPL-SCNC: 4.6 MEQ/L (ref 3.5–5.2)
REASON FOR REJECTION: NORMAL
REASON FOR REJECTION: NORMAL
REJECTED TEST: NORMAL
REJECTED TEST: NORMAL
SODIUM BLD-SCNC: 134 MEQ/L (ref 135–145)
TOTAL PROTEIN: 5.6 G/DL (ref 6.1–8)

## 2021-07-25 PROCEDURE — 80053 COMPREHEN METABOLIC PANEL: CPT

## 2021-07-25 PROCEDURE — 6370000000 HC RX 637 (ALT 250 FOR IP): Performed by: INTERNAL MEDICINE

## 2021-07-25 PROCEDURE — 99232 SBSQ HOSP IP/OBS MODERATE 35: CPT | Performed by: INTERNAL MEDICINE

## 2021-07-25 PROCEDURE — 83735 ASSAY OF MAGNESIUM: CPT

## 2021-07-25 PROCEDURE — 94640 AIRWAY INHALATION TREATMENT: CPT

## 2021-07-25 PROCEDURE — 36415 COLL VENOUS BLD VENIPUNCTURE: CPT

## 2021-07-25 PROCEDURE — 94660 CPAP INITIATION&MGMT: CPT

## 2021-07-25 PROCEDURE — 6360000002 HC RX W HCPCS: Performed by: INTERNAL MEDICINE

## 2021-07-25 PROCEDURE — 6360000002 HC RX W HCPCS: Performed by: NURSE PRACTITIONER

## 2021-07-25 PROCEDURE — 82948 REAGENT STRIP/BLOOD GLUCOSE: CPT

## 2021-07-25 PROCEDURE — 6370000000 HC RX 637 (ALT 250 FOR IP): Performed by: STUDENT IN AN ORGANIZED HEALTH CARE EDUCATION/TRAINING PROGRAM

## 2021-07-25 PROCEDURE — 6360000002 HC RX W HCPCS: Performed by: STUDENT IN AN ORGANIZED HEALTH CARE EDUCATION/TRAINING PROGRAM

## 2021-07-25 PROCEDURE — 2580000003 HC RX 258: Performed by: INTERNAL MEDICINE

## 2021-07-25 PROCEDURE — 2580000003 HC RX 258: Performed by: STUDENT IN AN ORGANIZED HEALTH CARE EDUCATION/TRAINING PROGRAM

## 2021-07-25 PROCEDURE — 2060000000 HC ICU INTERMEDIATE R&B

## 2021-07-25 PROCEDURE — 94669 MECHANICAL CHEST WALL OSCILL: CPT

## 2021-07-25 PROCEDURE — 94761 N-INVAS EAR/PLS OXIMETRY MLT: CPT

## 2021-07-25 PROCEDURE — 82248 BILIRUBIN DIRECT: CPT

## 2021-07-25 RX ORDER — MAGNESIUM SULFATE IN WATER 40 MG/ML
2000 INJECTION, SOLUTION INTRAVENOUS PRN
Status: DISCONTINUED | OUTPATIENT
Start: 2021-07-25 | End: 2021-07-27 | Stop reason: HOSPADM

## 2021-07-25 RX ADMIN — METFORMIN HYDROCHLORIDE 500 MG: 500 TABLET ORAL at 10:08

## 2021-07-25 RX ADMIN — ALOGLIPTIN 12.5 MG: 12.5 TABLET, FILM COATED ORAL at 10:08

## 2021-07-25 RX ADMIN — ASPIRIN 81 MG: 81 TABLET, CHEWABLE ORAL at 10:08

## 2021-07-25 RX ADMIN — IBUPROFEN 400 MG: 400 TABLET, FILM COATED ORAL at 07:23

## 2021-07-25 RX ADMIN — IPRATROPIUM BROMIDE AND ALBUTEROL SULFATE 1 AMPULE: .5; 3 SOLUTION RESPIRATORY (INHALATION) at 13:37

## 2021-07-25 RX ADMIN — ARFORMOTEROL TARTRATE 15 MCG: 15 SOLUTION RESPIRATORY (INHALATION) at 21:58

## 2021-07-25 RX ADMIN — IBUPROFEN 400 MG: 400 TABLET, FILM COATED ORAL at 13:55

## 2021-07-25 RX ADMIN — IPRATROPIUM BROMIDE AND ALBUTEROL SULFATE 1 AMPULE: .5; 3 SOLUTION RESPIRATORY (INHALATION) at 21:57

## 2021-07-25 RX ADMIN — MAGNESIUM SULFATE HEPTAHYDRATE 2000 MG: 40 INJECTION, SOLUTION INTRAVENOUS at 20:14

## 2021-07-25 RX ADMIN — IBUPROFEN 400 MG: 400 TABLET, FILM COATED ORAL at 02:48

## 2021-07-25 RX ADMIN — TRAMADOL HYDROCHLORIDE 50 MG: 50 TABLET ORAL at 00:44

## 2021-07-25 RX ADMIN — PRAVASTATIN SODIUM 10 MG: 10 TABLET ORAL at 10:08

## 2021-07-25 RX ADMIN — INSULIN LISPRO 10 UNITS: 100 INJECTION, SOLUTION INTRAVENOUS; SUBCUTANEOUS at 17:06

## 2021-07-25 RX ADMIN — ARFORMOTEROL TARTRATE 15 MCG: 15 SOLUTION RESPIRATORY (INHALATION) at 10:05

## 2021-07-25 RX ADMIN — SODIUM CHLORIDE, PRESERVATIVE FREE 10 ML: 5 INJECTION INTRAVENOUS at 10:10

## 2021-07-25 RX ADMIN — PANTOPRAZOLE SODIUM 40 MG: 40 TABLET, DELAYED RELEASE ORAL at 06:50

## 2021-07-25 RX ADMIN — VORICONAZOLE 635 MG: 200 INJECTION, POWDER, LYOPHILIZED, FOR SOLUTION INTRAVENOUS at 17:30

## 2021-07-25 RX ADMIN — BUDESONIDE 500 MCG: 0.5 INHALANT RESPIRATORY (INHALATION) at 10:06

## 2021-07-25 RX ADMIN — FUROSEMIDE 20 MG: 20 TABLET ORAL at 10:08

## 2021-07-25 RX ADMIN — METFORMIN HYDROCHLORIDE 500 MG: 500 TABLET ORAL at 17:08

## 2021-07-25 RX ADMIN — PREDNISONE 40 MG: 20 TABLET ORAL at 10:08

## 2021-07-25 RX ADMIN — ROFLUMILAST 500 MCG: 500 TABLET ORAL at 10:08

## 2021-07-25 RX ADMIN — INSULIN LISPRO 2 UNITS: 100 INJECTION, SOLUTION INTRAVENOUS; SUBCUTANEOUS at 12:16

## 2021-07-25 RX ADMIN — IPRATROPIUM BROMIDE AND ALBUTEROL SULFATE 1 AMPULE: .5; 3 SOLUTION RESPIRATORY (INHALATION) at 18:33

## 2021-07-25 RX ADMIN — IPRATROPIUM BROMIDE AND ALBUTEROL SULFATE 1 AMPULE: .5; 3 SOLUTION RESPIRATORY (INHALATION) at 10:05

## 2021-07-25 RX ADMIN — ENOXAPARIN SODIUM 40 MG: 40 INJECTION, SOLUTION INTRAVENOUS; SUBCUTANEOUS at 10:07

## 2021-07-25 ASSESSMENT — PAIN SCALES - GENERAL
PAINLEVEL_OUTOF10: 4
PAINLEVEL_OUTOF10: 5
PAINLEVEL_OUTOF10: 3
PAINLEVEL_OUTOF10: 4

## 2021-07-25 ASSESSMENT — PAIN DESCRIPTION - ORIENTATION: ORIENTATION: MID;RIGHT;LEFT

## 2021-07-25 ASSESSMENT — PAIN DESCRIPTION - PAIN TYPE: TYPE: CHRONIC PAIN

## 2021-07-25 ASSESSMENT — PAIN DESCRIPTION - ONSET: ONSET: ON-GOING

## 2021-07-25 ASSESSMENT — PAIN DESCRIPTION - DIRECTION: RADIATING_TOWARDS: ACROSS BACK

## 2021-07-25 ASSESSMENT — PAIN DESCRIPTION - LOCATION: LOCATION: BACK

## 2021-07-25 ASSESSMENT — PAIN DESCRIPTION - DESCRIPTORS: DESCRIPTORS: ACHING;CONSTANT

## 2021-07-25 ASSESSMENT — PAIN DESCRIPTION - FREQUENCY: FREQUENCY: CONTINUOUS

## 2021-07-25 ASSESSMENT — PAIN DESCRIPTION - PROGRESSION: CLINICAL_PROGRESSION: NOT CHANGED

## 2021-07-25 ASSESSMENT — PAIN - FUNCTIONAL ASSESSMENT: PAIN_FUNCTIONAL_ASSESSMENT: ACTIVITIES ARE NOT PREVENTED

## 2021-07-25 NOTE — PROGRESS NOTES
Assessment and Plan:        1. Acute on chronic respiratory failure with hypoxia and hypercapnea; still gets very sob with minimal activity. May not be able to function in home this way. Has had 5 days of antibiotics; will stop. Prednisone started 7.24, agree as she has wheezing. 2. Diabetes- reasonable control. monitor      CC:  sob  HPI:  Pt with copd, smoker, dm, transfer from outside facility due to possible mucus plug. Had bronch with removal of some mucus. Still severely compromised. ROS (12 point review of systems completed. Pertinent positives noted.  Otherwise ROS is negative) :   PMH:  Per HPI  SHX: smoker, , lives at home  [id number]: Noncontributory    Allergies: See above    Medications:     sodium chloride 25 mL (07/21/21 0230)    dextrose        lidocaine  1 patch Transdermal Daily    Or    lidocaine  2 patch Transdermal Daily    Or    lidocaine  3 patch Transdermal Daily    predniSONE  40 mg Oral Daily    Arformoterol Tartrate  15 mcg Nebulization BID    budesonide  0.5 mg Nebulization BID    insulin regular  8 Units Intravenous Once    alogliptin  12.5 mg Oral Daily    insulin lispro  0-12 Units Subcutaneous TID WC    insulin lispro  0-6 Units Subcutaneous Nightly    metFORMIN  500 mg Oral BID WC    pantoprazole  40 mg Oral QAM AC    sodium chloride flush  5-40 mL Intravenous 2 times per day    enoxaparin  40 mg Subcutaneous Daily    pravastatin  10 mg Oral Daily    aspirin  81 mg Oral Daily    furosemide  20 mg Oral Daily    metoprolol tartrate  25 mg Oral BID    Roflumilast  500 mcg Oral Daily    lisinopril  20 mg Oral Daily    And    hydroCHLOROthiazide  25 mg Oral Daily    ipratropium-albuterol  1 ampule Inhalation 4x daily       Vital Signs:   BP (!) 91/52   Pulse 90   Temp 98 °F (36.7 °C) (Oral)   Resp 23   Ht 5' 7.5\" (1.715 m)   Wt 233 lb 14.4 oz (106.1 kg)   SpO2 96%   BMI 36.09 kg/m²      Intake/Output Summary (Last 24 hours) at 7/25/2021 4510  Last data filed at 7/25/2021 0314  Gross per 24 hour   Intake 690 ml   Output 2650 ml   Net -1960 ml        Physical Examination: General appearance - overweight and chronically ill appearing  Mental status - alert, oriented to person, place, and time  Neck - supple, no significant adenopathy, no JVD, or carotid bruits  Chest - decreased air entry noted bilateral.  Bilateral wheezes  Heart - normal rate, regular rhythm, normal S1, S2, no murmurs, rubs, clicks or gallops  Abdomen - soft, nontender, nondistended, no masses or organomegaly  Neurological - alert, oriented, normal speech, no focal findings or movement disorder noted  Musculoskeletal - no muscular tenderness noted  Extremities - no pedal edema noted  Skin - normal coloration and turgor, no rashes, no suspicious skin lesions noted    Data: (All radiographs, tracings, PFTs, and imaging are personally viewed and interpreted unless otherwise noted).           Electronically signed by Estrellita Mendez MD on 7/25/2021 at 7:08 AM

## 2021-07-25 NOTE — PLAN OF CARE
Problem: Impaired respiratory status  Goal: Clear lung sounds  Outcome: Ongoing   Pt has audible wheezes, inspiratory/expiratory prior to nebulizer treatments. SOB with any exertion, thought oxygen demand has not increased. Pt is presently on baseline 4L via NC. Problem: Serum Glucose Level - Abnormal:  Goal: Ability to maintain appropriate glucose levels will improve  Description: Ability to maintain appropriate glucose levels will improve  Outcome: Ongoing   glucose mildly elevated this shift, continues to require approx 2 units coverage with meals. Problem: Pain Control  Goal: Maintain pain level at or below patient's acceptable level (or 5 if patient is unable to determine acceptable level)  Outcome: Ongoing   Pt has c/o back pain this shift, addressed with scheduled lidocaine patches and prn ibuprofen. Pt reports as effective  Problem: Cardiovascular  Goal: Hemodynamic stability  Outcome: Ongoing   patient has been hemodynamically stable this shift, continuing to hold lopressor and lisinopril-hctz d/t low BP. Problem: Respiratory  Goal: O2 Sat > 90%  Outcome: Ongoing   pt SpO2 92-96% via NC today on 4L. Problem: GI  Goal: No bowel complications  Outcome: Ongoing   Bowel sounds active, pt had bowel movement and abd nontender this shift. Denies nausea. Problem: Nutrition  Goal: Optimal nutrition therapy  Outcome: Ongoing   Pt eating moderately during each meal, promoting oral intake. Problem: Skin Integrity/Risk  Goal: No skin breakdown during hospitalization  Outcome: Ongoing   No sxs new skin breakdown so far, pt turns self and transfers from chair to commode often to urinate. Problem: Musculor/Skeletal Functional Status  Goal: Absence of falls  Outcome: Ongoing   No falls this shift, chair alarm on during hourly rounds and call light within reach.  Pt calls for assistance when needing to toilet  Problem:   Goal: Adequate urinary output  Outcome: Ongoing   Output has been appropriate this shift.  Problem: Discharge Planning:  Goal: Discharged to appropriate level of care  Description: Discharged to appropriate level of care  Outcome: Ongoing   Pt plans to discharge ECF, aware and awaiting placement this week. Care plan reviewed with patient. Patient verbalizes understanding of the plan of care and contributes to goal setting.

## 2021-07-25 NOTE — PROGRESS NOTES
[facility]      Patient - Joycelyn Steen   MRN -  [id number]   [facility]   - 1955      Date of Admission -  2021 12:12 AM  Date of evaluation -  2021  Room - -13/013-A   Hospital Day - 7  Consulting - Rochelle Rosario MD Primary Care Physician - SUSAN Rodriguez     Problem List      Active Hospital Problems    Diagnosis Date Noted    Diabetes mellitus [facility]) [E11.9]     Collapse of right lung [J98.11]     Abnormal CT of the chest [R93.89]     COPD (chronic obstructive pulmonary disease) ([facility] [J44.9]     Smoker [F17.200]     Respiratory failure with hypoxia and hypercapnia ([facility] [facility] [J96.91, J96.92] 2021     Reason for Consult    COPD exacerbation and shortness of breath  HPI   History Obtained From: Patient and electronic medical record. Joycelyn Steen is a 73 y.o. female,  PMHx off COPD, CHF,  DM type 2, HTN, HLD, is admitted to the hospital yesterday  for  shortness of breath and possible bronchocopy. Patient transferred from [facility] inpatient unit for bronchoscopy for mucous plug. Patient was being treated for COPD exacerbation for a week at [facility]; intubated and extubated on 21. She admits sputum production described as yellow and thick. Additional symptoms are headache, fatigue, abdominal pain. Denies blood in sputum. Reports associated headache. Denies chest pain, abdominal pain, dysuria, chills, fever, dizziness, lightheadedness     She is having shortness of breath: Yes  Onset: worsening past month  Duration: on 4L nasal canula home oxygen for yrs but 1months SOB is worsening. Functional status prior to beginning of symptoms: with NC Oxygen half block/s on level ground. Current functional capacity on level ground: 0 block/s on level ground. She can climb steps: No  She admits to orthopnea.     Past 24 Hours   -Currently on O2 4LPM.  -Waiting for CXR to be done today to follow her right side pneumothorax  -No events overnight   -Afebrile  -Used BiPAP with 24/8cm H20 last night.  -All other systems reviewed.      PMHx   Past Medical History      Diagnosis Date    CHF (congestive heart failure) (HCC)     COPD (chronic obstructive pulmonary disease) (HCC)     Diabetes mellitus ([facility]     Diastolic dysfunction     Environmental and seasonal allergies     HLD (hyperlipidemia)     Hypertension       Past Surgical History        Procedure Laterality Date    APPENDECTOMY      BRONCHOSCOPY N/A 7/21/2021    BRONCHOSCOPY performed by Jarret Willson MD at [facility]    BRONCHOSCOPY  7/21/2021    BRONCHOSCOPY FLUOROSCOPY performed by Jarret Willson MD at [facility]    BRONCHOSCOPY  7/21/2021    BRONCHOSCOPY/TRANSBRONCHIAL LUNG BIOPSY performed by Jarret Willson MD at [address] [place] [address] Drive      FOOT SURGERY      HERNIA REPAIR       Meds    Current Medications    lidocaine  1 patch Transdermal Daily    Or    lidocaine  2 patch Transdermal Daily    Or    lidocaine  3 patch Transdermal Daily    predniSONE  40 mg Oral Daily    Arformoterol Tartrate  15 mcg Nebulization BID    budesonide  0.5 mg Nebulization BID    insulin regular  8 Units Intravenous Once    alogliptin  12.5 mg Oral Daily    insulin lispro  0-12 Units Subcutaneous TID WC    insulin lispro  0-6 Units Subcutaneous Nightly    metFORMIN  500 mg Oral BID WC    pantoprazole  40 mg Oral QAM AC    sodium chloride flush  5-40 mL Intravenous 2 times per day    enoxaparin  40 mg Subcutaneous Daily    pravastatin  10 mg Oral Daily    aspirin  81 mg Oral Daily    furosemide  20 mg Oral Daily    metoprolol tartrate  25 mg Oral BID    Roflumilast  500 mcg Oral Daily    lisinopril  20 mg Oral Daily    And    hydroCHLOROthiazide  25 mg Oral Daily    ipratropium-albuterol  1 ampule Inhalation 4x daily     traMADol, ibuprofen, acetaminophen, sodium chloride flush, sodium chloride, ipratropium-albuterol, glucose, dextrose, glucagon (rDNA), dextrose  IV Drips/Infusions   sodium chloride 25 mL (07/21/21 0230)    dextrose       Home Medications  Medications Prior to Admission: ipratropium-albuterol (DUONEB) 0.5-2.5 (3) MG/3ML SOLN nebulizer solution, Inhale 3 mLs into the lungs every 4 hours as needed for Shortness of Breath  budesonide (PULMICORT) 0.5 MG/2ML nebulizer suspension, Take 2 mLs by nebulization 2 times daily  albuterol-ipratropium (COMBIVENT RESPIMAT)  MCG/ACT AERS inhaler, Inhale 1 puff into the lungs every 6 hours as needed for Wheezing or Shortness of Breath  albuterol sulfate  (90 Base) MCG/ACT inhaler, Inhale 2 puffs into the lungs every 4 hours as needed for Wheezing or Shortness of Breath  levocetirizine (XYZAL) 5 MG tablet, Take 1 tablet by mouth nightly  fluticasone (FLONASE) 50 MCG/ACT nasal spray, 2 sprays by Nasal route daily  Roflumilast (DALIRESP) 500 MCG tablet, Take 1 tablet by mouth daily  aspirin 81 MG tablet, Take 81 mg by mouth daily  metoprolol tartrate (LOPRESSOR) 25 MG tablet, Take 25 mg by mouth 2 times daily  lisinopril-hydrochlorothiazide (PRINZIDE;ZESTORETIC) 20-25 MG per tablet, Take 1 tablet by mouth daily  metFORMIN (GLUCOPHAGE) 500 MG tablet, Take 500 mg by mouth 2 times daily (with meals) 1,000 mg at supper.   Cholecalciferol (VITAMIN D) 2000 units CAPS capsule, Take by mouth  Cyanocobalamin (B-12 PO), Take by mouth  pravastatin (PRAVACHOL) 10 MG tablet, Take 10 mg by mouth daily  Pseudoephedrine-Guaifenesin (MUCINEX D PO), Take by mouth as needed   Ibuprofen (ADVIL PO), Take by mouth as needed  FUROSEMIDE PO, Take 20 mg by mouth   Sodium Chloride-Sodium Bicarb (AYR SALINE NASAL RINSE) 1.57 g PACK, 1 packet by Nasal route 2 times daily as needed (sinus congestion) (Patient not taking: Reported on 5/26/2021)  Multiple Vitamins-Minerals (THERAPEUTIC MULTIVITAMIN-MINERALS) tablet, Take 1 tablet by mouth daily  Diet    ADULT DIET; Regular; 4 carb choices (60 gm/meal)  Allergies    Excedrin extra strength [aspirin-acetaminophen-caffeine], Norco [hydrocodone-acetaminophen], and Tylenol [acetaminophen]  Social History     Social History     Socioeconomic History    Marital status:      Spouse name: Not on file    Number of children: Not on file    Years of education: Not on file    Highest education level: Not on file   Occupational History    Not on file   Tobacco Use    Smoking status: Current Some Day Smoker     Packs/day: 1.00     Years: 42.00     Pack years: 42.00     Start date: 3/22/1978    Smokeless tobacco: Never Used    Tobacco comment: 1 pack every 2-3 weeks   Substance and Sexual Activity    Alcohol use: No    Drug use: No    Sexual activity: Not on file   Other Topics Concern    Not on file   Social History Narrative    Not on file     Social Determinants of Health     Financial Resource Strain:     Difficulty of Paying Living Expenses:    Food Insecurity:     Worried About Running Out of Food in the Last Year:     Ran Out of Food in the Last Year:    Transportation Needs:     Lack of Transportation (Medical):  Lack of Transportation (Non-Medical):    Physical Activity:     Days of Exercise per Week:     Minutes of Exercise per Session:    Stress:     Feeling of Stress :    Social Connections:     Frequency of Communication with Friends and Family:     Frequency of Social Gatherings with Friends and Family:     Attends Buddhism Services:     Active Member of Clubs or Organizations:     Attends Club or Organization Meetings:     Marital Status:    Intimate Partner Violence:     Fear of Current or Ex-Partner:     Emotionally Abused:     Physically Abused:     Sexually Abused:      Family History    No family history on file. Sleep History    Never diagnosed with sleep apnea in the past.  Occupational history   Occupation:  She is current working: No  Type of profession: retired. History of tobacco smoking:Yes  Amount of tobacco smokin.0 PPD. Years of tobacco smokin. Quit smoking: Yes. Quit year:few yrs ago   Current smoker: No.       History of recreational or IV drug use in the past:NO     History of exposure to coal mines/coal dust: NO  History of exposure to foundry dust/welding: NO  History of exposure to quarry/silica/sandblasting: NO  History of exposure to asbestos/working with breaks/ships: NO  History of exposure to farm dust: NO  History of recent travel to long distances: NO  History of exposure to birds, pigeons, or chickens in the past:NO  History of pulmonary embolism in the past: No     denies       History of DVT in the past:No             Vitals     height is 5' 7.5\" (1.715 m) and weight is 233 lb 14.4 oz (106.1 kg). Her oral temperature is 98.4 °F (36.9 °C). Her blood pressure is 94/52 (abnormal) and her pulse is 94. Her respiration is 20 and oxygen saturation is 92%. Body mass index is 36.09 kg/m². SUPPLEMENTAL O2: O2 Flow Rate (L/min): 4 L/min     I/O        Intake/Output Summary (Last 24 hours) at 2021 1454  Last data filed at 2021 1407  Gross per 24 hour   Intake 910 ml   Output 1375 ml   Net -465 ml     I/O last 3 completed shifts: In: 690 [P.O.:690]  Out: 2650 [Urine:2650]   Patient Vitals for the past 96 hrs (Last 3 readings):   Weight   21 0314 233 lb 14.4 oz (106.1 kg)   21 0315 230 lb 11.2 oz (104.6 kg)   21 0430 227 lb 3.2 oz (103.1 kg)       Exam   Constitutional: Patient appears in no distress on 4LPM via nasal cannula. Mouth/Throat: Oropharynx is clear and moist.  No oral thrush. Neck: Neck supple. Cardiovascular: S1 and S2 heard. No murmurs. Pulmonary/Chest: Bilateral air entry present. Good breath sounds on both sides, diminished at bases. Bilateral occasiona expiratory wheezes. No rales. Abdominal: Soft. No tenderness.    Extremities: Patient exhibits no edema. Neurological: Patient is awake and alert. Labs  - Old records and notes have been reviewed in [facility]   ABG  Lab Results   Component Value Date    PH 7.37 07/23/2021    PO2 65 07/23/2021    PCO2 80 07/23/2021    HCO3 46 07/23/2021    O2SAT 90 07/23/2021     Lab Results   Component Value Date    IFIO2 4 07/23/2021    MODE BiLevel 07/18/2021    SETPEEP 10.0 07/19/2021     CBC  Recent Labs     07/23/21  1056   WBC 12.4*   RBC 3.26*   HGB 10.2*   HCT 34.0*   .3*   MCH 31.3   MCHC 30.0*      MPV 11.9      BMP  Recent Labs     07/23/21  1115      K 3.8   CL 90*   CO2 42*   BUN 21   CREATININE 0.7   GLUCOSE 187*   CALCIUM 9.3     LFT  No results for input(s): AST, ALT, ALB, BILITOT, ALKPHOS, LIPASE in the last 72 hours. Invalid input(s): AMYLASE  TROP  No results found for: TROPONINT  BNP  No results for input(s): BNP in the last 72 hours. Lactic Acid  No results for input(s): LACTA in the last 72 hours. INR  No results for input(s): INR, PROTIME in the last 72 hours. PTT  No results for input(s): APTT in the last 72 hours. Glucose  Recent Labs     07/24/21 1957 07/25/21  0829 07/25/21  1127   POCGLU 308* 138* 174*     UA No results for input(s): SPECGRAV, PHUR, COLORU, CLARITYU, MUCUS, PROTEINU, BLOODU, RBCUA, WBCUA, BACTERIA, NITRU, GLUCOSEU, BILIRUBINUR, UROBILINOGEN, KETUA, LABCAST, LABCASTTY, AMORPHOS in the last 72 hours. Invalid input(s): CRYSTALS.     PFTs     11/4/2020        Sleep studies   Pt stated that she had sleep study a yr ago and never diagnosed with sleep apnea    Cultures    Urine culture: gram (+) cocci chains  VRE (-)  Blood culture: no growth; prelim  COVID-19, Rapid [[id number]] Collected: 07/19/21 1520   Order Status: Completed Specimen: Nasopharyngeal Swab Updated: 07/19/21 1540    SARS-CoV-2, NAAT NOT DETECTED    Comment: Rapid NAAT:   Negative results should be treated as presumptive and,   if inconsistent with clinical signs and symptoms 7/18/2021  Findings:   No pleural effusion. Heart size normal.   No acute fracture. Impression   Impression:   There is increased groundglass alveolar opacity in the right heart margin    related to the medial segment of the right middle lobe. The lungs are    otherwise clear. The right middle lobe opacity is consistent with early acute middle lobe    alveolar pulmonary infection. 7/19/2021   CTA THORAX / PULMONARY EMBOLUS STUDY:   1. No pulmonary emboli are seen. Questionable pulmonary arterial hypertension. 2. Subtotal collapse of the right middle lobe which could be due to mucous plug but cannot exclude other endobronchial pathology. No distinct mass lesion is seen, however. CT Scans 10/22/2019  CT Lung screening. Negative for acute changes and pulmonary nodules. Nocturnal pulse oximetry study results:     Nocturnal pulse oximetry study done on: 07/22/21  The study was done on 4LPM via nasal cannula during night time    Time with SpO2 <89%: 20minutes. Plan:  Patient qualify for home BiPAP    Assessment   -Acute on chronic hypoxic and hypercapnic respiratory failure secondary to COPD exacerbation- Improving.   -Right middle lobe collapse due to mucus plug- S/p Removal of mucus plug.  -Right middle lobe fungal pneumonia. Patient's bronc washings are growing mold. -Community acquired pneumonia S/p Therapy with antibiotics  -COPD exacerbation. GOLD 4 stage- She qualified for home BiPAP with current settings of 24/8cm H20  -DM type 2.     Plan   -Continue BiPAP 24/8 cm H2O- to be used with daytime napping and at bedtime- Will prescribe home BiPAP with these settings  -Continue Brovana, and Duonebs QID- Metaneb  -Hold off on Pulmicort nebulization due to mold infection of right middle lobe  -Stop prednisone due to mild infection of the lung  -We will start patient on voriconazole by starting at 6 mg/kg IV piggyback every 12 hours.  -Infectious disease consultation with  Michelle Major MD for further management of right middle lobe fungal pneumonia. -VTE prophylaxis: Lovenox/SCDs  -Acapella/IS discussed and encouraged use   -Follow CXR 2-view ordered today by Dr. Arias Teague to follow her right side pneumothorax.  -Liver function tests to follow her elevated ALT level note on 20 July 2021  -Increase the dose of Daliresp 250 mcg p.o. daily-Due to it's interaction with voriconazole. -Drug interaction check with voriconazole, Pravachol and alogliptin-no drug interaction was noted per [employer]  -Will keep appt with Lucía Brown CNP on 9/1/21 at 3:00 PM- with CXR 2-view prior to appt   -Surgical pathology reviewed (-) malignant cells   -Cytology - (-) malignant cells   -DME for BiPAP 24/8 cm H2O with 4LPM bleed in   Continue PT OT  Meds and orders reviewed  Questions and concerns addressed.        Electronically signed by   Natalie Mcfarland MD on 7/25/2021 at 2:54 PM

## 2021-07-25 NOTE — PLAN OF CARE
Problem: Impaired respiratory status  Goal: Clear lung sounds  7/25/2021 1342 by Bello Pollack RCP  Outcome: Ongoing  Continue therapy as order to improve breath sounds/aeration.

## 2021-07-26 ENCOUNTER — APPOINTMENT (OUTPATIENT)
Dept: GENERAL RADIOLOGY | Age: 66
DRG: 871 | End: 2021-07-26
Attending: INTERNAL MEDICINE
Payer: COMMERCIAL

## 2021-07-26 LAB
FUNGUS SMEAR: ABNORMAL
GLUCOSE BLD-MCNC: 111 MG/DL (ref 70–108)
GLUCOSE BLD-MCNC: 144 MG/DL (ref 70–108)
GLUCOSE BLD-MCNC: 169 MG/DL (ref 70–108)
GLUCOSE BLD-MCNC: 170 MG/DL (ref 70–108)
GRAM STAIN RESULT: ABNORMAL
ORGANISM: ABNORMAL
ORGANISM: ABNORMAL
RESPIRATORY CULTURE: ABNORMAL
RESPIRATORY CULTURE: ABNORMAL

## 2021-07-26 PROCEDURE — 97530 THERAPEUTIC ACTIVITIES: CPT

## 2021-07-26 PROCEDURE — 94761 N-INVAS EAR/PLS OXIMETRY MLT: CPT

## 2021-07-26 PROCEDURE — 71046 X-RAY EXAM CHEST 2 VIEWS: CPT

## 2021-07-26 PROCEDURE — 2060000000 HC ICU INTERMEDIATE R&B

## 2021-07-26 PROCEDURE — 94640 AIRWAY INHALATION TREATMENT: CPT

## 2021-07-26 PROCEDURE — 94660 CPAP INITIATION&MGMT: CPT

## 2021-07-26 PROCEDURE — 6370000000 HC RX 637 (ALT 250 FOR IP): Performed by: STUDENT IN AN ORGANIZED HEALTH CARE EDUCATION/TRAINING PROGRAM

## 2021-07-26 PROCEDURE — 6360000002 HC RX W HCPCS: Performed by: NURSE PRACTITIONER

## 2021-07-26 PROCEDURE — 99232 SBSQ HOSP IP/OBS MODERATE 35: CPT | Performed by: INTERNAL MEDICINE

## 2021-07-26 PROCEDURE — 36415 COLL VENOUS BLD VENIPUNCTURE: CPT

## 2021-07-26 PROCEDURE — 97535 SELF CARE MNGMENT TRAINING: CPT

## 2021-07-26 PROCEDURE — 6370000000 HC RX 637 (ALT 250 FOR IP): Performed by: INTERNAL MEDICINE

## 2021-07-26 PROCEDURE — 6360000002 HC RX W HCPCS: Performed by: INTERNAL MEDICINE

## 2021-07-26 PROCEDURE — 2580000003 HC RX 258: Performed by: INTERNAL MEDICINE

## 2021-07-26 PROCEDURE — 87305 ASPERGILLUS AG IA: CPT

## 2021-07-26 PROCEDURE — 97116 GAIT TRAINING THERAPY: CPT

## 2021-07-26 PROCEDURE — 2580000003 HC RX 258: Performed by: STUDENT IN AN ORGANIZED HEALTH CARE EDUCATION/TRAINING PROGRAM

## 2021-07-26 PROCEDURE — 2700000000 HC OXYGEN THERAPY PER DAY

## 2021-07-26 PROCEDURE — 82948 REAGENT STRIP/BLOOD GLUCOSE: CPT

## 2021-07-26 PROCEDURE — 94669 MECHANICAL CHEST WALL OSCILL: CPT

## 2021-07-26 PROCEDURE — 97110 THERAPEUTIC EXERCISES: CPT

## 2021-07-26 PROCEDURE — 6360000002 HC RX W HCPCS: Performed by: STUDENT IN AN ORGANIZED HEALTH CARE EDUCATION/TRAINING PROGRAM

## 2021-07-26 PROCEDURE — APPSS30 APP SPLIT SHARED TIME 16-30 MINUTES: Performed by: NURSE PRACTITIONER

## 2021-07-26 RX ORDER — VORICONAZOLE 200 MG/1
200 TABLET, FILM COATED ORAL EVERY 12 HOURS SCHEDULED
Status: DISCONTINUED | OUTPATIENT
Start: 2021-07-26 | End: 2021-07-27 | Stop reason: HOSPADM

## 2021-07-26 RX ADMIN — PRAVASTATIN SODIUM 10 MG: 10 TABLET ORAL at 10:34

## 2021-07-26 RX ADMIN — METOPROLOL TARTRATE 25 MG: 25 TABLET, FILM COATED ORAL at 10:36

## 2021-07-26 RX ADMIN — INSULIN LISPRO 2 UNITS: 100 INJECTION, SOLUTION INTRAVENOUS; SUBCUTANEOUS at 10:38

## 2021-07-26 RX ADMIN — ASPIRIN 81 MG: 81 TABLET, CHEWABLE ORAL at 10:35

## 2021-07-26 RX ADMIN — IPRATROPIUM BROMIDE AND ALBUTEROL SULFATE 1 AMPULE: .5; 3 SOLUTION RESPIRATORY (INHALATION) at 18:02

## 2021-07-26 RX ADMIN — PANTOPRAZOLE SODIUM 40 MG: 40 TABLET, DELAYED RELEASE ORAL at 05:05

## 2021-07-26 RX ADMIN — VORICONAZOLE 200 MG: 200 TABLET ORAL at 15:36

## 2021-07-26 RX ADMIN — TRAMADOL HYDROCHLORIDE 50 MG: 50 TABLET ORAL at 12:42

## 2021-07-26 RX ADMIN — IPRATROPIUM BROMIDE AND ALBUTEROL SULFATE 1 AMPULE: .5; 3 SOLUTION RESPIRATORY (INHALATION) at 10:06

## 2021-07-26 RX ADMIN — METFORMIN HYDROCHLORIDE 500 MG: 500 TABLET ORAL at 10:36

## 2021-07-26 RX ADMIN — VORICONAZOLE 635 MG: 200 INJECTION, POWDER, LYOPHILIZED, FOR SOLUTION INTRAVENOUS at 05:05

## 2021-07-26 RX ADMIN — TRAMADOL HYDROCHLORIDE 50 MG: 50 TABLET ORAL at 18:19

## 2021-07-26 RX ADMIN — ALOGLIPTIN 12.5 MG: 12.5 TABLET, FILM COATED ORAL at 10:35

## 2021-07-26 RX ADMIN — SODIUM CHLORIDE, PRESERVATIVE FREE 10 ML: 5 INJECTION INTRAVENOUS at 10:37

## 2021-07-26 RX ADMIN — HYDROCHLOROTHIAZIDE 25 MG: 25 TABLET ORAL at 10:34

## 2021-07-26 RX ADMIN — SODIUM CHLORIDE, PRESERVATIVE FREE 10 ML: 5 INJECTION INTRAVENOUS at 20:36

## 2021-07-26 RX ADMIN — ROFLUMILAST 250 MCG: 500 TABLET ORAL at 10:48

## 2021-07-26 RX ADMIN — IBUPROFEN 400 MG: 400 TABLET, FILM COATED ORAL at 19:27

## 2021-07-26 RX ADMIN — INSULIN LISPRO 2 UNITS: 100 INJECTION, SOLUTION INTRAVENOUS; SUBCUTANEOUS at 13:08

## 2021-07-26 RX ADMIN — ARFORMOTEROL TARTRATE 15 MCG: 15 SOLUTION RESPIRATORY (INHALATION) at 10:17

## 2021-07-26 RX ADMIN — ENOXAPARIN SODIUM 40 MG: 40 INJECTION, SOLUTION INTRAVENOUS; SUBCUTANEOUS at 10:36

## 2021-07-26 RX ADMIN — IPRATROPIUM BROMIDE AND ALBUTEROL SULFATE 1 AMPULE: .5; 3 SOLUTION RESPIRATORY (INHALATION) at 13:43

## 2021-07-26 RX ADMIN — LISINOPRIL 20 MG: 20 TABLET ORAL at 10:34

## 2021-07-26 RX ADMIN — TRAMADOL HYDROCHLORIDE 50 MG: 50 TABLET ORAL at 05:05

## 2021-07-26 RX ADMIN — FUROSEMIDE 20 MG: 20 TABLET ORAL at 10:34

## 2021-07-26 RX ADMIN — IBUPROFEN 400 MG: 400 TABLET, FILM COATED ORAL at 10:35

## 2021-07-26 RX ADMIN — METFORMIN HYDROCHLORIDE 500 MG: 500 TABLET ORAL at 17:53

## 2021-07-26 ASSESSMENT — PAIN SCALES - GENERAL
PAINLEVEL_OUTOF10: 8
PAINLEVEL_OUTOF10: 3
PAINLEVEL_OUTOF10: 8
PAINLEVEL_OUTOF10: 4
PAINLEVEL_OUTOF10: 3
PAINLEVEL_OUTOF10: 0

## 2021-07-26 ASSESSMENT — PAIN DESCRIPTION - PAIN TYPE: TYPE: ACUTE PAIN

## 2021-07-26 NOTE — PROGRESS NOTES
[facility] ICU STEPDOWN TELEMETRY 4K  Occupational Therapy  Daily Note  Time:   Time In: 0175  Time Out: 1153  Timed Code Treatment Minutes: 40 Minutes  Minutes: 40          Date: 2021  Patient Name: Shayna Aldridge,   Gender: female      [facility]: [facility]  MRN: [id number]  : 1955  (73 y.o.)  Referring Practitioner: Siomara Braswell DO  Diagnosis: Respiratory failure  Additional Pertinent Hx: Per H&P, Stephanie Workman is a 73 y.o. female,  PMHx off COPD, CHF,  DM type 2, HTN, HLD, is admitted to the hospital yesterday  for  shortness of breath and possible bronchocopy. Patient transferred from [facility] inpatient unit for bronchoscopy for mucous plug. Patient was being treated for COPD exacerbation for a week at [facility]; intubated and extubated on 21. She admits sputum production described as yellow and thick. Additional symptoms are headache, fatigue, abdominal pain. Denies blood in sputum. Reports associated headache. Denies chest pain, abdominal pain, dysuria, chills, fever, dizziness, lightheadedness. \"    Restrictions/Precautions:  Restrictions/Precautions: General Precautions, Fall Risk  Position Activity Restriction  Other position/activity restrictions: on 5L O2      SUBJECTIVE: Nurse [facility] approved session, Up in chair upon arrival, agreeable to OT session    PAIN: 5/10: headach    Vitals: Vitals not assessed per clinical judgement, see nursing flowsheet    COGNITION: WFL    ADL:   Lower Extremity Dressing: with set-up. able to wenceslao/doff B slipper socks sitting in bedside chair  [address] for hygiene  Toilet Transfer: [address] BSC . BALANCE:  Standing Balance: Contact Guard Assistance. stood x 2 minutes with B UE support    BED MOBILITY:  Not Tested    TRANSFERS:  Sit to Stand:  Contact Guard Assistance. bedside chair  Stand to Sit: [address]     Stand Pivot: Contact Guard Assistance. bedside chair<>BSC    ADDITIONAL ACTIVITIES:  Guided patient through BUE AROM this date x10 reps x1 set in chair in order to increase BUE strength and improve activity tolerance for ADLs and homemaking tasks. Educated patient on uses of options for grocery  due to food that she would like to have and easier meals that she would be able to make at home, voiced understanding and appreciation      ASSESSMENT:     Activity Tolerance:  Patient tolerance of  treatment: good. Discharge Recommendations: Continue to assess pending progress, Subacute/Skilled Nursing Facility   Equipment Recommendations: Equipment Needed: No  Other: Monitor needs pending progress. Plan: Times per week: 5x  Current Treatment Recommendations: ROM, Functional Mobility Training, Endurance Training, Balance Training, Safety Education & Training, Self-Care / ADL, Patient/Caregiver Education & Training, Home Management Training, Equipment Evaluation, Education, & procurement    Patient Education  Patient Education: ADL's and IADL's    Goals  Short term goals  Time Frame for Short term goals: Until discharge  Short term goal 1: Pt will complete BUE AROM exercises with min vcs for technique to increase indep and endurance with all self cares. Short term goal 2: Pt will complete standing tolerance x 2 minutes with S while O2 stats remain above 90% to increase indep with toileting. Short term goal 3: Pt will complete BADL routine with S while demonstrating EC techniques to increase endurance within home environment. Short term goal 4: Pt will complete short distances with S while O2 stats remain above 90% to increase indep and endurance within home environment. Following session, patient left in safe position with all fall risk precautions in place.

## 2021-07-26 NOTE — PROGRESS NOTES
Pharmacy Consult      Shannan Hardy is a 73 y.o. female for whom pharmacy has been consulted to transition voriconazole from IV to PO. Patient Active Problem List   Diagnosis    Respiratory failure with hypoxia and hypercapnia (HCC)    COPD (chronic obstructive pulmonary disease) ([facility])    Smoker    Collapse of right lung    Abnormal CT of the chest    Diabetes mellitus ([facility] [address]       Allergies:  Excedrin extra strength [aspirin-acetaminophen-caffeine], Norco [hydrocodone-acetaminophen], and Tylenol [acetaminophen]     Recent Labs     07/25/21  1805   CREATININE 0.9       Ht/Wt:   Ht Readings from Last 1 Encounters:   07/18/21 5' 7.5\" (1.715 m)        Wt Readings from Last 1 Encounters:   07/26/21 227 lb 1.6 oz (103 kg)         Estimated Creatinine Clearance: 78 mL/min (based on SCr of 0.9 mg/dL). Assessment/Plan:    Patient received voriconazole loading doses of 6 mg/kg (635 mg) q51d6lhsem  Patient was switched to voriconazole 200 mg PO BID    Thank you for the consult. Will continue to follow.

## 2021-07-26 NOTE — PLAN OF CARE
Problem: Impaired respiratory status  Goal: Clear lung sounds  7/26/2021 0040 by Ricardo Klein RN  Outcome: Ongoing     Problem: Serum Glucose Level - Abnormal:  Goal: Ability to maintain appropriate glucose levels will improve  Description: Ability to maintain appropriate glucose levels will improve  Outcome: Ongoing  Note: Patient glucose continues to be intermittently high. Treating with insulin. Problem: Pain Control  Goal: Maintain pain level at or below patient's acceptable level (or 5 if patient is unable to determine acceptable level)  Outcome: Ongoing  Flowsheets (Taken 7/25/2021 8030 by Maite Rodrigez RN)  Patient's Stated Pain Goal: 2  Note: Pain Assessment: 0-10  Pain Level: 3   Patient's Stated Pain Goal: 2   Is pain goal met at this time? Yes     Non-Pharmaceutical Pain Intervention(s): Distraction, Rest, Repositioned       Problem: Cardiovascular  Goal: No DVT, peripheral vascular complications  Outcome: Ongoing     Problem: Cardiovascular  Goal: Hemodynamic stability  Outcome: Ongoing  Note: Blood pressure within normal limits this shift. Patient displays no bleeding abnormalities. Monitoring labs frequently for any abnormalities. Capillary refill less than 3 seconds. Skin turgor less than 3 seconds. Pulses palpable. Problem: Respiratory  Goal: No pulmonary complications  Outcome: Ongoing     Problem: Respiratory  Goal: O2 Sat > 90%  Outcome: Ongoing     Problem: Respiratory  Goal: Supplemental O2 requirements decreased  Outcome: Ongoing     Problem: GI  Goal: No bowel complications  Outcome: Ongoing  Note: Patient has multiple bowel movements this admission. Problem: Nutrition  Goal: Optimal nutrition therapy  Outcome: Ongoing  Note: Patient tolerating PO diet. Problem: Skin Integrity/Risk  Goal: No skin breakdown during hospitalization  Outcome: Ongoing  Note: Patient turns self independently. Patient taught to change position frequently to prevent breakdown.  No redness noted on pressure points such as elbows, back of head, heels, shoulder blades, or coccyx at this time. Will continue to monitor. Problem: Musculor/Skeletal Functional Status  Goal: Absence of falls  Outcome: Ongoing  Note: Patient alert and oriented x4. Bed alarmed armed. Bed wheels locked. Bedside table in reach. Patient up with assistance when ambulating. Patient verbalizes and demonstrates the use of the call light. Hourly rounding being completed. Problem: OXYGENATION/RESPIRATORY FUNCTION  Goal: Patient will maintain patent airway  Outcome: Ongoing     Problem: OXYGENATION/RESPIRATORY FUNCTION  Goal: Patient will achieve/maintain normal respiratory rate/effort  Description: Respiratory rate and effort will be within normal limits for the patient  Outcome: Ongoing     Problem:   Goal: Adequate urinary output  Outcome: Ongoing  Note: Adequate urine output. Problem: Discharge Planning:  Goal: Discharged to appropriate level of care  Description: Discharged to appropriate level of care  Outcome: Ongoing  Note: Patient planning to go to [facility] at discharge. Problem: Musculor/Skeletal Functional Status  Goal: Highest potential functional level  Outcome: Ongoing   Care plan reviewed with patient. Patient verbalize understanding of the plan of care and contribute to goal setting.

## 2021-07-26 NOTE — CONSULTS
CONSULTATION NOTE :ID       Patient - Peyton Barker,  Age - 73 y.o.    - 1955      Room Number - 4K-13/013-A   N -  [id number]   [facility]  Date of Admission -  2021 12:12 AM  Patient's PCP: SUSAN Elliott     Requesting Physician: Rafy Schultz MD    REASON FOR CONSULTATION   Further management of fungal right middle lobe pneumonia, bronc washings growing mold   CHIEF COMPLAINT   Shortness of breath     HISTORY OF PRESENT ILLNESS       This is a very pleasant 73 y.o. female who was admitted to the hospital with a chief complaints of shortness of breath. Patient was transferred from [facility] to [facility] for bronchoscopy with anticipation of removal of mucous plug. 21 patient was admitted and treated for COPD exacerbation and shortness of breath at [facility]. Patient was transferred to [facility], 21. Patient had bronchoscopy 21 with minimal mucus removed. Patient admits to increased thick cream colored/ yellow sputum and shortness of breath. Patient is a former smoker and stopped 18 days ago. Patient denies hemoptysis or similar events like this. Patient endorses chest pain that radiates to her back. Patient states her house was built in  and no other obvious mold exposures.          PAST MEDICAL  HISTORY       Past Medical History:   Diagnosis Date    CHF (congestive heart failure) (HCC)     COPD (chronic obstructive pulmonary disease) (HCC)     Diabetes mellitus (HCC)     Diastolic dysfunction     Environmental and seasonal allergies     HLD (hyperlipidemia)     Hypertension        PAST SURGICAL HISTORY     Past Surgical History:   Procedure Laterality Date    APPENDECTOMY      BRONCHOSCOPY N/A 2021    BRONCHOSCOPY performed by Yovany Ramirez MD at [facility]    BRONCHOSCOPY  2021    BRONCHOSCOPY FLUOROSCOPY performed by Yovany Ramirez MD at [facility]    BRONCHOSCOPY  7/21/2021    BRONCHOSCOPY/TRANSBRONCHIAL LUNG BIOPSY performed by Sue Sanon MD at [address]       HERNIA REPAIR           MEDICATIONS:       Scheduled Meds:   Roflumilast  250 mcg Oral Daily    potassium replacement protocol   Other RX Placeholder    voriconazole  4 mg/kg Intravenous Q12H    lidocaine  1 patch Transdermal Daily    Or    lidocaine  2 patch Transdermal Daily    Or    lidocaine  3 patch Transdermal Daily    Arformoterol Tartrate  15 mcg Nebulization BID    [Held by provider] budesonide  0.5 mg Nebulization BID    insulin regular  8 Units Intravenous Once    alogliptin  12.5 mg Oral Daily    insulin lispro  0-12 Units Subcutaneous TID WC    insulin lispro  0-6 Units Subcutaneous Nightly    metFORMIN  500 mg Oral BID WC    pantoprazole  40 mg Oral QAM AC    sodium chloride flush  5-40 mL Intravenous 2 times per day    enoxaparin  40 mg Subcutaneous Daily    pravastatin  10 mg Oral Daily    aspirin  81 mg Oral Daily    furosemide  20 mg Oral Daily    metoprolol tartrate  25 mg Oral BID    lisinopril  20 mg Oral Daily    And    hydroCHLOROthiazide  25 mg Oral Daily    ipratropium-albuterol  1 ampule Inhalation 4x daily     Continuous Infusions:   sodium chloride 25 mL (07/21/21 0230)    dextrose       PRN Meds:magnesium sulfate, traMADol, ibuprofen, acetaminophen, sodium chloride flush, sodium chloride, ipratropium-albuterol, glucose, dextrose, glucagon (rDNA), dextrose  Allergies:   ALLERGIES:    Excedrin extra strength [aspirin-acetaminophen-caffeine], Norco [hydrocodone-acetaminophen], and Tylenol [acetaminophen]        SOCIAL HISTORY:     TOBACCO:   reports that she has been smoking. She started smoking about 43 years ago. She has a 42.00 pack-year smoking history. She has never used smokeless tobacco.     ETOH:   reports no history of alcohol use. Patient currently lives at home. FAMILY HISTORY:   Non contributory    REVIEW OF SYSTEMS:     Constitutional: no fever, no night sweats, no fatigue, no weight loss. Head: no headache , no head injury, no migranes. Eye: no eye discharge, blurring of vision, no double vision,no eye pain. Ears: no hearing difficulty, no tinnitus  Mouth/throat: no ulceration, dental caries , dysphagia, no hoarseness and voice change  Respiratory: Admits to productive cough and chest pain that radiates to her back. Patient also has shortness of breath  CVS: no palpitation,   GI: no abdominal pain, no nausea , no vomiting, no constipation,no diarrhea. MOIZ: no dysuria, frequency and urgency, no hematuria, no kidney stones  Musculoskeletal: no joint pain, swelling , stiffness,  Endocrine: no polyuria, polydipsia, no cold or heat intolerance  Hematology: no anemia, no easy brusing or bleeding, no hx of clotting disorder  Dermatology: no skin rash, no skin lesions, no pruritis,  Neurological:no headaches,no dizziness, no seizure, no numbness. Psychiatry: no depression, no anxiety,no panic attacks, no suicide ideation    PHYSICAL EXAM:     /68   Pulse 95   Temp 98.3 °F (36.8 °C) (Oral)   Resp 16   Ht 5' 7.5\" (1.715 m)   Wt 227 lb 1.6 oz (103 kg)   SpO2 95%   BMI 35.04 kg/m²   General apperance: Awake, alert, no acute distress   HEENT: pink conjunctiva, unicteric sclera, moist oral mucosa. Chest:  Diminished breath sounds bilaterally, with rhonchi notably on right   Cardiovascular: Tachycardic rate with regular rhythm,S1S2, no murmur or gallop. Abdomen:  Soft, non tender to palpation. Extremities: Trace pedal edema bilaterally,   Skin:  Warm and dry. CNS Alert and oriented x3.  No focal deficits noted         LABS:     CBC:   Recent Labs     07/23/21  1056   WBC 12.4*   HGB 10.2*        BMP:    Recent Labs     07/23/21  1115 07/25/21  1805    134*   K 3.8 4.6   CL 90* 87*   CO2 42* 36*   BUN 21 26*   CREATININE 0.7 0.9   GLUCOSE 187* 457*     Calcium:  Recent Labs     07/25/21  1805   CALCIUM 8.8     Ionized Calcium:No results for input(s): IONCA in the last 72 hours. Magnesium:  Recent Labs     07/25/21  1805   MG 1.6     Phosphorus:No results for input(s): PHOS in the last 72 hours. BNP:No results for input(s): BNP in the last 72 hours. Glucose:  Recent Labs     07/25/21  1636 07/25/21 2008 07/26/21  0748   POCGLU 389* 327* 170*     HgbA1C: No results for input(s): LABA1C in the last 72 hours. INR: No results for input(s): INR in the last 72 hours. Hepatic:   Recent Labs     07/25/21  1805   ALKPHOS 69   ALT 42   AST 18   PROT 5.6*   BILITOT 0.2*   BILIDIR <0.2   LABALBU 2.8*     Micro:   Lab Results   Component Value Date    BC No growth-preliminary No growth  07/18/2021       Problem list of patient      Patient Active Problem List   Diagnosis Code    Respiratory failure with hypoxia and hypercapnia ([facility]) J96.91, J96.92    COPD (chronic obstructive pulmonary disease) ([facility]) J44.9    Smoker F17.200    Collapse of right lung J98.11    Abnormal CT of the chest R93.89    Diabetes mellitus ([facility] [address] E11.9       Impression and Recommendation:   Pneumonia: Bronchial washing cultures positive for mold. Likely aspergillus. Pharmacy consulted to transition patient to PO Voriconazole. Aspergillus Glacto AG Assay ordered   Tobacco Abuse: patient stopped smoking 18 days ago. Smoking cessation further advised with patient   COPD Exacerbation: Per primary   Uncontrolled DM: Per primary     Continue current therapy per primary. Infection Control:   Standard precautions    Discharge Planning (Coordination of out patient care):   Plans to go to [address] [place] [address] at discharge. Case was discussed with Infectious Disease Attending, Dr. Lico Ramos. Thank you Brynn Almonte MD for allowing me to participate in this patient's care.     Jessica Whiteside DO,   7/26/2021 9:33 AM   Patient was seen and examined face-to-face by me  The chart, progress notes, labs and radiographs were reviewed. Case discussed with Dr Julisa Packer. Questions and concerns were addressed.      Jenifer Ovalles MD  7/26/2021

## 2021-07-26 NOTE — CARE COORDINATION
7/26/21, 11:00 AM EDT    DISCHARGE PLANNING EVALUATION      ROSCOE spoke with Evelyn Buckner at [facility] and she took message for [employer] in admission to return SW call. Update:  ROSCOE received a call from Krystal at [facility] and she reported that patient has been approved and she asked SW for bipap settings. ROSCOE faxed bipap settings to [employer]. Update:  ROSCOE called [facility] and spoke with Geovanna Davis and left a message for [employer]. ROSCOE notified her of anti-fungal patient will need at discharge. Vfend 200mg twice daily with a stop date unknown at this time. Geovanna Davis will have [employer] SW back.

## 2021-07-26 NOTE — PROGRESS NOTES
[facility]  INPATIENT PHYSICAL THERAPY  DAILY NOTE  STR[facility] ICU STEPDOWN TELEMETRY 4K - 4K-13/013-A      Time In: 926  Time Out: 6589  Timed Code Treatment Minutes: 23 Minutes  Minutes: 23          Date: 2021  Patient Name: Alix Street,  Gender:  female        MRN: [id number]  : 1955  (73 y.o.)     Referring Practitioner: Dr. James Neal  Diagnosis: respiratory failure  Additional Pertinent Hx: Per H&P, Gisell Florentino is a 73 y.o. female,  PMHx off COPD, CHF,  DM type 2, HTN, HLD, is admitted to the hospital yesterday  for  shortness of breath and possible bronchocopy. Patient transferred from [facility] inpatient unit for bronchoscopy for mucous plug. Patient was being treated for COPD exacerbation for a week at [facility]; intubated and extubated on 21. She admits sputum production described as yellow and thick. Additional symptoms are headache, fatigue, abdominal pain. Denies blood in sputum. Reports associated headache. Denies chest pain, abdominal pain, dysuria, chills, fever, dizziness, lightheadedness. \"     Prior Level of Function:  Lives With: Spouse, Son  Type of Home: House  Home Layout: One level  Home Access: Stairs to enter without rails  Entrance Stairs - Number of Steps: 1 ELLIE (~1 inch)  Home Equipment: Rolling walker, 4 wheeled walker, Cane (no AD used PTA)   Bathroom Shower/Tub: Walk-in shower, Shower chair with back  Bathroom Toilet: Standard  Bathroom Accessibility: Accessible    ADL Assistance: Independent  Homemaking Assistance: Needs assistance (Spouse and son complete)  Ambulation Assistance: Independent  Transfer Assistance: Independent  Additional Comments: on 4L Home O2    Restrictions/Precautions:  Restrictions/Precautions: General Precautions, Fall Risk  Position Activity Restriction  Other position/activity restrictions: on O2  SUBJECTIVE:  Pt in bed on arrival she reported that she was just back from xray and initially declined therapy- however she then asked to use bathroom and agreeable to get up to chair     PAIN: 0/10:       Vitals: Oxygen: Pt on 4L O2 her sats varied from 87-92% she required several rest breaks and cues for deep breathing- nursing notified of pts drop in O2 sats with mobility     OBJECTIVE:  Bed Mobility:  Supine to Sit: Stand By Assistance, ** However pt only able to tolerate HOB all the way up due to being SOB - she was alerady up in long sitting - once sitting she needed a rest break due to drop in O2 sats and SOB     Transfers:  Sit to Stand: Stand By Assistance  Stand to Sit:Stand By Assistance  Stand pivot transfer with CGA w/o use of walker   Ambulation:  Contact Guard Assistance  Distance: 4 feet   Surface: Level Tile  Device:Rolling Walker  Gait Deviations:  Slow sourav with mod SOB and drop in O2 sats to 87%     Balance:  standing balance with one to no UE at support pt reaching slighlty out of bse of support with CGA again SOB and needed rest break before next activity     Exercise:  None this session as her breakfast came- educated pt on proper breathing technique and to use incentive spirometer throughout the day- pt did take extended time to get positioned in chair for comfort and set her up for breakfast .    Functional Outcome Measures: Completed  AM-PAC Inpatient Mobility Raw Score : 15  AM-PAC Inpatient T-Scale Score : 39.45    ASSESSMENT:  Assessment: pt was SOB with activity and her O2 sats did drop while on 4L O2 even with limited activity- she demonstrated decreased strength and endurance and would benefit from cont skilled therapy   Activity Tolerance:  Patient tolerance of  treatment: fair. Several rest breaks        Equipment Recommendations: Other: cont to assess needs  Discharge Recommendations:    Continue to assess pending progress, Subacute/Skilled Nursing Facility, Patient would benefit from continued therapy after discharge    Plan: Times per week: 3-5X GM  Times per day: Daily  Specific instructions for Next Treatment: therex and mobility    Patient Education  Patient Education: Plan of Care, breathing technique     Goals:  Patient goals : feel better  Short term goals  Time Frame for Short term goals: by discharge  Short term goal 1: bed mobility with MOD I to get in/out of bed  Short term goal 2: transfer with S to get in/out of chairs  Short term goal 3: amb >30'x1 with rollator and S to walk safely in room  Long term goals  Time Frame for Long term goals : no LTGs set secondary to short ELOS    Following session, patient left in safe position with all fall risk precautions in place.

## 2021-07-26 NOTE — PROGRESS NOTES
Hospitalist Progress Note    Patient:  Laura Wood      Unit/Bed:4K-13/013-A    YOB: 1955    MRN: [id number]       Acct: [de-identified]     PCP: SUSAN Maria    Date of Admission: 2021       Assessment/Plan:    Acute on chronic respiratory failure [facility] hypoxia/hypercapnia 2/2 COPD exacerbation, GOLD 4 with FEV1 20%, Active.: On BiPaP 26/10, 0.45 FiO2 -  CXR showed pulmonary congestion and right middle lobe infiltrate with mild flattening of diaphgragm. Pulmonary consulted. CTA shows no pulmonary emboli seen. There is questionable pulmonary arterial hypertension. There is subtotal collapse of the right middle lobe which could be due to mucous plug. Cannot exclude other endobronchial pathology. No distinct lesion seen. Patient was examined by pulmonology and had bronchoscopy yesterday on . Bronchoscopy showed thick light yellow-colored mucous plug noted in the right middle lobe intermedius bronchus. This was suctioned out and biopsies were taken. Lung biopsy of the right middle lobe showed benign pulmonary alveolar tissue and was negative for malignancy. Bronchial wash of the right lower lobe and right medial lobe showed acute inflammation with no malignant cells seen. Respiratory culture shows growth of Aspergillus. Infectious disease consult ordered.  -  AB.40, pCO2 81, pO2 69, HCO3 50, BE 21.1. Sat 92% FiO2 .5    -  AB.35; pCO2 85; pO2 55; HCO3 47, BE 17.6; Sat 84%. FiO2 0.4                - Pulmonology Following, ID is following.              - Continue NC @4LPM BiPap to sleep/nap    - Continue Brovana, Pulmicort, Duoneb, Prednisone, roflumilast as prescribed. - Home BiPAP eval completed and patient qualifies for home BiPAP. Lorean Snare - IS as tolerated encouraged. - Aspergillus Glacto Ag assay ordered. Pending results.    - Plan to continue voriconazole with pharmacy to switch to p.o. dosing.     Sepsis (POA) 2/2 Community acquired pneumonia, Active.: SIRS 3/4 Positive present on admission with likely source of infection. Presenting vital signs were blood pressure 181/74, temperature 97, respiratory rate 27, 92 bpm.  WBCs 15.8. Initial lactic acid was 1.4. Portable chest x-ray demonstrates increased groundglass alveolar opacity in the right heart margin related to the medial segment of the right middle lobe. Opacities consistent with early acute middle lobe alveolar pulmonary infection. VRE negative. Sputum production that is yellow and thick. WBC trending down to 13.8 (7/19). CRP elevated at 2.54. Sed rate elevated at 68. Potential for inflammatory etiology. Fungal culture showed no yeast or hyphae observed. Pending respiratory viral culture. Rapid flu a and B was negative. MRSA negative. Covid negative. CTA showed no pulmonary emboli. Subtotal collapse of the right middle lobe. Therapeutic bronchoscopy showed mucous plug causing sub total collapse of right middle lobe. - Pneumonia panel demonstrated parainfluenza virus. Continue supportive care. - Blood cultures x2 show NG currently. Symptomatic primary HTN, Resolved: Noted to be hypertensive on admission with complaints of headache. Continued on lisinopril-hydrochlorothiazide and metoprolol. Hold parameters in place.              - Continue to monitor vitals     Noninsulin-dependent diabetes mellitus type 2, Chronic/Stable: Hyperglycemic upon presentation with most recent  POC glucose at 198. Likely confounded by concomitant corticosteroid use. - Continue to hold metformin              - Continue low dose SSI with hypoglycemia protocol     Heart failure with preserved ejection fraction,  EF 60-65%, Chronic: noted. Continue Lasix. Asymptomatic Bactiuria, Active: Incidental finding on urine culture. Patient has no signs or symptoms of urinary tract infection including no dysuria. Magana D/c'd.  External catheter in place but patient is using restroom to void without problems. Continue to monitor.        Hx of HLD, Stable: Noted. Continue Pravachol        Expected discharge date:  TBD     Disposition:    [] Home       [] TCU       [] Rehab       [] Psych       [] SNF       [x] [place]       [] Other-    Chief Complaint: Progressive SOB    Hospital Course: Anson Ahumada is a 73 y.o. female with a PMHx of severe COPD, HTN, non-insulin-dependent diabetes mellitus type 2, heart failure with preserved ejection fraction, and HLD who presents to [facility] with chief complaint of shortness of breath on BiPAP. She was a transfer from [facility] inpatient unit for potential bronchoscopy due to mucous plugging. Patient was intubated and extubated on 7/13/21 after being treated for 1 week of COPD exacerbation. Patient complains of shortness of breath, wheezing currently. Was placed on BiPAP while here initially at 26/10 0.5 FiO2. Currently on 24/8 with 0.5 FiO2. Still has increased work of breathing. Repeat ABG's show compensation and continued reducing CO2. Cleared for BiPAP use with daytime napping and continuous at bedtime. Patient to follow-up with pulmonology upon discharge for repeat PFTs. Pulmonology is following patient. They discussed Acapella and encouraged its use. Carlos Cash MRSA screening was negative COVID-19 not detected rapid flu a and B- blood culture showed no growth x2 presently, VRE was negative. Urine Cx showed E. Faecalis. Magana was D/c'd with external catheter in place. However patient has been voiding in the restroom herself with no symptoms of UTI. Pneumonia panel did result positive for parainfluenza. We will continue with supportive care. S/p bronchoscopy to right middle lobe with mucous plug suctioning. Patient had home BiPAP evaluation which demonstrated that she qualified for home BiPAP. Patient to use home BiPAP for nights and napping.   Respiratory culture came back positive for Aspergillus. Infectious disease consult was placed. Subjective (past 24 hours): Patient continues to use BiPAP at night and for naps of 24/8. It was noticed that patient's respiratory culture came positive for Aspergillus. Infectious disease consult was placed. Recommend to continue voriconazole. Patient to transition to p.o. voriconazole for 3 weeks. Currently awaiting pre-CERT to go to [facility] for rehabilitation. Patient complains of continued exertional dyspnea, as well as some intermittent headache. Patient denies any chest pain, nausea, vomiting, diarrhea, constipation, fever, chills, dysuria. Follow-up on recently seen right apical pneumothorax. X-ray pleated on 7/26/2021 shows no convincing pneumothorax. Pulmonology no no more needed regarding this issue. ROS (12 point review of systems completed. Pertinent positives noted. Otherwise ROS is negative).     Medications:  Reviewed    Infusion Medications    sodium chloride 25 mL (07/21/21 0230)    dextrose       Scheduled Medications    Roflumilast  250 mcg Oral Daily    potassium replacement protocol   Other RX Placeholder    voriconazole  4 mg/kg Intravenous Q12H    lidocaine  1 patch Transdermal Daily    Or    lidocaine  2 patch Transdermal Daily    Or    lidocaine  3 patch Transdermal Daily    Arformoterol Tartrate  15 mcg Nebulization BID    [Held by provider] budesonide  0.5 mg Nebulization BID    insulin regular  8 Units Intravenous Once    alogliptin  12.5 mg Oral Daily    insulin lispro  0-12 Units Subcutaneous TID WC    insulin lispro  0-6 Units Subcutaneous Nightly    metFORMIN  500 mg Oral BID WC    pantoprazole  40 mg Oral QAM AC    sodium chloride flush  5-40 mL Intravenous 2 times per day    enoxaparin  40 mg Subcutaneous Daily    pravastatin  10 mg Oral Daily    aspirin  81 mg Oral Daily    furosemide  20 mg Oral Daily    metoprolol tartrate  25 mg Oral BID    lisinopril  20 mg Oral Daily    And    hydroCHLOROthiazide  25 mg Oral Daily    ipratropium-albuterol  1 ampule Inhalation 4x daily     PRN Meds: magnesium sulfate, traMADol, ibuprofen, acetaminophen, sodium chloride flush, sodium chloride, ipratropium-albuterol, glucose, dextrose, glucagon (rDNA), dextrose      Intake/Output Summary (Last 24 hours) at 7/26/2021 0905  Last data filed at 7/26/2021 0313  Gross per 24 hour   Intake 1076 ml   Output 1800 ml   Net -724 ml       Diet:  ADULT DIET; Regular; 4 carb choices (60 gm/meal)    Exam:  /68   Pulse 95   Temp 98.3 °F (36.8 °C) (Oral)   Resp 16   Ht 5' 7.5\" (1.715 m)   Wt 227 lb 1.6 oz (103 kg)   SpO2 95%   BMI 35.04 kg/m²     General appearance: Sitting in Chair. Acutely ill appearing on nasal cannula at 5 L/min. Appears stated age and cooperative. HEENT: Normocephalic, atraumatic. PERRLA. EOMI. conjunctivae/corneas clear. Neck: Supple, with full range of motion. No jugular venous distention. Trachea midline. Respiratory:  Increased respiratory effort. Diminished breathsounds noted on b/l lower lung fields. Expiratory wheezes present diffusely. Currently on nasal cannula 5 L/min. Exertional dyspnea present. Cardiovascular: Regular rate and rhythm with normal S1/S2 without murmurs, rubs or gallops. Abdomen: Soft, mild tenderness noted, non-distended with normal bowel sounds. Central obesity noted. Musculoskeletal: passive and active ROM x 4 extremities. Bandage on back noted. Skin: Skin color, texture, turgor normal.  No rashes or lesions. Neurologic:  Neurovascularly intact without any focal sensory/motor deficits.  Cranial nerves: II-XII intact, grossly non-focal.  Psychiatric: Alert and oriented, thought content appropriate, normal insight  Capillary Refill: Brisk,< 3 seconds   Peripheral Pulses: +2 palpable, equal bilaterally       Labs:   Recent Labs     07/23/21  1056   WBC 12.4*   HGB 10.2*   HCT 34.0*        Recent Labs     07/23/21  1115 07/25/21  1805    134*   K 3.8 4.6   CL 90* 87*   CO2 42* 36*   BUN 21 26*   CREATININE 0.7 0.9   CALCIUM 9.3 8.8     Recent Labs     07/25/21  1805   AST 18   ALT 42   BILIDIR <0.2   BILITOT 0.2*   ALKPHOS 69     No results for input(s): INR in the last 72 hours. No results for input(s): Winford Kershaw in the last 72 hours. Microbiology:      Urinalysis:    No results found for: Leanord Pieter, BACTERIA, RBCUA, BLOODU, SPECGRAV, Jaky [address] 994    Radiology:  XR CHEST (2 VW)   Final Result   1. No convincing pneumothorax on the current examination. 2.  Stable right perihilar opacity and right upper lobe nodular density. This document has been electronically signed by: Khadar Molina MD on    07/26/2021 07:16 AM      XR CHEST (2 VW)   Final Result   1. Small right apical pneumothorax. 2. Right upper and parahilar atelectasis/infiltrate. 3. Chronic lung disease. **This report has been created using voice recognition software. It may contain minor errors which are inherent in voice recognition technology. **      Final report electronically signed by Dr. Lia Oliva on 7/24/2021 1:28 PM      XR CHEST (2 VW)   Final Result   1. Tiny right apical pneumothorax. 2.  Right middle lobe opacity and right upper lobe nodule appear unchanged. This document has been electronically signed by: Khadar Molina MD on    07/23/2021 07:54 AM      XR CHEST (2 VW)   Final Result   1. No definite evidence for right sided pneumothorax. 2. Abnormal density in the right upper lobe which could represent atelectasis versus infiltrate. 3. 2.3 x 1.4 cm nodule in the right upper lobe. Calcified granuloma in the right apex. 4. Prominence of the pulmonary arteries which could represent pulmonary hypertension. 5. Atherosclerotic calcification in the aortic arch. **This report has been created using voice recognition software. It may contain minor errors which are inherent in voice recognition technology. **      Final report electronically signed by DR Nola Hernandes on 7/22/2021 8:23 AM      XR CHEST PORTABLE   Final Result   1. Normal heart size. No effusion seen. 2. Persistent findings of mild atelectasis/pneumonia versus postbiopsy changes in the right middle lobe, improved from earlier. Questionable mild atelectasis/pneumonia left lung base. 3. Suggestion of a persistent small less than 5% pneumothorax right apex versus overlying skin fold. 4. Follow-up chest x-ray recommended tomorrow morning. **This report has been created using voice recognition software. It may contain minor errors which are inherent in voice recognition technology. **      Final report electronically signed by Dr. Enrico Hinojosa on 7/21/2021 6:33 PM      XR CHEST PORTABLE   Final Result   1. Normal heart size. Moderate infiltrate in the right middle lobe following recent bronchoscopy, consistent with atelectasis/pneumonia versus post biopsy bleeding or retained fluid resulting from bronchial washings. 2. Tiny less than 5% pneumothorax right apex. 3. Small benign-appearing nodule in the right and left apical region, likely poorly calcified granulomas. **This report has been created using voice recognition software. It may contain minor errors which are inherent in voice recognition technology. **      Final report electronically signed by Dr. Enrico Hinojosa on 7/21/2021 1:50 PM      CTA CHEST W WO CONTRAST   Final Result   1. No pulmonary emboli are seen. Questionable pulmonary arterial hypertension. 2. Subtotal collapse of the right middle lobe which could be due to mucous plug but cannot exclude other endobronchial pathology. No distinct mass lesion is seen, however. **This report has been created using voice recognition software. It may contain minor errors which are inherent in voice recognition technology. **          Final report electronically signed by Dr. Enrico Hinojosa on 7/19/2021 10:04 PM      XR

## 2021-07-26 NOTE — PROGRESS NOTES
[facility]      Patient - Meir Mcneil   MRN -  [id number]   [facility]   - 1955      Date of Admission -  2021 12:12 AM  Date of evaluation -  2021  Room - -13/013-A   Hospital Day - 8  Consulting - Glenna Palumbo MD Primary Care Physician - SUSAN Barnett     Problem List      Active Hospital Problems    Diagnosis Date Noted    Diabetes mellitus [facility]) [E11.9]     Collapse of right lung [J98.11]     Abnormal CT of the chest [R93.89]     COPD (chronic obstructive pulmonary disease) ([facility] [address] [J44.9]     Smoker [F17.200]     Respiratory failure with hypoxia and hypercapnia ([facility] [address] [J96.91, J96.92] 2021     Reason for Consult    COPD exacerbation and shortness of breath  HPI   History Obtained From: Patient and electronic medical record. Meir Mcneil is a 73 y.o. female,  PMHx off COPD, CHF,  DM type 2, HTN, HLD, is admitted to the hospital yesterday  for  shortness of breath and possible bronchocopy. Patient transferred from [facility] inpatient unit for bronchoscopy for mucous plug. Patient was being treated for COPD exacerbation for a week at [facility]; intubated and extubated on 21. She admits sputum production described as yellow and thick. Additional symptoms are headache, fatigue, abdominal pain. Denies blood in sputum. Reports associated headache. Denies chest pain, abdominal pain, dysuria, chills, fever, dizziness, lightheadedness     She is having shortness of breath: Yes  Onset: worsening past month  Duration: on 4L nasal canula home oxygen for yrs but 1months SOB is worsening. Functional status prior to beginning of symptoms: with NC Oxygen half block/s on level ground. Current functional capacity on level ground: 0 block/s on level ground. She can climb steps: No  She admits to orthopnea.     Past 24 Hours   -Stable on O2 4LPM- 95%  -PAP used overnight  -Afebrile  -ID saw today - MOLD (+) RML- VFEN  -Small R PTX resolved     -All other systems reviewed.      PMHx   Past Medical History      Diagnosis Date    CHF (congestive heart failure) (HCC)     COPD (chronic obstructive pulmonary disease) (HCC)     Diabetes mellitus ([address]     Diastolic dysfunction     Environmental and seasonal allergies     HLD (hyperlipidemia)     Hypertension       Past Surgical History        Procedure Laterality Date    APPENDECTOMY      BRONCHOSCOPY N/A 7/21/2021    [facility]    BRONCHOSCOPY  7/21/2021    [facility]    BRONCHOSCOPY  7/21/2021    BRONCHOSCOPY/TRANSBRONCHIAL LUNG BIOPSY performed by Ginna Santos MD at [address] [place] [address]      FOOT SURGERY      HERNIA REPAIR       Meds    Current Medications    Roflumilast  250 mcg Oral Daily    potassium replacement protocol   Other RX Placeholder    voriconazole  4 mg/kg Intravenous Q12H    lidocaine  1 patch Transdermal Daily    Or    lidocaine  2 patch Transdermal Daily    Or    lidocaine  3 patch Transdermal Daily    Arformoterol Tartrate  15 mcg Nebulization BID    [Held by provider] budesonide  0.5 mg Nebulization BID    insulin regular  8 Units Intravenous Once    alogliptin  12.5 mg Oral Daily    insulin lispro  0-12 Units Subcutaneous TID WC    insulin lispro  0-6 Units Subcutaneous Nightly    metFORMIN  500 mg Oral BID WC    pantoprazole  40 mg Oral QAM AC    sodium chloride flush  5-40 mL Intravenous 2 times per day    enoxaparin  40 mg Subcutaneous Daily    pravastatin  10 mg Oral Daily    aspirin  81 mg Oral Daily    furosemide  20 mg Oral Daily    metoprolol tartrate  25 mg Oral BID    lisinopril  20 mg Oral Daily    And    hydroCHLOROthiazide  25 mg Oral Daily    ipratropium-albuterol  1 ampule Inhalation 4x daily     magnesium sulfate, traMADol, ibuprofen, acetaminophen, sodium chloride flush, sodium chloride, ipratropium-albuterol, glucose, dextrose, glucagon (rDNA), dextrose  IV Drips/Infusions   sodium chloride 25 mL (07/21/21 0230)    dextrose       Home Medications  Medications Prior to Admission: ipratropium-albuterol (DUONEB) 0.5-2.5 (3) MG/3ML SOLN nebulizer solution, Inhale 3 mLs into the lungs every 4 hours as needed for Shortness of Breath  budesonide (PULMICORT) 0.5 MG/2ML nebulizer suspension, Take 2 mLs by nebulization 2 times daily  albuterol-ipratropium (COMBIVENT RESPIMAT)  MCG/ACT AERS inhaler, Inhale 1 puff into the lungs every 6 hours as needed for Wheezing or Shortness of Breath  albuterol sulfate  (90 Base) MCG/ACT inhaler, Inhale 2 puffs into the lungs every 4 hours as needed for Wheezing or Shortness of Breath  levocetirizine (XYZAL) 5 MG tablet, Take 1 tablet by mouth nightly  fluticasone (FLONASE) 50 MCG/ACT nasal spray, 2 sprays by Nasal route daily  Roflumilast (DALIRESP) 500 MCG tablet, Take 1 tablet by mouth daily  aspirin 81 MG tablet, Take 81 mg by mouth daily  metoprolol tartrate (LOPRESSOR) 25 MG tablet, Take 25 mg by mouth 2 times daily  lisinopril-hydrochlorothiazide (PRINZIDE;ZESTORETIC) 20-25 MG per tablet, Take 1 tablet by mouth daily  metFORMIN (GLUCOPHAGE) 500 MG tablet, Take 500 mg by mouth 2 times daily (with meals) 1,000 mg at supper.   Cholecalciferol (VITAMIN D) 2000 units CAPS capsule, Take by mouth  Cyanocobalamin (B-12 PO), Take by mouth  pravastatin (PRAVACHOL) 10 MG tablet, Take 10 mg by mouth daily  Pseudoephedrine-Guaifenesin (MUCINEX D PO), Take by mouth as needed   Ibuprofen (ADVIL PO), Take by mouth as needed  FUROSEMIDE PO, Take 20 mg by mouth   Sodium Chloride-Sodium Bicarb (AYR SALINE NASAL RINSE) 1.57 g PACK, 1 packet by Nasal route 2 times daily as needed (sinus congestion) (Patient not taking: Reported on 5/26/2021)  Multiple Vitamins-Minerals (THERAPEUTIC MULTIVITAMIN-MINERALS) tablet, Take 1 tablet by mouth daily  Diet    ADULT DIET; Regular; 4 carb choices (60 gm/meal)  Allergies    Excedrin extra strength [aspirin-acetaminophen-caffeine], Norco [hydrocodone-acetaminophen], and Tylenol [acetaminophen]  Social History     Social History     Socioeconomic History    Marital status:      Spouse name: Not on file    Number of children: Not on file    Years of education: Not on file    Highest education level: Not on file   Occupational History    Not on file   Tobacco Use    Smoking status: Current Some Day Smoker     Packs/day: 1.00     Years: 42.00     Pack years: 42.00     Start date: 3/22/1978    Smokeless tobacco: Never Used    Tobacco comment: 1 pack every 2-3 weeks   Substance and Sexual Activity    Alcohol use: No    Drug use: No    Sexual activity: Not on file   Other Topics Concern    Not on file   Social History Narrative    Not on file     Social Determinants of Health     Financial Resource Strain:     Difficulty of Paying Living Expenses:    Food Insecurity:     Worried About Running Out of Food in the Last Year:     Ran Out of Food in the Last Year:    Transportation Needs:     Lack of Transportation (Medical):  Lack of Transportation (Non-Medical):    Physical Activity:     Days of Exercise per Week:     Minutes of Exercise per Session:    Stress:     Feeling of Stress :    Social Connections:     Frequency of Communication with Friends and Family:     Frequency of Social Gatherings with Friends and Family:     Attends Religion Services:     Active Member of Clubs or Organizations:     Attends Club or Organization Meetings:     Marital Status:    Intimate Partner Violence:     Fear of Current or Ex-Partner:     Emotionally Abused:     Physically Abused:     Sexually Abused:      Family History    No family history on file.   Sleep History    Never diagnosed with sleep apnea in the past.  Occupational history   Occupation:  She is current working: bases.Bilateral occasiona expiratory wheezes. No rales. Abdominal: Soft. No tenderness. Extremities: Patient exhibits no edema. Neurological: Patient is awake and alert. Labs  - Old records and notes have been reviewed in [facility]   ABG  Lab Results   Component Value Date    PH 7.37 07/23/2021    PO2 65 07/23/2021    PCO2 80 07/23/2021    HCO3 46 07/23/2021    O2SAT 90 07/23/2021     Lab Results   Component Value Date    IFIO2 4 07/23/2021    MODE BiLevel 07/18/2021    SETPEEP 10.0 07/19/2021     CBC  No results for input(s): WBC, RBC, HGB, HCT, MCV, MCH, MCHC, RDW, PLT, MPV in the last 72 hours. BMP  Recent Labs     07/25/21  1805   *   K 4.6   CL 87*   CO2 36*   BUN 26*   CREATININE 0.9   GLUCOSE 457*   MG 1.6   CALCIUM 8.8     LFT  Recent Labs     07/25/21  1805   AST 18   ALT 42   BILITOT 0.2*   ALKPHOS 69     TROP  No results found for: TROPONINT  BNP  No results for input(s): BNP in the last 72 hours. Lactic Acid  No results for input(s): LACTA in the last 72 hours. INR  No results for input(s): INR, PROTIME in the last 72 hours. PTT  No results for input(s): APTT in the last 72 hours. Glucose  Recent Labs     07/25/21  1636 07/25/21 2008 07/26/21  0748   POCGLU 389* 327* 170*     UA No results for input(s): SPECGRAV, PHUR, COLORU, CLARITYU, MUCUS, PROTEINU, BLOODU, RBCUA, WBCUA, BACTERIA, NITRU, GLUCOSEU, BILIRUBINUR, UROBILINOGEN, KETUA, LABCAST, LABCASTTY, AMORPHOS in the last 72 hours. Invalid input(s): CRYSTALS.     PFTs     11/4/2020        Sleep studies   Pt stated that she had sleep study a yr ago and never diagnosed with sleep apnea    Cultures    Urine culture: gram (+) cocci chains  VRE (-)  Blood culture: no growth; prelim  COVID-19, Rapid [[id number]] Collected: 07/19/21 1520   Order Status: Completed Specimen: Nasopharyngeal Swab Updated: 07/19/21 1540    SARS-CoV-2, NAAT NOT DETECTED    Comment: Rapid NAAT:   Negative results should be treated as presumptive and,   if inconsistent with clinical signs and symptoms or necessary for   patient management, should be tested with an alternative molecular   assay. Negative results do not preclude SARS-CoV-2 infection and   should not be used as the sole basis for patient management decisions. This test has been authorized by the FDA under an Emergency Use   Authorization (EUA) for use by authorized laboratories. Fact sheet for Healthcare Providers:   Mario.es   Fact sheet for Patients: Mario.scar     METHODOLOGY: Isothermal Nucleic Acid Amplification   Performed at [place] [place], [address]         7/21/21  Bronchoscopy   Endobronchial findings:   Trachea: Normal mucosa. Milly: Normal mucosa  Right main bronchus: Normal mucosa  Right upper lobe bronchus: Normal mucosa  Right Middle lobe bronchus:She was noted to have thick light yellow colored mucus plug noted in the bronchus intermedius. The mucus plug was removed by suctioning. After removing mucus plug, she was noted to have normal mucosa  Right Lower lobe bronchus:Normal mucosa  Left main bronchus: Normal mucosa  Left upper lobe bronchus: Normal mucosa  Left lower lobe bronchus: Normal mucosa     No endobronchial lesions seen    7/21/21  Surgical Pathology  Clinical Information: RML OF LUNG     FINAL DIAGNOSIS:   Lung, right middle lobe, biopsy:    Benign pulmonary alveolar tissue.    Negative for malignancy.     See microscopic.     7/21/21  Cytology  FINAL RESULTS:   Acute inflammation. No malignant cells seen. Bronch washings are growing mold dated 07/21/21    Culture, Respiratory [[id number]] (Abnormal) Collected: 07/21/21 1245   Order Status: Completed Specimen: Respiratory from Bronchial Washing Updated: 07/24/21 1248    Gram Stain Result Few segmented neutrophils observed. No epithelial cells observed. No bacteria seen.      Organism MoldAbnormal        EKG   Normal sinus rhythm  Right bundle branch block  Abnormal ECG  No previous ECGs available  Echocardiogram   2/19/20      Radiology    CXR   07/26/2021   2 view chest x-ray   1. No convincing pneumothorax on the current examination. 2. Stable right perihilar opacity and right upper lobe nodular density. 7/24/21  2 view chest x-ray   1. Tiny right apical pneumothorax. 2. Right middle lobe opacity and right upper lobe nodule appear unchanged. 7/18/2021  Findings:   No pleural effusion. Heart size normal.   No acute fracture. Impression   Impression:   There is increased groundglass alveolar opacity in the right heart margin    related to the medial segment of the right middle lobe. The lungs are    otherwise clear. The right middle lobe opacity is consistent with early acute middle lobe    alveolar pulmonary infection. 7/19/2021   CTA THORAX / PULMONARY EMBOLUS STUDY:   1. No pulmonary emboli are seen. Questionable pulmonary arterial hypertension. 2. Subtotal collapse of the right middle lobe which could be due to mucous plug but cannot exclude other endobronchial pathology. No distinct mass lesion is seen, however. CT Scans 10/22/2019  CT Lung screening. Negative for acute changes and pulmonary nodules. Nocturnal pulse oximetry study results:     Nocturnal pulse oximetry study done on: 07/22/21  The study was done on 4LPM via nasal cannula during night time    Time with SpO2 <89%: 20minutes. Plan:  Patient qualify for home BiPAP    Assessment   -Acute on chronic hypoxic and hypercapnic respiratory failure secondary to COPD exacerbation- Improving.   -Right middle lobe collapse due to mucus plug- S/p Removal of mucus plug.  -Right middle lobe fungal pneumonia. Patient's bronc washings are growing mold. -Community acquired pneumonia S/p Therapy with antibiotics  -COPD exacerbation.  GOLD 4 stage- She qualified for home BiPAP with current settings of 24/8cm H20  -DM type 2.    Plan   -Continue BiPAP 24/8 cm H2O- to be used with daytime napping and at bedtime- Will prescribe home BiPAP with these settings  -Continue Brovana, and Duonebs QID- Metaneb  -Pulmicort DC due to MOLD infection   -Continue voriconazole by starting at 6 mg/kg IV piggyback every 12 hours. -VTE prophylaxis: Lovenox/SCDs  -Acapella/IS discussed and encouraged use   -Continue Daliresp at 250 mcg p.o. daily-Due to it's interaction with voriconazole. -Drug interaction check with voriconazole, Pravachol and alogliptin-no drug interaction was noted per [employer]  -Will keep appt with Josee Jiang CNP on 9/1/21 at 3:00 PM- with CT Chest w/o prior - testing ordered in [facility]   -Surgical pathology reviewed (-) malignant cells   -Cytology - (-) malignant cells   -DME for BiPAP 24/8 cm H2O with 4LPM bleed in   -CXR reviewed- no more needed regarding resolved R PTX  -ID on for ATB management  -PT/OT  -Most likely ECF at [place]- rehab to home   -Discussed with primary RN Huber; if patient continues with intermittent CP that radiates to back and Lidocaine patch doesn't work may seek EKG to r/o cardiac etiology     Plan of care discussed with patient and primary RN   Meds and orders reviewed  Questions and concerns addressed. Electronically signed by   SEVERIANO Fritz CNP on 7/26/2021 at 11:38 AM     Addendum by Dr. Tarsha Escalante MD:  I have seen and examined the patient independently. Face to face evaluation and examination was performed. The above evaluation and note has been reviewed. Labs and radiographs were reviewed. I Have discussed with Ms. IVAN Fritz CNP about this patient in detail. The above assessment and plan has been reviewed. Please see my modifications mentioned below. My modifications:  She is tolerating Vfend well  Improving shortness of breath  Chest x-ray performed today-no evidence of pneumothorax. Chest x-ray PA and lateral views:   Mon Jul 26, 2021  7:16 AM   Narrative

## 2021-07-26 NOTE — CARE COORDINATION
Update: ID plans 3 months PO antifungal Vfend at SNF; collaborated w ID, ROSCOE Bauman, Pharmacy  Electronically signed by Jaxson Dennis RN on 7/26/2021 at 1:33 PM

## 2021-07-27 VITALS
SYSTOLIC BLOOD PRESSURE: 83 MMHG | TEMPERATURE: 97.2 F | OXYGEN SATURATION: 95 % | DIASTOLIC BLOOD PRESSURE: 62 MMHG | RESPIRATION RATE: 18 BRPM | BODY MASS INDEX: 34.75 KG/M2 | HEART RATE: 79 BPM | WEIGHT: 229.3 LBS | HEIGHT: 68 IN

## 2021-07-27 LAB
GLUCOSE BLD-MCNC: 106 MG/DL (ref 70–108)
GLUCOSE BLD-MCNC: 124 MG/DL (ref 70–108)
GLUCOSE BLD-MCNC: 142 MG/DL (ref 70–108)
SARS-COV-2, NAAT: NOT DETECTED

## 2021-07-27 PROCEDURE — 6360000002 HC RX W HCPCS: Performed by: NURSE PRACTITIONER

## 2021-07-27 PROCEDURE — 97110 THERAPEUTIC EXERCISES: CPT

## 2021-07-27 PROCEDURE — 6360000002 HC RX W HCPCS: Performed by: STUDENT IN AN ORGANIZED HEALTH CARE EDUCATION/TRAINING PROGRAM

## 2021-07-27 PROCEDURE — 2700000000 HC OXYGEN THERAPY PER DAY

## 2021-07-27 PROCEDURE — 82948 REAGENT STRIP/BLOOD GLUCOSE: CPT

## 2021-07-27 PROCEDURE — 99232 SBSQ HOSP IP/OBS MODERATE 35: CPT | Performed by: INTERNAL MEDICINE

## 2021-07-27 PROCEDURE — 6370000000 HC RX 637 (ALT 250 FOR IP): Performed by: STUDENT IN AN ORGANIZED HEALTH CARE EDUCATION/TRAINING PROGRAM

## 2021-07-27 PROCEDURE — 94669 MECHANICAL CHEST WALL OSCILL: CPT

## 2021-07-27 PROCEDURE — 87635 SARS-COV-2 COVID-19 AMP PRB: CPT

## 2021-07-27 PROCEDURE — 99239 HOSP IP/OBS DSCHRG MGMT >30: CPT | Performed by: INTERNAL MEDICINE

## 2021-07-27 PROCEDURE — 6370000000 HC RX 637 (ALT 250 FOR IP): Performed by: INTERNAL MEDICINE

## 2021-07-27 PROCEDURE — 94640 AIRWAY INHALATION TREATMENT: CPT

## 2021-07-27 PROCEDURE — 94761 N-INVAS EAR/PLS OXIMETRY MLT: CPT

## 2021-07-27 PROCEDURE — 97530 THERAPEUTIC ACTIVITIES: CPT

## 2021-07-27 PROCEDURE — APPSS30 APP SPLIT SHARED TIME 16-30 MINUTES: Performed by: NURSE PRACTITIONER

## 2021-07-27 RX ORDER — VORICONAZOLE 200 MG/1
200 TABLET, FILM COATED ORAL EVERY 12 HOURS SCHEDULED
Qty: 180 TABLET | Refills: 0 | Status: SHIPPED | OUTPATIENT
Start: 2021-07-27 | End: 2022-08-03 | Stop reason: ALTCHOICE

## 2021-07-27 RX ORDER — ALOGLIPTIN 12.5 MG/1
12.5 TABLET, FILM COATED ORAL DAILY
Qty: 60 TABLET | Refills: 1 | Status: SHIPPED | OUTPATIENT
Start: 2021-07-28

## 2021-07-27 RX ORDER — HYDROCHLOROTHIAZIDE 25 MG/1
25 TABLET ORAL DAILY
Qty: 30 TABLET | Refills: 3 | Status: SHIPPED | OUTPATIENT
Start: 2021-07-28

## 2021-07-27 RX ORDER — ARFORMOTEROL TARTRATE 15 UG/2ML
15 SOLUTION RESPIRATORY (INHALATION) 2 TIMES DAILY
Qty: 120 ML | Refills: 3 | DISCHARGE
Start: 2021-07-27 | End: 2021-12-22 | Stop reason: SDUPTHER

## 2021-07-27 RX ORDER — LISINOPRIL 20 MG/1
20 TABLET ORAL DAILY
Qty: 30 TABLET | Refills: 3 | Status: SHIPPED | OUTPATIENT
Start: 2021-07-28

## 2021-07-27 RX ORDER — TRAMADOL HYDROCHLORIDE 50 MG/1
50 TABLET ORAL EVERY 6 HOURS PRN
Qty: 12 TABLET | Refills: 0 | Status: SHIPPED | OUTPATIENT
Start: 2021-07-27 | End: 2021-07-30

## 2021-07-27 RX ADMIN — VORICONAZOLE 200 MG: 200 TABLET ORAL at 09:20

## 2021-07-27 RX ADMIN — ASPIRIN 81 MG: 81 TABLET, CHEWABLE ORAL at 09:20

## 2021-07-27 RX ADMIN — ARFORMOTEROL TARTRATE 15 MCG: 15 SOLUTION RESPIRATORY (INHALATION) at 17:54

## 2021-07-27 RX ADMIN — PANTOPRAZOLE SODIUM 40 MG: 40 TABLET, DELAYED RELEASE ORAL at 05:38

## 2021-07-27 RX ADMIN — ALOGLIPTIN 12.5 MG: 12.5 TABLET, FILM COATED ORAL at 09:20

## 2021-07-27 RX ADMIN — ENOXAPARIN SODIUM 40 MG: 40 INJECTION, SOLUTION INTRAVENOUS; SUBCUTANEOUS at 09:19

## 2021-07-27 RX ADMIN — ARFORMOTEROL TARTRATE 15 MCG: 15 SOLUTION RESPIRATORY (INHALATION) at 09:36

## 2021-07-27 RX ADMIN — IPRATROPIUM BROMIDE AND ALBUTEROL SULFATE 1 AMPULE: .5; 3 SOLUTION RESPIRATORY (INHALATION) at 17:45

## 2021-07-27 RX ADMIN — PRAVASTATIN SODIUM 10 MG: 10 TABLET ORAL at 09:20

## 2021-07-27 RX ADMIN — ROFLUMILAST 250 MCG: 500 TABLET ORAL at 09:19

## 2021-07-27 RX ADMIN — TRAMADOL HYDROCHLORIDE 50 MG: 50 TABLET ORAL at 00:16

## 2021-07-27 RX ADMIN — IPRATROPIUM BROMIDE AND ALBUTEROL SULFATE 1 AMPULE: .5; 3 SOLUTION RESPIRATORY (INHALATION) at 13:26

## 2021-07-27 RX ADMIN — METFORMIN HYDROCHLORIDE 500 MG: 500 TABLET ORAL at 09:20

## 2021-07-27 RX ADMIN — IBUPROFEN 400 MG: 400 TABLET, FILM COATED ORAL at 03:24

## 2021-07-27 RX ADMIN — IBUPROFEN 400 MG: 400 TABLET, FILM COATED ORAL at 09:19

## 2021-07-27 RX ADMIN — TRAMADOL HYDROCHLORIDE 50 MG: 50 TABLET ORAL at 09:19

## 2021-07-27 RX ADMIN — IPRATROPIUM BROMIDE AND ALBUTEROL SULFATE 1 AMPULE: .5; 3 SOLUTION RESPIRATORY (INHALATION) at 09:30

## 2021-07-27 RX ADMIN — LISINOPRIL 20 MG: 20 TABLET ORAL at 09:20

## 2021-07-27 RX ADMIN — METOPROLOL TARTRATE 25 MG: 25 TABLET, FILM COATED ORAL at 09:20

## 2021-07-27 RX ADMIN — HYDROCHLOROTHIAZIDE 25 MG: 25 TABLET ORAL at 09:20

## 2021-07-27 RX ADMIN — FUROSEMIDE 20 MG: 20 TABLET ORAL at 09:20

## 2021-07-27 RX ADMIN — IPRATROPIUM BROMIDE AND ALBUTEROL SULFATE 1 AMPULE: .5; 3 SOLUTION RESPIRATORY (INHALATION) at 00:44

## 2021-07-27 ASSESSMENT — PAIN SCALES - GENERAL
PAINLEVEL_OUTOF10: 8
PAINLEVEL_OUTOF10: 0
PAINLEVEL_OUTOF10: 8
PAINLEVEL_OUTOF10: 4
PAINLEVEL_OUTOF10: 3

## 2021-07-27 ASSESSMENT — PAIN DESCRIPTION - LOCATION: LOCATION: BACK

## 2021-07-27 ASSESSMENT — PAIN DESCRIPTION - PAIN TYPE: TYPE: ACUTE PAIN

## 2021-07-27 NOTE — PROGRESS NOTES
[facility] ICU STEPDOWN TELEMETRY 4K  Occupational Therapy  Daily Note  Time:   Time In: 8  Time Out: 0513  Timed Code Treatment Minutes: 23 Minutes  Minutes: 23          Date: 2021  Patient Name: Shayna Aldridge,   Gender: female      Room: [facility]13/013-A  MRN: [id number]  : 1955  (73 y.o.)  Referring Practitioner: Siomara Braswell DO  Diagnosis: Respiratory failure  Additional Pertinent Hx: Per H&P, Stephanie Workman is a 73 y.o. female,  PMHx off COPD, CHF,  DM type 2, HTN, HLD, is admitted to the hospital yesterday  for  shortness of breath and possible bronchocopy. Patient transferred from [facility] inpatient unit for bronchoscopy for mucous plug. Patient was being treated for COPD exacerbation for a week at [facility]; intubated and extubated on 21. She admits sputum production described as yellow and thick. Additional symptoms are headache, fatigue, abdominal pain. Denies blood in sputum. Reports associated headache. Denies chest pain, abdominal pain, dysuria, chills, fever, dizziness, lightheadedness. \"    Restrictions/Precautions:  Restrictions/Precautions: General Precautions, Fall Risk  Position Activity Restriction  Other position/activity restrictions: on 5L O2      SUBJECTIVE: Nurse [facility] ok'd session, In bed upon arrival, agreeable to OT session    PAIN: 4/10:  Back    Vitals: Vitals not assessed per clinical judgement, see nursing flowsheet    COGNITION: WFL    ADL:   Feeding: Independent. Able to open all containers  Lower Extremity Dressing: with set-up.  donned B slipper socks sitting at EOB. BED MOBILITY:  Supine to Sit: Supervision HOB elevated, used bedrail    TRANSFERS:  Sit to Stand:  Stand By Assistance. EOB   Stand to Sit: Stand By Assistance. bedside chair    FUNCTIONAL MOBILITY:  Assistive Device: Rolling Walker  Assist Level:  Contact Guard Assistance.    Distance: EOB to bedside chair     ADDITIONAL ACTIVITIES:  Guided patient through BUE AROM this date x10 reps x1 set in chair in order to increase BUE strength and improve activity tolerance for ADLs and homemaking tasks. ASSESSMENT:     Activity Tolerance:  Patient tolerance of  treatment: good. Discharge Recommendations: Continue to assess pending progress, Subacute/Skilled Nursing Facility   Equipment Recommendations: Equipment Needed: No  Other: Monitor needs pending progress. Plan: Times per week: 5x  Current Treatment Recommendations: ROM, Functional Mobility Training, Endurance Training, Balance Training, Safety Education & Training, Self-Care / ADL, Patient/Caregiver Education & Training, Home Management Training, Equipment Evaluation, Education, & procurement    Patient Education  Patient Education: ADL's    Goals  Short term goals  Time Frame for Short term goals: Until discharge  Short term goal 1: Pt will complete BUE AROM exercises with min vcs for technique to increase indep and endurance with all self cares. Short term goal 2: Pt will complete standing tolerance x 2 minutes with S while O2 stats remain above 90% to increase indep with toileting. Short term goal 3: Pt will complete BADL routine with S while demonstrating EC techniques to increase endurance within home environment. Short term goal 4: Pt will complete short distances with S while O2 stats remain above 90% to increase indep and endurance within home environment. Following session, patient left in safe position with all fall risk precautions in place.

## 2021-07-27 NOTE — PROGRESS NOTES
[facility]      Patient - Laura Wood   MRN -  [id number]   [facility]   - 1955      Date of Admission -  2021 12:12 AM  Date of evaluation -  2021  Room - -013-A   Hospital Day - 9  Consulting - Neisha Boo MD Primary Care Physician - SUSAN Maria     Problem List      Active Hospital Problems    Diagnosis Date Noted    Aspergillus ([facility] [B44.9]     Diabetes mellitus ([address] [E11.9]     Collapse of right lung [J98.11]     Abnormal CT of the chest [R93.89]     Stage 4 very severe COPD by GOLD classification ([facility] [address] [J44.9]     Smoker [F17.200]     Respiratory failure with hypoxia and hypercapnia ([address] [J96.91, J96.92] 2021     Reason for Consult    COPD exacerbation and shortness of breath  HPI   History Obtained From: Patient and electronic medical record. Laura Wood is a 73 y.o. female,  PMHx off COPD, CHF,  DM type 2, HTN, HLD, is admitted to the hospital yesterday  for  shortness of breath and possible bronchocopy. Patient transferred from [facility] inpatient unit for bronchoscopy for mucous plug. Patient was being treated for COPD exacerbation for a week at [facility]; intubated and extubated on 21. She admits sputum production described as yellow and thick. Additional symptoms are headache, fatigue, abdominal pain. Denies blood in sputum. Reports associated headache. Denies chest pain, abdominal pain, dysuria, chills, fever, dizziness, lightheadedness     She is having shortness of breath: Yes  Onset: worsening past month  Duration: on 4L nasal canula home oxygen for yrs but 1months SOB is worsening. Functional status prior to beginning of symptoms: with NC Oxygen half block/s on level ground. Current functional capacity on level ground: 0 block/s on level ground. She can climb steps: No  She admits to orthopnea.     Past 24 Hours   -Stable on O2 4LPM- patients baseline   -PAP not used overnight  -Afebrile  -No pulmonary events overnight  -Tolerating VFEN per ID (+) Aspergillus RML    -All other systems reviewed.      PMHx   Past Medical History      Diagnosis Date    CHF (congestive heart failure) (HCC)     COPD (chronic obstructive pulmonary disease) (HCC)     Diabetes mellitus ([facility]     Diastolic dysfunction     Environmental and seasonal allergies     HLD (hyperlipidemia)     Hypertension       Past Surgical History        Procedure Laterality Date    APPENDECTOMY      BRONCHOSCOPY N/A 7/21/2021    BRONCHOSCOPY performed by Luis Alberto Michaud MD at [facility]    BRONCHOSCOPY  7/21/2021    BRONCHOSCOPY FLUOROSCOPY performed by Luis Alberto Michaud MD at [facility]    BRONCHOSCOPY  7/21/2021    BRONCHOSCOPY/TRANSBRONCHIAL LUNG BIOPSY performed by Luis Alberto Michaud MD at [address]      FOOT SURGERY      HERNIA REPAIR       Meds    Current Medications    voriconazole  200 mg Oral 2 times per day    Roflumilast  250 mcg Oral Daily    potassium replacement protocol   Other RX Placeholder    lidocaine  1 patch Transdermal Daily    Or    lidocaine  2 patch Transdermal Daily    Or    lidocaine  3 patch Transdermal Daily    Arformoterol Tartrate  15 mcg Nebulization BID    [Held by provider] budesonide  0.5 mg Nebulization BID    insulin regular  8 Units Intravenous Once    alogliptin  12.5 mg Oral Daily    insulin lispro  0-12 Units Subcutaneous TID WC    insulin lispro  0-6 Units Subcutaneous Nightly    metFORMIN  500 mg Oral BID WC    pantoprazole  40 mg Oral QAM AC    sodium chloride flush  5-40 mL Intravenous 2 times per day    enoxaparin  40 mg Subcutaneous Daily    pravastatin  10 mg Oral Daily    aspirin  81 mg Oral Daily    furosemide  20 mg Oral Daily    metoprolol tartrate  25 mg Oral BID    lisinopril  20 mg Oral Daily    And    hydroCHLOROthiazide  25 mg Oral Daily    ipratropium-albuterol  1 ampule Inhalation 4x daily     magnesium sulfate, traMADol, ibuprofen, acetaminophen, sodium chloride flush, sodium chloride, ipratropium-albuterol, glucose, dextrose, glucagon (rDNA), dextrose  IV Drips/Infusions   sodium chloride 25 mL (07/21/21 0230)    dextrose       Home Medications  Medications Prior to Admission: ipratropium-albuterol (DUONEB) 0.5-2.5 (3) MG/3ML SOLN nebulizer solution, Inhale 3 mLs into the lungs every 4 hours as needed for Shortness of Breath  albuterol sulfate  (90 Base) MCG/ACT inhaler, Inhale 2 puffs into the lungs every 4 hours as needed for Wheezing or Shortness of Breath  levocetirizine (XYZAL) 5 MG tablet, Take 1 tablet by mouth nightly  fluticasone (FLONASE) 50 MCG/ACT nasal spray, 2 sprays by Nasal route daily  aspirin 81 MG tablet, Take 81 mg by mouth daily  metoprolol tartrate (LOPRESSOR) 25 MG tablet, Take 25 mg by mouth 2 times daily  lisinopril-hydrochlorothiazide (PRINZIDE;ZESTORETIC) 20-25 MG per tablet, Take 1 tablet by mouth daily  metFORMIN (GLUCOPHAGE) 500 MG tablet, Take 500 mg by mouth 2 times daily (with meals) 1,000 mg at supper.   Cholecalciferol (VITAMIN D) 2000 units CAPS capsule, Take by mouth  Cyanocobalamin (B-12 PO), Take by mouth  pravastatin (PRAVACHOL) 10 MG tablet, Take 10 mg by mouth daily  Pseudoephedrine-Guaifenesin (MUCINEX D PO), Take by mouth as needed   Ibuprofen (ADVIL PO), Take by mouth as needed  FUROSEMIDE PO, Take 20 mg by mouth   [DISCONTINUED] budesonide (PULMICORT) 0.5 MG/2ML nebulizer suspension, Take 2 mLs by nebulization 2 times daily  [DISCONTINUED] albuterol-ipratropium (COMBIVENT RESPIMAT)  MCG/ACT AERS inhaler, Inhale 1 puff into the lungs every 6 hours as needed for Wheezing or Shortness of Breath  [DISCONTINUED] Roflumilast (DALIRESP) 500 MCG tablet, Take 1 tablet by mouth daily  Sodium Chloride-Sodium Bicarb (AYR SALINE NASAL RINSE) 1.57 g PACK, 1 packet by Nasal route 2 times daily as needed (sinus congestion) (Patient not taking: Reported on 5/26/2021)  Multiple Vitamins-Minerals (THERAPEUTIC MULTIVITAMIN-MINERALS) tablet, Take 1 tablet by mouth daily  Diet    ADULT DIET; Regular; 4 carb choices (60 gm/meal)  Allergies    Excedrin extra strength [aspirin-acetaminophen-caffeine], Norco [hydrocodone-acetaminophen], and Tylenol [acetaminophen]  Social History     Social History     Socioeconomic History    Marital status:      Spouse name: Not on file    Number of children: Not on file    Years of education: Not on file    Highest education level: Not on file   Occupational History    Not on file   Tobacco Use    Smoking status: Current Some Day Smoker     Packs/day: 1.00     Years: 42.00     Pack years: 42.00     Start date: 3/22/1978    Smokeless tobacco: Never Used    Tobacco comment: 1 pack every 2-3 weeks   Substance and Sexual Activity    Alcohol use: No    Drug use: No    Sexual activity: Not on file   Other Topics Concern    Not on file   Social History Narrative    Not on file     Social Determinants of Health     Financial Resource Strain:     Difficulty of Paying Living Expenses:    Food Insecurity:     Worried About Running Out of Food in the Last Year:     Ran Out of Food in the Last Year:    Transportation Needs:     Lack of Transportation (Medical):      Lack of Transportation (Non-Medical):    Physical Activity:     Days of Exercise per Week:     Minutes of Exercise per Session:    Stress:     Feeling of Stress :    Social Connections:     Frequency of Communication with Friends and Family:     Frequency of Social Gatherings with Friends and Family:     Attends Islam Services:     Active Member of Clubs or Organizations:     Attends Club or Organization Meetings:     Marital Status:    Intimate Partner Violence:     Fear of Current or Ex-Partner:     Emotionally Abused:     Physically Abused:     Sexually Abused:      Family History    No family history on file. Sleep History    Never diagnosed with sleep apnea in the past.  Occupational history   Occupation:  She is current working: No  Type of profession: retired. History of tobacco smoking:Yes  Amount of tobacco smokin.0 PPD. Years of tobacco smokin. Quit smoking: Yes. Quit year:few yrs ago   Current smoker: No.       History of recreational or IV drug use in the past:NO     History of exposure to coal mines/coal dust: NO  History of exposure to foundry dust/welding: NO  History of exposure to quarry/silica/sandblasting: NO  History of exposure to asbestos/working with breaks/ships: NO  History of exposure to farm dust: NO  History of recent travel to long distances: NO  History of exposure to birds, pigeons, or chickens in the past:NO  History of pulmonary embolism in the past: No     denies       History of DVT in the past:No             Vitals     height is 5' 7.5\" (1.715 m) and weight is 229 lb 4.8 oz (104 kg). Her oral temperature is 98.2 °F (36.8 °C). Her blood pressure is 97/49 (abnormal) and her pulse is 88. Her respiration is 20 and oxygen saturation is 95%. Body mass index is 35.38 kg/m². SUPPLEMENTAL O2: O2 Flow Rate (L/min): 4 L/min     I/O        Intake/Output Summary (Last 24 hours) at 2021 0928  Last data filed at 2021 0308  Gross per 24 hour   Intake 1155.09 ml   Output 1800 ml   Net -644.91 ml     I/O last 3 completed shifts: In: 1395.1 [P.O.:1040; I.V.:355.1]  Out: 1800 [Urine:1800]   Patient Vitals for the past 96 hrs (Last 3 readings):   Weight   21 229 lb 4.8 oz (104 kg)   21 227 lb 1.6 oz (103 kg)   21 233 lb 14.4 oz (106.1 kg)       Exam   Constitutional: Patient appears in no distress on 4LPM via nasal cannula. C/o cough that causes radiating CP that radiates to her back   Mouth/Throat: Oropharynx is clear and moist.  No oral thrush.   Neck: Neck supple. No JVD  Cardiovascular: ST. S1 and S2 heard. No murmurs. Pulmonary/Chest: Bilateral air entry present. Good breath sounds on both sides, diminished at bases. Bilateral occasiona expiratory wheezes. No rales. Abdominal: Soft. No tenderness. Extremities: Patient exhibits no edema. Neurological: Patient is awake and alert. Labs  - Old records and notes have been reviewed in [facility]   ABG  Lab Results   Component Value Date    PH 7.37 07/23/2021    PO2 65 07/23/2021    PCO2 80 07/23/2021    HCO3 46 07/23/2021    O2SAT 90 07/23/2021     Lab Results   Component Value Date    IFIO2 4 07/23/2021    MODE BiLevel 07/18/2021    SETPEEP 10.0 07/19/2021     CBC  No results for input(s): WBC, RBC, HGB, HCT, MCV, MCH, MCHC, RDW, PLT, MPV in the last 72 hours. BMP  Recent Labs     07/25/21  1805   *   K 4.6   CL 87*   CO2 36*   BUN 26*   CREATININE 0.9   GLUCOSE 457*   MG 1.6   CALCIUM 8.8     LFT  Recent Labs     07/25/21  1805   AST 18   ALT 42   BILITOT 0.2*   ALKPHOS 69     TROP  No results found for: TROPONINT  BNP  No results for input(s): BNP in the last 72 hours. Lactic Acid  No results for input(s): LACTA in the last 72 hours. INR  No results for input(s): INR, PROTIME in the last 72 hours. PTT  No results for input(s): APTT in the last 72 hours. Glucose  Recent Labs     07/26/21  1637 07/26/21  1946 07/27/21  0736   POCGLU 111* 169* 106     UA No results for input(s): SPECGRAV, PHUR, COLORU, CLARITYU, MUCUS, PROTEINU, BLOODU, RBCUA, WBCUA, BACTERIA, NITRU, GLUCOSEU, BILIRUBINUR, UROBILINOGEN, KETUA, LABCAST, LABCASTTY, AMORPHOS in the last 72 hours. Invalid input(s): CRYSTALS.     PFTs     11/4/2020        Sleep studies   Pt stated that she had sleep study a yr ago and never diagnosed with sleep apnea    Cultures    Urine culture: gram (+) cocci chains  VRE (-)  Blood culture: no growth; prelim  COVID-19, Rapid [[id number]] Collected: 07/19/21 1520   Order Status: Completed Specimen: Nasopharyngeal Swab Updated: 07/19/21 1540    SARS-CoV-2, NAAT NOT DETECTED    Comment: Rapid NAAT:   Negative results should be treated as presumptive and,   if inconsistent with clinical signs and symptoms or necessary for   patient management, should be tested with an alternative molecular   assay. Negative results do not preclude SARS-CoV-2 infection and   should not be used as the sole basis for patient management decisions. This test has been authorized by the FDA under an Emergency Use   Authorization (EUA) for use by authorized laboratories. Fact sheet for Healthcare Providers:   Mario.scar   Fact sheet for Patients: Mario.scar     METHODOLOGY: Isothermal Nucleic Acid Amplification   Performed at [facility] [address], [address]         7/21/21  Bronchoscopy   Endobronchial findings:   Trachea: Normal mucosa. Milly: Normal mucosa  Right main bronchus: Normal mucosa  Right upper lobe bronchus: Normal mucosa  Right Middle lobe bronchus:She was noted to have thick light yellow colored mucus plug noted in the bronchus intermedius. The mucus plug was removed by suctioning. After removing mucus plug, she was noted to have normal mucosa  Right Lower lobe bronchus:Normal mucosa  Left main bronchus: Normal mucosa  Left upper lobe bronchus: Normal mucosa  Left lower lobe bronchus: Normal mucosa     No endobronchial lesions seen    7/21/21  Surgical Pathology  Clinical Information: RML OF LUNG     FINAL DIAGNOSIS:   Lung, right middle lobe, biopsy:    Benign pulmonary alveolar tissue.    Negative for malignancy.     See microscopic.     7/21/21  Cytology  FINAL RESULTS:   Acute inflammation. No malignant cells seen.     Bronch washings are growing mold dated 07/21/21    Culture, Respiratory [[id number]] (Abnormal) Collected: 07/21/21 1245   Order Status: Completed Specimen: Respiratory from Bronchial Washing Updated: 07/26/21 1158    Respiratory Culture Drugs in current antibiotic therapy are ineffective in vitro for isolate. Abnormal     Gram Stain Result Few segmented neutrophils observed. No epithelial cells observed. No bacteria seen. Organism Aspergillus speciesAbnormal     Respiratory Culture light growth    Narrative:     Source: bronchial washings       Site: RML RLL          Current Antibiotics: Ceftriaxone,   Azithromycin   Culture, Fungus [[id number]] (Abnormal) Collected: 07/21/21 1245   Order Status: Completed Specimen: Respiratory from Bronchial Washing Updated: 07/26/21 1156    Fungus Smear No yeast or hyphae observed     Organism Aspergillus speciesAbnormal      EKG   Normal sinus rhythm  Right bundle branch block  Abnormal ECG  No previous ECGs available  Echocardiogram   2/19/20      Radiology    CXR   07/26/2021   2 view chest x-ray   1. No convincing pneumothorax on the current examination. 2. Stable right perihilar opacity and right upper lobe nodular density. 7/24/21  2 view chest x-ray   1. Tiny right apical pneumothorax. 2. Right middle lobe opacity and right upper lobe nodule appear unchanged. 7/18/2021  Findings:   No pleural effusion. Heart size normal.   No acute fracture. Impression   Impression:   There is increased groundglass alveolar opacity in the right heart margin    related to the medial segment of the right middle lobe. The lungs are    otherwise clear. The right middle lobe opacity is consistent with early acute middle lobe    alveolar pulmonary infection. 7/19/2021   CTA THORAX / PULMONARY EMBOLUS STUDY:   1. No pulmonary emboli are seen. Questionable pulmonary arterial hypertension. 2. Subtotal collapse of the right middle lobe which could be due to mucous plug but cannot exclude other endobronchial pathology. No distinct mass lesion is seen, however. CT Scans 10/22/2019  CT Lung screening. Negative for acute changes and pulmonary nodules. Nocturnal pulse oximetry study results:     Nocturnal pulse oximetry study done on: 07/22/21  The study was done on 4LPM via nasal cannula during night time    Time with SpO2 <89%: 20minutes. Plan:  Patient qualify for home BiPAP    Assessment   -Acute on chronic hypoxic and hypercapnic respiratory failure secondary to COPD exacerbation- Improving.   -Right middle lobe collapse due to mucus plug- S/p Removal of mucus plug.  -Right middle lobe fungal pneumonia. Patient's bron washings are growing mold. 7/27/21 (+) Aspergillus   -Community acquired pneumonia S/p Therapy with antibiotics  -COPD exacerbation. GOLD 4 stage- She qualified for home BiPAP with current settings of 24/8cm H20 with 4LPM O2 bleed in   -DM type 2.  -Full Code     Plan   -Continue BiPAP 24/8 cm H2O- to be used with daytime napping and at bedtime- Will prescribe home BiPAP with these settings  -Continue Brovana, and Duonebs QID- Metaneb  -Tolerating VFEN per ID  -VTE prophylaxis: Lovenox/SCDs  -Acapella/IS discussed and encouraged use   -Continue Daliresp decreased to 250 mcg p.o. daily-Due to it's interaction with voriconazole.   -Drug interaction check with voriconazole, Pravachol and alogliptin-no drug interaction was noted per [employer]  -Surgical pathology reviewed (-) malignant cells   -Cytology - (-) malignant cells   -PT/OT  -ECF at [place]- will need antifungal for Aspergillus infection x 12 weeks total per ID- Aspergillus Glacto AG Assay pending per ID   -Stable from pulmonary standpoint will sign off and follow up as outlined below    -Continued Duoneb at [facility] for SOB/wheezing   -Brovana neb BID  -Daliresp 250 mcg PO daily while on VFEN- can increase to 500 mcg once done with antibiotic therapy   -Pulmicort on HOLD right now due to fungal infection can be restarted after infection cleared per ID    -Will keep appt with Angelo Olmos CNP on 9/1/21 at 3:00 PM- with CT Chest w/o prior - testing ordered in [facility]   -[facility] for BiPAP 24/8 cm H2O with 4LPM bleed in for Severe COPD    Plan of care discussed with patient and primary RN   Meds and orders reviewed  Questions and concerns addressed. Electronically signed by   SEVERIANO Medel CNP on 7/27/2021 at 9:28 AM     Addendum by Dr. Marisela Kapadia MD:  I have seen and examined the patient independently. Face to face evaluation and examination was performed. The above evaluation and note has been reviewed. Labs and radiographs were reviewed. I Have discussed with Ms. IVAN Medel CNP about this patient in detail. The above assessment and plan has been reviewed. Please see my modifications mentioned below. My modifications:  She is on 4 L of oxygen on nasal cannula- Baseline oxygen requirement. I spoke with Dr. Liliya Cazares MD-From infectious service regarding the oral antifungal treatment.   Follow-up as above    Bernadette Chung MD 7/27/2021 9:42 PM

## 2021-07-27 NOTE — CARE COORDINATION
7/27/21, 8:24 AM EDT    DISCHARGE PLANNING EVALUATION      SW called and spoke with Yuki and left a message for [employer]. SW requested a call back regarding if they are still able to accept patient with antifungal medications for 12 weeks.

## 2021-07-27 NOTE — PROGRESS NOTES
Intravenous Once    alogliptin  12.5 mg Oral Daily    insulin lispro  0-12 Units Subcutaneous TID WC    insulin lispro  0-6 Units Subcutaneous Nightly    metFORMIN  500 mg Oral BID WC    pantoprazole  40 mg Oral QAM AC    sodium chloride flush  5-40 mL Intravenous 2 times per day    enoxaparin  40 mg Subcutaneous Daily    pravastatin  10 mg Oral Daily    aspirin  81 mg Oral Daily    furosemide  20 mg Oral Daily    metoprolol tartrate  25 mg Oral BID    lisinopril  20 mg Oral Daily    And    hydroCHLOROthiazide  25 mg Oral Daily    ipratropium-albuterol  1 ampule Inhalation 4x daily      sodium chloride 25 mL (07/21/21 0230)    dextrose       magnesium sulfate, traMADol, ibuprofen, acetaminophen, sodium chloride flush, sodium chloride, ipratropium-albuterol, glucose, dextrose, glucagon (rDNA), dextrose  LABS:   CBC No results for input(s): WBC, HGB, HCT, MCV, PLT in the last 72 hours. BMP:   Recent Labs     07/25/21  1805   *   K 4.6   CL 87*   CO2 36*   BUN 26*   CREATININE 0.9   GLUCOSE 457*   MG 1.6     LIVER PROFILE   Recent Labs     07/25/21  1805   AST 18   ALT 42   BILIDIR <0.2   BILITOT 0.2*   ALKPHOS 69     INR No results for input(s): INR in the last 72 hours. PTT No results found for: APTT    CULTURES:   UA: No results for input(s): SPECGRAV, PHUR, COLORU, CLARITYU, MUCUS, PROTEINU, BLOODU, RBCUA, WBCUA, BACTERIA, NITRU, GLUCOSEU, BILIRUBINUR, UROBILINOGEN, KETUA, LABCAST, LABCASTTY, AMORPHOS in the last 72 hours.     Invalid input(s): CRYSTALS  Micro:   Lab Results   Component Value Date    BC No growth-preliminary No growth  07/18/2021     Patient Active Problem List   Diagnosis Code    Respiratory failure with hypoxia and hypercapnia (HCC) J96.91, J96.92    Stage 4 very severe COPD by GOLD classification ([facility] J44.9    Smoker F17.200    Collapse of right lung J98.11    Abnormal CT of the chest R93.89    Diabetes mellitus ([facility] E11.9    Aspergillus ([facility] B44.9 Impression and Recommendation:     Pneumonia: Bronchial washing cultures positive for mold. Culture reports aspergillus. Patient transitioned to PO Voriconazole, with anticipated course of 3 months. Aspergillus Glacto AG Assay pending  -Patient is experiencing adverse reaction with blurry vision. This is a very common side effect of voriconazole. Fortunately this side effect is reversible and stopped when medication is completed. This was discussed at length clinical pharmacist as well as with the patient. Tobacco Abuse: patient stopped smoking 18 days ago. Smoking cessation further advised with patient   COPD Exacerbation: Per primary   Uncontrolled DM: Per primary   Continue current therapy per primary. Plans to go to [address] [place] [address] at discharge. Patient concerned with the able to make pulmonology follow-up appointment after she returns to skilled nursing facility. Discussed with case management and placement still pending. Social work and case management to further resolve any questions or concerns from patient. Case was discussed with Infectious Disease Attending, Dr. Roberta Aguayo as well as primary attending Dr. Az Thorne and clinical pharmacist [facility], 7/27/2021 8:43 AM   Patient was seen and examined face-to-face by me  The chart, progress notes, labs and radiographs were reviewed. Case discussed with the nurse. Questions and concerns were addressed. I agree with the progress note.

## 2021-07-27 NOTE — PLAN OF CARE
Problem: Serum Glucose Level - Abnormal:  Goal: Ability to maintain appropriate glucose levels will improve  Description: Ability to maintain appropriate glucose levels will improve  Outcome: Ongoing  Note: Patient is getting chems checked ACHS and insulin coverage ordered. Problem: Pain Control  Goal: Maintain pain level at or below patient's acceptable level (or 5 if patient is unable to determine acceptable level)  Outcome: Ongoing  Flowsheets (Taken 7/26/2021 4868)  Patient's Stated Pain Goal: No pain  Note: Pain Assessment: 0-10  Pain Level: 3   Patient's Stated Pain Goal: No pain   Is pain goal met at this time? Yes     Non-Pharmaceutical Pain Intervention(s): Distraction, Rest, Repositioned       Problem: Cardiovascular  Goal: Hemodynamic stability  Outcome: Ongoing  Note:   Vitals:    07/26/21 2300   BP: (!) 106/53   Pulse: 84   Resp: 20   Temp: 98.4 °F (36.9 °C)   SpO2: 96%          Problem: Respiratory  Goal: O2 Sat > 90%  Outcome: Ongoing  Note: Patient is on 4L at this time which is baseline. Sating above 90%      Problem: GI  Goal: No bowel complications  Outcome: Ongoing  Note: Patient is denying any abd pain or tenderness at this time. Bowel sounds are active X4. Problem: Nutrition  Goal: Optimal nutrition therapy  Outcome: Ongoing  Note: Patient is on a regular diet and seems to be tolerating it well. Problem: Skin Integrity/Risk  Goal: No skin breakdown during hospitalization  Outcome: Ongoing  Note: Pt has had no new skin breakdown this shift. Pt is assisted with turning every two hours while in bed and is educated on the importance of relieving pressure to prevent skin breakdown. Problem: Musculor/Skeletal Functional Status  Goal: Absence of falls  Outcome: Ongoing  Note: Pt has remained free of falls this shift . Fall precautions in place: bed alarm on, bed wheels locked, bed in lowest position. Call light within reach, pt demonstrates proper use of call light. Problem:   Goal: Adequate urinary output  Outcome: Ongoing  Note: Monitoring I&O's every  8 hours at this time. Problem: Discharge Planning:  Goal: Discharged to appropriate level of care  Description: Discharged to appropriate level of care  7/26/2021 2340 by Jonas Sampson RN  Outcome: Ongoing  Note: Patient is from [facility]. The plan is to return back once medically stable. Problem: Musculor/Skeletal Functional Status  Goal: Highest potential functional level  Outcome: Ongoing  Note: Patient is a 1 assist while up. Care plan reviewed with patient. Patient verbalize understanding of the plan of care and contribute to goal setting.

## 2021-07-27 NOTE — DISCHARGE SUMMARY
COVID-19 not detected rapid flu a and B- blood culture showed no growth x2 presently, VRE was negative. Urine Cx showed E. Faecalis. Magana was D/c'd with external catheter in place. However patient has been voiding in the restroom herself with no symptoms of UTI. Pneumonia panel did result positive for parainfluenza. We will continue with supportive care. S/p bronchoscopy to right middle lobe with mucous plug suctioning and cytology produced negative malignant cells. Patient had home BiPAP evaluation which demonstrated that she qualified for home BiPAP. Patient to use home BiPAP for nights and napping 24/8 to 4 L/min bleed. Respiratory culture came back positive for Aspergillus. Infectious disease consult was placed and recommends voriconazole for 3 months. Patient was educated in the importance of being active in her rehabilitation as well as continue incentive spirometry. Patient still has complaints of exertional dyspnea however states she feels much better than her initial presentation. Is able to stay stable on nasal cannula 4 L/min. Patient was concerned about getting to her pulmonology appointment, however SNF is able to transport for appointments. The patient was stable for discharge - all consultants were contacted and in agreement with plan for discharge. Appropriate follow up appointment was arranged prior to discharge. Please see below or view chart for more details from hospital course. Discharge Diagnoses:    Acute on chronic respiratory failure witih hypoxia/hypercapnia 2/2 COPD exacerbation, GOLD 4 with FEV1 20%, Resolving: On BiPaP 26/10, 0.45 FiO2 -  CXR showed pulmonary congestion and right middle lobe infiltrate with mild flattening of diaphgragm. Pulmonary consulted. CTA shows no pulmonary emboli seen. There is questionable pulmonary arterial hypertension. There is subtotal collapse of the right middle lobe which could be due to mucous plug.   Cannot exclude other endobronchial pathology. No distinct lesion seen. Patient was examined by pulmonology and had bronchoscopy yesterday on . Bronchoscopy showed thick light yellow-colored mucous plug noted in the right middle lobe intermedius bronchus. This was suctioned out and biopsies were taken. Lung biopsy of the right middle lobe showed benign pulmonary alveolar tissue and was negative for malignancy. Bronchial wash of the right lower lobe and right medial lobe showed acute inflammation with no malignant cells seen. Respiratory culture shows growth of Aspergillus. Infectious disease consult ordered.  -  AB.40, pCO2 81, pO2 69, HCO3 50, BE 21.1. Sat 92% FiO2 .5    -  AB.35; pCO2 85; pO2 55; HCO3 47, BE 17.6; Sat 84%. FiO2 0.4                - Pulmonology Following, ID is following              - Continue NC @4LPM baseline.   - Continue DuoNeb, Daliresp and Brovana per pulmonology recommendations   - Patient to continue with home BiPAP for nights and naps. - Incentive spirometry is encouraged. - Aspergillus Glacto Ag assay ordered. Pending results. Cx already identified positive  Aspergillus. - Complete course of voriconazole 200 mg twice daily for 3 months.      Sepsis (POA) 2/2 Community acquired pneumonia, Resolving.: SIRS 3/4 Positive present on admission with likely source of infection. Presenting vital signs were blood pressure 181/74, temperature 97, respiratory rate 27, 92 bpm.  WBCs 15.8. Initial lactic acid was 1.4. Portable chest x-ray demonstrates increased groundglass alveolar opacity in the right heart margin related to the medial segment of the right middle lobe. Opacities consistent with early acute middle lobe alveolar pulmonary infection. VRE negative. Sputum production that is yellow and thick. WBC trending down to 13.8 (). CRP elevated at 2.54. Sed rate elevated at 68. Potential for inflammatory etiology. Fungal culture showed no yeast or hyphae observed. Pending respiratory viral culture. Rapid flu a and B was negative. MRSA negative. Covid negative. CTA showed no pulmonary emboli. Subtotal collapse of the right middle lobe. Therapeutic bronchoscopy showed mucous plug causing sub total collapse of right middle lobe. - Pneumonia panel demonstrated parainfluenza virus. Symptomatic primary HTN, Resolved: Noted to be hypertensive on admission with complaints of headache. Continued on lisinopril-hydrochlorothiazide and metoprolol. Hold parameters in place.              - Continue to monitor vitals     Noninsulin-dependent diabetes mellitus type 2, Chronic/Stable: Hyperglycemic upon presentation with most recent  POC glucose at 198. Likely confounded by concomitant corticosteroid use. - Continue home medications     Heart failure with preserved ejection fraction,  EF 60-65%, Chronic: noted. Continue Lasix. Asymptomatic Bactiuria, Active: Incidental finding on urine culture. Patient has no signs or symptoms of urinary tract infection including no dysuria. Magana D/c'd. External catheter in place but patient is using restroom to void without problems.        Hx of HLD, Stable: Noted. Continue Pravachol       The patient was seen and examined on day of discharge and this discharge summary is in conjunction with any daily progress note from day of discharge. The patient understood, was in agreement, and verbalized the plan of care at time of discharge.         Exam:    Vitals:   Vitals:    07/27/21 0300 07/27/21 0315 07/27/21 0929 07/27/21 1115   BP: (!) 97/49  (!) 114/59 (!) 97/52   Pulse: 88  80 86   Resp: 20  18 17   Temp: 98.2 °F (36.8 °C)  98 °F (36.7 °C) 98 °F (36.7 °C)   TempSrc: Oral  Oral Oral   SpO2: 95%  96% 93%   Weight:  229 lb 4.8 oz (104 kg)     Height:         Weight: Weight: 229 lb 4.8 oz (104 kg)    24 hour intake/output:    Intake/Output Summary (Last 24 hours) at 7/27/2021 6692  Last data filed at 7/27/2021 1351  Gross per 24 hour   Intake 1020 ml   Output 600 ml   Net 420 ml         Physical Exam    General appearance:  Clinically patient appears to be improving. Patient is currently sitting in chair nasal cannula 4 L/min. Appears stated age and cooperative. HEENT: Normocephalic, atraumatic. PERRLA. EOMI. conjunctivae/corneas clear. Neck: Supple, with full range of motion. No jugular venous distention. Trachea midline. Respiratory:  Increased respiratory effort. Diminished breathsounds noted on b/l lower lung fields. Expiratory wheezes present. Currently on nasal cannula 4 L/min. Exertional dyspnea continue present. Cardiovascular: Regular rate and rhythm with normal S1/S2 without murmurs, rubs or gallops. Abdomen: Soft, mild tenderness noted, non-distended with normal bowel sounds. Central obesity noted. Musculoskeletal: passive and active ROM x 4 extremities. Skin: Skin color, texture, turgor normal.  No rashes or lesions. Neurologic:  Neurovascularly intact without any focal sensory/motor deficits. Cranial nerves: II-XII intact, grossly non-focal.  Psychiatric: Alert and oriented, thought content appropriate, normal insight  Capillary Refill: Brisk,< 3 seconds   Peripheral Pulses: +2 palpable, equal bilaterally       Labs: For convenience and continuity at follow-up the following most recent labs are provided:      CBC:    Lab Results   Component Value Date    WBC 12.4 07/23/2021    HGB 10.2 07/23/2021    HCT 34.0 07/23/2021     07/23/2021       Renal:    Lab Results   Component Value Date     07/25/2021    K 4.6 07/25/2021    K 4.1 07/19/2021    CL 87 07/25/2021    CO2 36 07/25/2021    BUN 26 07/25/2021    CREATININE 0.9 07/25/2021    CALCIUM 8.8 07/25/2021    PHOS 4.0 07/18/2021         Significant Diagnostic Studies    Radiology:   XR CHEST (2 VW)   Final Result   1. No convincing pneumothorax on the current examination.    2.  Stable right perihilar opacity and right upper lobe nodular density. This document has been electronically signed by: Harjit Johnson MD on    07/26/2021 07:16 AM      XR CHEST (2 VW)   Final Result   1. Small right apical pneumothorax. 2. Right upper and parahilar atelectasis/infiltrate. 3. Chronic lung disease. **This report has been created using voice recognition software. It may contain minor errors which are inherent in voice recognition technology. **      Final report electronically signed by Dr. Douglas Pizarro on 7/24/2021 1:28 PM      XR CHEST (2 VW)   Final Result   1. Tiny right apical pneumothorax. 2.  Right middle lobe opacity and right upper lobe nodule appear unchanged. This document has been electronically signed by: Harjit Johnson MD on    07/23/2021 07:54 AM      XR CHEST (2 VW)   Final Result   1. No definite evidence for right sided pneumothorax. 2. Abnormal density in the right upper lobe which could represent atelectasis versus infiltrate. 3. 2.3 x 1.4 cm nodule in the right upper lobe. Calcified granuloma in the right apex. 4. Prominence of the pulmonary arteries which could represent pulmonary hypertension. 5. Atherosclerotic calcification in the aortic arch. **This report has been created using voice recognition software. It may contain minor errors which are inherent in voice recognition technology. **      Final report electronically signed by DR Evangelina Reese on 7/22/2021 8:23 AM      XR CHEST PORTABLE   Final Result   1. Normal heart size. No effusion seen. 2. Persistent findings of mild atelectasis/pneumonia versus postbiopsy changes in the right middle lobe, improved from earlier. Questionable mild atelectasis/pneumonia left lung base. 3. Suggestion of a persistent small less than 5% pneumothorax right apex versus overlying skin fold. 4. Follow-up chest x-ray recommended tomorrow morning. **This report has been created using voice recognition software.   It may contain minor errors which are inherent in voice recognition technology. **      Final report electronically signed by Dr. Corine Guadarrama on 7/21/2021 6:33 PM      XR CHEST PORTABLE   Final Result   1. Normal heart size. Moderate infiltrate in the right middle lobe following recent bronchoscopy, consistent with atelectasis/pneumonia versus post biopsy bleeding or retained fluid resulting from bronchial washings. 2. Tiny less than 5% pneumothorax right apex. 3. Small benign-appearing nodule in the right and left apical region, likely poorly calcified granulomas. **This report has been created using voice recognition software. It may contain minor errors which are inherent in voice recognition technology. **      Final report electronically signed by Dr. Corine Guadarrama on 7/21/2021 1:50 PM      CTA CHEST W WO CONTRAST   Final Result   1. No pulmonary emboli are seen. Questionable pulmonary arterial hypertension. 2. Subtotal collapse of the right middle lobe which could be due to mucous plug but cannot exclude other endobronchial pathology. No distinct mass lesion is seen, however. **This report has been created using voice recognition software. It may contain minor errors which are inherent in voice recognition technology. **          Final report electronically signed by Dr. Corine Guadarrama on 7/19/2021 10:04 PM      XR CHEST PORTABLE   Final Result   Impression:   There is increased groundglass alveolar opacity in the right heart margin    related to the medial segment of the right middle lobe. The lungs are    otherwise clear. The right middle lobe opacity is consistent with early acute middle lobe    alveolar pulmonary infection.       This document has been electronically signed by: Naveed Galo MD on    07/18/2021 04:26 AM      CT CHEST WO CONTRAST    (Results Pending)         Consults:     IP CONSULT TO PULMONOLOGY  PHARMACY TO DOSE MEDICATION  IP CONSULT TO SOCIAL WORK  PALLIATIVE CARE APRILAL  IP CONSULT TO INFECTIOUS DISEASES  IP CONSULT TO PHARMACY    Disposition:    [] Home        [] TCU       [x] Rehab       [] Psych       [] SNF       [] [place]       [] Other-    Condition at Discharge: fair    Code Status:  Full Code       Patient Instructions: Activity: activity as tolerated  Diet: ADULT DIET; Regular; 4 carb choices (60 gm/meal)    Follow-up visits:   SUSAN [address]  [facility] [address] [phone]      [facility] pat to follow    SEVERIANO Gonzalez - HENRY  [facility]  [address] [phone]    On 9/1/2021  at 3:00 PM- Ct Chest w/o 1-2 days prior to appt     [place] [address]  [phone]      As needed for home oxygen 4L, new BIPAP        Discharge Medications:        Medication List      START taking these medications    alogliptin 12.5 MG Tabs tablet  Commonly known as: NESINA  Take 1 tablet by mouth daily  Start taking on: July 28, 2021     Arformoterol Tartrate 15 MCG/2ML Nebu  Commonly known as: BROVANA  Take 2 mLs by nebulization 2 times daily     hydroCHLOROthiazide 25 MG tablet  Commonly known as: HYDRODIURIL  Take 1 tablet by mouth daily  Start taking on: July 28, 2021     lisinopril 20 MG tablet  Commonly known as: PRINIVIL;ZESTRIL  Take 1 tablet by mouth daily  Start taking on: July 28, 2021     traMADol 50 MG tablet  Commonly known as: ULTRAM  Take 1 tablet by mouth every 6 hours as needed for Pain for up to 3 days. voriconazole 200 MG tablet  Commonly known as: VFEND  Take 1 tablet by mouth every 12 hours        CHANGE how you take these medications    ipratropium-albuterol 0.5-2.5 (3) MG/3ML Soln nebulizer solution  Commonly known as: DUONEB  Inhale 3 mLs into the lungs every 4 hours as needed for Shortness of Breath  What changed: Another medication with the same name was removed. Continue taking this medication, and follow the directions you see here.      Roflumilast 250 MCG tablet  Commonly known as: DALIRESP  Take 1 tablet by mouth daily  What changed:   · medication strength  · how much to take        CONTINUE taking these medications    ADVIL PO     albuterol sulfate  (90 Base) MCG/ACT inhaler  Inhale 2 puffs into the lungs every 4 hours as needed for Wheezing or Shortness of Breath     aspirin 81 MG tablet     B-12 PO     fluticasone 50 MCG/ACT nasal spray  Commonly known as: Flonase  2 sprays by Nasal route daily     FUROSEMIDE PO     levocetirizine 5 MG tablet  Commonly known as: Xyzal  Take 1 tablet by mouth nightly     metFORMIN 500 MG tablet  Commonly known as: GLUCOPHAGE     metoprolol tartrate 25 MG tablet  Commonly known as: LOPRESSOR     MUCINEX D PO     pravastatin 10 MG tablet  Commonly known as: PRAVACHOL     therapeutic multivitamin-minerals tablet     vitamin D 50 MCG (2000 UT) Caps capsule        STOP taking these medications    budesonide 0.5 MG/2ML nebulizer suspension  Commonly known as: Pulmicort     lisinopril-hydroCHLOROthiazide 20-25 MG per tablet  Commonly known as: PRINZIDE;ZESTORETIC     Sodium Chloride-Sodium Bicarb 1.57 g Pack  Commonly known as: AYR Saline Nasal Rinse           Where to Get Your Medications      These medications were sent to [address] [place] [address], [address] [place] [address]    Phone: [phone]   · alogliptin 12.5 MG Tabs tablet  · hydroCHLOROthiazide 25 MG tablet  · lisinopril 20 MG tablet  · voriconazole 200 MG tablet     You can get these medications from any pharmacy    Bring a paper prescription for each of these medications  · traMADol 50 MG tablet     Information about where to get these medications is not yet available    Ask your nurse or doctor about these medications  · Arformoterol Tartrate 15 MCG/2ML Nebu  · Roflumilast 250 MCG tablet           Discharge Time:  Time spent on discharge is >30 minutes in the examination, evaluation, counseling, and review of medications and discharge plan. The hospital course was discussed with the patient and all questions and concerns were addressed at that time. The patient was in agreement with and verbalized understanding of the plan of care and had no additional questions or complaints. The patient was instructed to follow-up with any scheduled outpatient appointments or to report to the nearest Emergency Department if new or worsening symptoms should arise. Thank you SUSAN Elliott for the opportunity to be involved in this patient's care.     Signed:    Electronically signed by Nicko Franco DO on 7/27/2021 at 2:29 PM

## 2021-07-27 NOTE — CARE COORDINATION
7/27/21, 1:13 PM EDT    Patient goals/plan/ treatment preferences discussed by  and . Patient goals/plan/ treatment preferences reviewed with patient/ family. Patient/ family verbalize understanding of discharge plan and are in agreement with goal/plan/treatment preferences. Understanding was demonstrated using the teach back method. AVS provided by RN at time of discharge, which includes all necessary medical information pertaining to the patients current course of illness, treatment, post-discharge goals of care, and treatment preferences. Services After Discharge  Services At/After Discharge: Aide Services, Nursing Services, [facility], PT, In ambulette ([place])   IMM Letter  IMM Letter given to Patient/Family/Significant other/Guardian/POA/by[de-identified]   IMM Letter date given[de-identified] 07/27/21  IMM Letter time given[de-identified] 0827       Patient to discharge to [place] and Nursing today. Patient to transport by ambulette. Patient and family agreeable to discharge plan. SW educated patient that insurance may not cover transport. SW faxed transportation forms. RN made aware and discharge instructions placed on chart.

## 2021-07-28 ENCOUNTER — TELEPHONE (OUTPATIENT)
Dept: PULMONOLOGY | Age: 66
End: 2021-07-28

## 2021-07-28 LAB
ASPERGILLUS GALACTO AG: NORMAL
VIRAL CULTURE,RAPID,RESPIRATOR: NORMAL

## 2021-07-28 NOTE — TELEPHONE ENCOUNTER
Received a call from [address], patient is currently on voriconazole for mold in her lungs with no stop date. Was seen in the ER and discharged yesterday. The physician at [facility] states patient should probably be stopped on this after 10 days but they don't have orders. They are requesting a stop date and a call back to her nurse. Please advise.

## 2021-09-01 ENCOUNTER — OFFICE VISIT (OUTPATIENT)
Dept: PULMONOLOGY | Age: 66
End: 2021-09-01
Payer: COMMERCIAL

## 2021-09-01 VITALS
TEMPERATURE: 98.7 F | BODY MASS INDEX: 34.13 KG/M2 | DIASTOLIC BLOOD PRESSURE: 74 MMHG | OXYGEN SATURATION: 97 % | HEART RATE: 122 BPM | SYSTOLIC BLOOD PRESSURE: 136 MMHG | WEIGHT: 225.2 LBS | HEIGHT: 68 IN

## 2021-09-01 DIAGNOSIS — J96.12 CHRONIC RESPIRATORY FAILURE WITH HYPOXIA AND HYPERCAPNIA (HCC): ICD-10-CM

## 2021-09-01 DIAGNOSIS — Z92.89 HISTORY OF RECENT HOSPITALIZATION: ICD-10-CM

## 2021-09-01 DIAGNOSIS — J96.11 CHRONIC RESPIRATORY FAILURE WITH HYPOXIA AND HYPERCAPNIA (HCC): ICD-10-CM

## 2021-09-01 DIAGNOSIS — B44.9 ASPERGILLUS PNEUMONIA (HCC): ICD-10-CM

## 2021-09-01 DIAGNOSIS — J44.9 STAGE 4 VERY SEVERE COPD BY GOLD CLASSIFICATION (HCC): Primary | ICD-10-CM

## 2021-09-01 DIAGNOSIS — J30.89 ENVIRONMENTAL AND SEASONAL ALLERGIES: ICD-10-CM

## 2021-09-01 DIAGNOSIS — Z87.891 RECENTLY QUIT USING TOBACCO: ICD-10-CM

## 2021-09-01 PROCEDURE — 99215 OFFICE O/P EST HI 40 MIN: CPT | Performed by: NURSE PRACTITIONER

## 2021-09-01 RX ORDER — PROMETHAZINE HYDROCHLORIDE 12.5 MG/1
12.5 TABLET ORAL EVERY 6 HOURS PRN
COMMUNITY

## 2021-09-01 RX ORDER — GUAIFENESIN 600 MG/1
1200 TABLET, EXTENDED RELEASE ORAL 2 TIMES DAILY
COMMUNITY

## 2021-09-01 RX ORDER — AZITHROMYCIN 500 MG/1
500 TABLET, FILM COATED ORAL DAILY
Qty: 1 PACKET | Refills: 0 | Status: SHIPPED | OUTPATIENT
Start: 2021-09-01 | End: 2021-09-04

## 2021-09-01 RX ORDER — PREDNISONE 50 MG/1
50 TABLET ORAL DAILY
Qty: 5 TABLET | Refills: 0 | Status: SHIPPED | OUTPATIENT
Start: 2021-09-01 | End: 2021-09-06

## 2021-09-01 ASSESSMENT — ENCOUNTER SYMPTOMS
EYES NEGATIVE: 1
ABDOMINAL PAIN: 0
VOMITING: 0
WHEEZING: 1
NAUSEA: 0
CHEST TIGHTNESS: 1
COUGH: 1
SHORTNESS OF BREATH: 1
DIARRHEA: 0

## 2021-09-01 NOTE — PROGRESS NOTES
[facility]     Patient: Karissa Taylor, 73 y.o.   : 1955    Pt of Dr. Francisco Hilton     Chief Complaint   Patient presents with    Follow-up     COPD 4 month pulmonay  follow up. D/c'd from [facility] on 2021         TORO Shin is here for 4 month  follow up for COPD- hospital follow up   Presented to [facility] for SOB,elevated PCo2,  placed on BiPAP 26/10 initially and then . She was a transfer from [facility] inpatient unit to [facility] for potential bronchoscopy due to mucous plugging. Patient was intubated and extubated on 21 after being treated for 1 week of COPD exacerbation  Encouraged home use of Acapella at discharge  Qualified for home BiPAP  with 4LPM O2 bleed in - having trouble sleeping, mask is hurting her nose, pulls tight due to air leaks, but then hurts face due to being too tight.    Just got out  of rehab at [address] [place] [address] 3 days ago   Is feeling better than she did at time of hospitalization, SOB/cough/ wheeze back to baseline  More fatigue than normal and more tightness in chest than baseline   Is interested in pulmonary rehab and the [place] valve   Presents 97% on 4LPM POC   +  Aspergillus on BAL- took VFEND at discharge, not sure how long, pt took while at rehab facility, there is Telephone encounter that nursing home was to call Dr Roberta Aguayo for stop date, she is not curently taking it   Quit smoking in 2021  Current Inhalers:  Duoneb every 4 hours , Brovana nebs BID, Albuterol HFA PRN , Daliresp PO daily   Previous Inhalers: Symbicort , pulmicort nebs   Previously on daily prednisone 10 mg PO / day, not sure when this was discontinued but she is currently not taking     PSG - no DENISHA, but + nocturnal hypoxia  147.6 min < 88%     A1A- MM (normal)   Last Spirometry : 2020  FEV 1 24%  Last 6MWT: 2020- required 2LPM POC   Has had 1st COVID 19 vaccine     SOCIAL HISTORY:  Social History Tobacco Use    Smoking status: Former Smoker     Packs/day: 1.00     Years: 42.00     Pack years: 42.00     Start date: 3/22/1978     Quit date: 2021     Years since quittin.1    Smokeless tobacco: Never Used    Tobacco comment: 1 pack every 2-3 weeks   Substance Use Topics    Alcohol use: No    Drug use: No         CURRENT MEDICATIONS:  Current Outpatient Medications   Medication Sig Dispense Refill    POTASSIUM CHLORIDE PO Take by mouth      promethazine (PHENERGAN) 12.5 MG tablet Take 12.5 mg by mouth every 6 hours as needed for Nausea      guaiFENesin (MUCINEX) 600 MG extended release tablet Take 1,200 mg by mouth 2 times daily      predniSONE (DELTASONE) 50 MG tablet Take 1 tablet by mouth daily for 5 days -start at onset of COPD exacerbation.  Monitor blood sugars closely 5 tablet 0    azithromycin (ZITHROMAX) 500 MG tablet Take 1 tablet by mouth daily for 3 days - start at onset of COPD exacerbation 1 packet 0    Roflumilast (DALIRESP) 250 MCG tablet Take 1 tablet by mouth daily 90 tablet 3    Arformoterol Tartrate (BROVANA) 15 MCG/2ML NEBU Take 2 mLs by nebulization 2 times daily 120 mL 3    alogliptin (NESINA) 12.5 MG TABS tablet Take 1 tablet by mouth daily 60 tablet 1    lisinopril (PRINIVIL;ZESTRIL) 20 MG tablet Take 1 tablet by mouth daily 30 tablet 3    ipratropium-albuterol (DUONEB) 0.5-2.5 (3) MG/3ML SOLN nebulizer solution Inhale 3 mLs into the lungs every 4 hours as needed for Shortness of Breath 540 mL 11    albuterol sulfate  (90 Base) MCG/ACT inhaler Inhale 2 puffs into the lungs every 4 hours as needed for Wheezing or Shortness of Breath 1 Inhaler 11    levocetirizine (XYZAL) 5 MG tablet Take 1 tablet by mouth nightly 30 tablet 11    fluticasone (FLONASE) 50 MCG/ACT nasal spray 2 sprays by Nasal route daily 1 Bottle 11    aspirin 81 MG tablet Take 81 mg by mouth daily      metoprolol tartrate (LOPRESSOR) 25 MG tablet Take 25 mg by mouth 2 times daily      metFORMIN (GLUCOPHAGE) 500 MG tablet Take 500 mg by mouth 2 times daily (with meals) 1,000 mg at supper.  Cholecalciferol (VITAMIN D) 2000 units CAPS capsule Take by mouth      Cyanocobalamin (B-12 PO) Take by mouth      Multiple Vitamins-Minerals (THERAPEUTIC MULTIVITAMIN-MINERALS) tablet Take 1 tablet by mouth daily      pravastatin (PRAVACHOL) 10 MG tablet Take 10 mg by mouth daily      Ibuprofen (ADVIL PO) Take by mouth as needed      FUROSEMIDE PO Take 20 mg by mouth       voriconazole (VFEND) 200 MG tablet Take 1 tablet by mouth every 12 hours 180 tablet 0    hydroCHLOROthiazide (HYDRODIURIL) 25 MG tablet Take 1 tablet by mouth daily 30 tablet 3    Pseudoephedrine-Guaifenesin (MUCINEX D PO) Take by mouth as needed        No current facility-administered medications for this visit. Jourdan KENNY   Review of Systems   Constitutional: Positive for fatigue. Negative for activity change, appetite change, chills, fever and unexpected weight change. HENT: Negative. Eyes: Negative. Respiratory: Positive for cough, chest tightness, shortness of breath and wheezing. Cardiovascular: Negative for chest pain, palpitations and leg swelling. Gastrointestinal: Negative for abdominal pain, diarrhea, nausea and vomiting. Genitourinary: Negative. Musculoskeletal: Negative. Skin: Negative. Neurological: Negative. Hematological: Bruises/bleeds easily. Psychiatric/Behavioral: Negative for sleep disturbance and suicidal ideas. Physical exam   /74 (Site: Right Upper Arm, Position: Sitting)   Pulse 122   Temp 98.7 °F (37.1 °C)   Ht 5' 7.5\" (1.715 m)   Wt 225 lb 3.2 oz (102.2 kg)   SpO2 97% Comment: on 4 litrs Os poc  BMI 34.75 kg/m²      Wt Readings from Last 3 Encounters:   09/01/21 225 lb 3.2 oz (102.2 kg)   07/27/21 229 lb 4.8 oz (104 kg)   05/26/21 233 lb 3.2 oz (105.8 kg)     Physical Exam  Vitals and nursing note reviewed.    Constitutional:       General: She is not in acute distress. Appearance: She is well-developed and overweight. Interventions: Nasal cannula in place. Comments: Presents in wheelchair    HENT:      Mouth/Throat:      Lips: Pink. Mouth: Mucous membranes are moist.      Pharynx: Oropharynx is clear. No oropharyngeal exudate or posterior oropharyngeal erythema. Eyes:      Conjunctiva/sclera: Conjunctivae normal.   Neck:      Vascular: No JVD. Cardiovascular:      Rate and Rhythm: Normal rate and regular rhythm. Heart sounds: No murmur heard. No friction rub. Pulmonary:      Effort: Pulmonary effort is normal. No accessory muscle usage or respiratory distress. Breath sounds: Normal breath sounds. Decreased air movement (throughout all lung fields ) present. No wheezing, rhonchi or rales. Chest:      Chest wall: No tenderness. Musculoskeletal:      Right lower leg: No edema. Left lower leg: No edema. Skin:     General: Skin is warm and dry. Capillary Refill: Capillary refill takes less than 2 seconds. Nails: There is no clubbing. Neurological:      Mental Status: She is alert. Psychiatric:         Mood and Affect: Mood normal.         Behavior: Behavior normal.         Thought Content: Thought content normal.         Judgment: Judgment normal.          Test results   Lung Nodule Screening     [x] Qualifies    []Does not qualify   [] Declined    [x] Completed     XR CHEST (2 VW)   Final Result   1. No definite evidence for right sided pneumothorax. 2. Abnormal density in the right upper lobe which could represent atelectasis versus infiltrate. 3. 2.3 x 1.4 cm nodule in the right upper lobe. Calcified granuloma in the right apex. 4. Prominence of the pulmonary arteries which could represent pulmonary hypertension. 5. Atherosclerotic calcification in the aortic arch.           **This report has been created using voice recognition software.  It may contain minor errors which are inherent in voice recognition technology. **       Final report electronically signed by DR Patricia Galan on 7/22/2021 8:23 AM      2019             ECHO 1/27/2020      CTA chest 7/19/2021  FINDINGS:        Upper abdomen: Hepatic steatosis. Low-density nodule in each adrenal gland, likely the lateral adenomas 0.2 cm of the left 1.7 cm in the right.       Mediastinum and lorenza: No abnormally enlarged lymph nodes are seen in the mediastinum or hilar regions. There are a few small postinflammatory lymph nodes in the mediastinum. A few calcified lymph nodes are seen in the mediastinum and right hilum,    consistent with old, healed granulomatous disease.       Bones: No evidence for acute fracture or bone destruction.       Lungs: Minimal atelectatic/fibrotic stranding left lung base. Subtotal collapse right middle lobe. This could related to mucous plug or other endobronchial pathology. No distinct mass lesion is seen. Mild atelectatic/fibrotic stranding is seen in the    anteromedial aspect of each upper lobe.       Vascular: No pulmonary emboli are seen. There is no large vessel aneurysm or dissection. Note that there is prominence of right and left main pulmonary arteries. Cannot sleep pulmonary arterial hypertension.           Impression   1.  No pulmonary emboli are seen. Questionable pulmonary arterial hypertension. 2. Subtotal collapse of the right middle lobe which could be due to mucous plug but cannot exclude other endobronchial pathology. No distinct mass lesion is seen, however.                   **This report has been created using voice recognition software.  It may contain minor errors which are inherent in voice recognition technology. **           Final report electronically signed by Dr. Lety Mercado on 7/19/2021 10:04 PM            Ref. Wendy Braulio 7/23/2021 09:51   Source: Unknown L Radial   pH, Blood Gas Latest Ref Range: 7.35 - 7.45  7.37   PCO2 Latest Ref Range: 35 - 45 mmhg 80 (HH)   pO2 Latest Ref Range: 71 - 104 mmhg 65 (L)   HCO3 Latest Ref Range: 23 - 28 mmol/l 46 (H)   Base Excess (Calculated) Latest Ref Range: -2.5 - 2.5 mmol/l 17.4 (H)   O2 Sat Latest Units: % 90   Robert Test Unknown Positive   IFIO2 Unknown 4   DEVICE Unknown Cannula   COLLECTED BY: Unknown [id number]     Assessment      Diagnosis Orders   1. Stage 4 very severe COPD by GOLD classification ([facility] [address]  External Referral To Pulmonary Rehab    XR CHEST (2 VW)    azithromycin (ZITHROMAX) 500 MG tablet    Amb External Referral To Pulmonology    Full PFT Study With Bronchodilator   2. Chronic respiratory failure with hypoxia and hypercapnia (HCC)  Amb External Referral To Pulmonology    Full PFT Study With Bronchodilator   3. History of recent hospitalization  Amb External Referral To Pulmonology   4. Aspergillus pneumonia ([facility]      treated with VFEND for unclear amount of time    5. Recently quit using tobacco     6. Environmental and seasonal allergies            Plan    -most recent PFT does not meet [place] qualifications based on TLC/ RV - lower than required Nov 2020  -recommend repeat PFT in 3 months   -continue Brovana nebs/ Duoneb/ Daliresp. Unclear why she is no long on ICS , will continue to monitor symptoms  -+ aspergillus pneumonia, symptoms improving treated with VFEND  -pt interested in referral to tertiary center for evaluation of EBV vs. LVRS vs transplant  -continue to abstain from smoking, has to remain tobacco free to qualify for any of these above procedures. -continue home BiPAP at 24/8 with 4LPM O2 bleed in, work with DME regarding mask fit.  Educated on health risks of non compliance including Co2 retention, and risk of death from elevated CO2 levels  -continue Home O2 at 4LPM NC ATC  -needs to get 2nd COVID 19 vaccine  -recommend flu vaccine this fall when available  -avoid ill contacts  -re - refer to pulmonary rehab- CXR prior per protocol     Total time spent on encounter was 1 hour     Will see Mario Done in: 3

## 2021-09-07 LAB — AFB CULTURE & SMEAR: NORMAL

## 2021-09-24 LAB
ALBUMIN SERPL-MCNC: 3.2 G/DL
ALP BLD-CCNC: 106 U/L
ALT SERPL-CCNC: 44 U/L
ANION GAP SERPL CALCULATED.3IONS-SCNC: 10 MMOL/L
AST SERPL-CCNC: 22 U/L
BILIRUB SERPL-MCNC: 0.2 MG/DL (ref 0.1–1.4)
BUN BLDV-MCNC: 19 MG/DL
CALCIUM SERPL-MCNC: 9.8 MG/DL
CHLORIDE BLD-SCNC: 101 MMOL/L
CO2: 35 MMOL/L
CREAT SERPL-MCNC: 1.5 MG/DL
EGFR: 37
GLUCOSE BLD-MCNC: 140 MG/DL
POTASSIUM SERPL-SCNC: 4.2 MMOL/L
SODIUM BLD-SCNC: 142 MMOL/L
TOTAL PROTEIN: 7.3

## 2021-10-04 LAB
ALBUMIN SERPL-MCNC: 3.5 G/DL
ALP BLD-CCNC: 92 U/L
ALT SERPL-CCNC: 47 U/L
ANION GAP SERPL CALCULATED.3IONS-SCNC: 11 MMOL/L
AST SERPL-CCNC: 19 U/L
BILIRUB SERPL-MCNC: 0.3 MG/DL (ref 0.1–1.4)
BUN BLDV-MCNC: 44 MG/DL
CALCIUM SERPL-MCNC: 10.5 MG/DL
CHLORIDE BLD-SCNC: 99 MMOL/L
CO2: 34 MMOL/L
CREAT SERPL-MCNC: 1.6 MG/DL
EGFR: 34
GLUCOSE BLD-MCNC: 128 MG/DL
POTASSIUM SERPL-SCNC: 4.7 MMOL/L
SODIUM BLD-SCNC: 139 MMOL/L
TOTAL PROTEIN: 7

## 2021-10-18 ENCOUNTER — TELEPHONE (OUTPATIENT)
Dept: PULMONOLOGY | Age: 66
End: 2021-10-18

## 2021-10-18 NOTE — TELEPHONE ENCOUNTER
[facility] pulmonary rehab [phone] is calling to see if there are any parameters for her as far as working her out.  Her resting heart rate is always in the 120's and when working out goes into the 140's, she still is SOB but they do not know if this is normal.

## 2021-10-20 NOTE — TELEPHONE ENCOUNTER
Jamin Doyle @ [facility] pulmonary rehab and relayed message, she will contact the patient. Phone message faxed to Serjio.

## 2021-11-02 ENCOUNTER — TELEPHONE (OUTPATIENT)
Dept: PULMONOLOGY | Age: 66
End: 2021-11-02

## 2021-11-02 NOTE — TELEPHONE ENCOUNTER
Patient is scheduled for an appt on 12/22/2021, but she was in the hospital for 49 days between [facility], [facility] and [facility]. She said she had a collapsed lung and was on a ventilator and she said she would feel better if she could get in with her pulmonary dr sooner than 12/22.  DOLV 09/01/2021  Please advise  [phone]

## 2021-11-15 DIAGNOSIS — J96.11 CHRONIC RESPIRATORY FAILURE WITH HYPOXIA AND HYPERCAPNIA (HCC): ICD-10-CM

## 2021-11-15 DIAGNOSIS — J96.12 CHRONIC RESPIRATORY FAILURE WITH HYPOXIA AND HYPERCAPNIA (HCC): ICD-10-CM

## 2021-11-15 DIAGNOSIS — J44.9 STAGE 4 VERY SEVERE COPD BY GOLD CLASSIFICATION (HCC): ICD-10-CM

## 2021-12-22 ENCOUNTER — OFFICE VISIT (OUTPATIENT)
Dept: PULMONOLOGY | Age: 66
End: 2021-12-22
Payer: COMMERCIAL

## 2021-12-22 VITALS
DIASTOLIC BLOOD PRESSURE: 82 MMHG | HEIGHT: 68 IN | HEART RATE: 106 BPM | OXYGEN SATURATION: 96 % | BODY MASS INDEX: 36.28 KG/M2 | TEMPERATURE: 99 F | WEIGHT: 239.4 LBS | SYSTOLIC BLOOD PRESSURE: 138 MMHG

## 2021-12-22 DIAGNOSIS — J30.89 ENVIRONMENTAL AND SEASONAL ALLERGIES: ICD-10-CM

## 2021-12-22 DIAGNOSIS — E66.9 OBESITY (BMI 30-39.9): ICD-10-CM

## 2021-12-22 DIAGNOSIS — J96.12 CHRONIC RESPIRATORY FAILURE WITH HYPOXIA AND HYPERCAPNIA (HCC): ICD-10-CM

## 2021-12-22 DIAGNOSIS — J96.11 CHRONIC RESPIRATORY FAILURE WITH HYPOXIA AND HYPERCAPNIA (HCC): ICD-10-CM

## 2021-12-22 DIAGNOSIS — R53.81 PHYSICAL DECONDITIONING: ICD-10-CM

## 2021-12-22 DIAGNOSIS — Z87.891 RECENTLY QUIT USING TOBACCO: ICD-10-CM

## 2021-12-22 DIAGNOSIS — J44.9 STAGE 4 VERY SEVERE COPD BY GOLD CLASSIFICATION (HCC): Primary | ICD-10-CM

## 2021-12-22 PROCEDURE — 99215 OFFICE O/P EST HI 40 MIN: CPT | Performed by: NURSE PRACTITIONER

## 2021-12-22 RX ORDER — CIPROFLOXACIN/HYDROCORTISONE 0.2 %-1 %
3 SUSPENSION, DROPS(FINAL DOSAGE FORM)(ML) OTIC (EAR) 2 TIMES DAILY
Qty: 10 ML | Refills: 0 | Status: SHIPPED | OUTPATIENT
Start: 2021-12-22 | End: 2021-12-29

## 2021-12-22 RX ORDER — PREDNISONE 50 MG/1
50 TABLET ORAL DAILY
Qty: 5 TABLET | Refills: 0 | Status: SHIPPED | OUTPATIENT
Start: 2021-12-22 | End: 2021-12-27

## 2021-12-22 RX ORDER — AZITHROMYCIN 500 MG/1
500 TABLET, FILM COATED ORAL DAILY
Qty: 1 PACKET | Refills: 0 | Status: SHIPPED | OUTPATIENT
Start: 2021-12-22 | End: 2021-12-25

## 2021-12-22 RX ORDER — ARFORMOTEROL TARTRATE 15 UG/2ML
15 SOLUTION RESPIRATORY (INHALATION) 2 TIMES DAILY
Qty: 120 ML | Refills: 3 | Status: SHIPPED | OUTPATIENT
Start: 2021-12-22 | End: 2022-06-07

## 2021-12-22 RX ORDER — CETIRIZINE HYDROCHLORIDE 10 MG/1
10 TABLET ORAL DAILY
COMMUNITY

## 2021-12-22 ASSESSMENT — ENCOUNTER SYMPTOMS
CHEST TIGHTNESS: 1
COUGH: 1
WHEEZING: 1
EYES NEGATIVE: 1
SHORTNESS OF BREATH: 1

## 2021-12-22 NOTE — PROGRESS NOTES
[facility]     Patient: Aruna Higginbotham, 73 y.o.   : 2021    Pt of Dr. Crystal Zuluaga   Patient presents with    Follow-up     COPD 3 month pulmonary follow up PFT 11/10/21        HPI  Kristy Gallo is here for 3 month follow up for COPD  Prescribed zithromax and prednisone last visit for COPD exacerbation took 1 months  Asking for refills on these due to increased mucous and change in color of mucous to yellow.   + headaches, rhinorrhea, body aches,   Quit smoking 2021, did have a lapse and smoked 2 cigarettes since then   Not currently smoking   She spoke to intake at [address] [place] [address] and was told she has to get below 200 lbs before they will see her for any lung surgery/ transplant   Gradual worsening of her SOB started a few weeks ago, after getting 2nd COVID vaccine. States she developed welt/ rash around the injection site   \"fights\" with BiPAP mask fit. Is using the best she can during the night. Has used during the day but seldomly   Has been taken to ER from pulmonary rehab  For breathing issues   Muccinex, Brovana, Duoneb, Daliresp PO   Chronic right lower extremity swelling and pain, swelling goes down with elevation   Did see a dietician through pulmonary rehab, unable to cook for herself       Interval History   Presented to [facility] for SOB,elevated PCo2,  placed on BiPAP 26/10 initially and then 24/8. She was a transfer from [facility] inpatient unit to [facility] for potential bronchoscopy due to mucous plugging. Patient was intubated and extubated on 21 after being treated for 1 week of COPD exacerbation  Encouraged home use of Acapella at discharge  Qualified for home BiPAP 24/8 with 4LPM O2 bleed in - having trouble sleeping, mask is hurting her nose, pulls tight due to air leaks, but then hurts face due to being too tight.    Just got out  of rehab at [address] [place] [address] home 3 days ago   Is feeling better than she did at time of hospitalization, SOB/cough/ wheeze back to baseline  More fatigue than normal and more tightness in chest than baseline   Is interested in pulmonary rehab and the [place] valve   Presents 97% on 4LPM POC   +  Aspergillus on BAL- took VFEND at discharge, not sure how long, pt took while at rehab facility, there is Telephone encounter that nursing [place] was to call Dr [address] for stop date, she is not curently taking it   Quit smoking in 2021  Current Inhalers:  Duoneb every 4 hours , Brovana nebs BID, Albuterol HFA PRN , Daliresp PO daily   Previous Inhalers: Symbicort , pulmicort nebs   Previously on daily prednisone 10 mg PO / day, not sure when this was discontinued but she is currently not taking      PSG - no DENISHA, but + nocturnal hypoxia  147.6 min < 88%      A1A- MM (normal)   Last Spirometry : 2020  FEV 1 24%  Last 6MWT: 2020- required 2LPM POC   Has had 1st COVID 19 vaccine   On 4LPM O2 ATC including at night bleed into PAP .           SOCIAL HISTORY:  Social History     Tobacco Use    Smoking status: Former Smoker     Packs/day: 1.00     Years: 42.00     Pack years: 42.00     Start date: 3/22/1978     Quit date: 2021     Years since quittin.4    Smokeless tobacco: Never Used    Tobacco comment: 1 pack every 2-3 weeks   Substance Use Topics    Alcohol use: No    Drug use: No         CURRENT MEDICATIONS:  Current Outpatient Medications   Medication Sig Dispense Refill    cetirizine (ZYRTEC) 10 MG tablet Take 10 mg by mouth daily      azithromycin (ZITHROMAX) 500 MG tablet Take 1 tablet by mouth daily for 3 days Start taking at onset of COPD exacerbation 1 packet 0    predniSONE (DELTASONE) 50 MG tablet Take 1 tablet by mouth daily for 5 days Start taking at onset of COPD exacerbation 5 tablet 0    Roflumilast (DALIRESP) 250 MCG tablet Take 1 tablet by mouth daily 90 tablet 3    Arformoterol Tartrate (BROVANA) 15 MCG/2ML NEBU Take 2 mLs by nebulization 2 times daily 120 mL 3    ciprofloxacin-hydrocortisone (CIPRO HC) 0.2-1 % otic suspension Place 3 drops into the right ear 2 times daily for 7 days 10 mL 0    POTASSIUM CHLORIDE PO Take by mouth      promethazine (PHENERGAN) 12.5 MG tablet Take 12.5 mg by mouth every 6 hours as needed for Nausea      guaiFENesin (MUCINEX) 600 MG extended release tablet Take 1,200 mg by mouth 2 times daily      alogliptin (NESINA) 12.5 MG TABS tablet Take 1 tablet by mouth daily 60 tablet 1    lisinopril (PRINIVIL;ZESTRIL) 20 MG tablet Take 1 tablet by mouth daily 30 tablet 3    hydroCHLOROthiazide (HYDRODIURIL) 25 MG tablet Take 1 tablet by mouth daily 30 tablet 3    ipratropium-albuterol (DUONEB) 0.5-2.5 (3) MG/3ML SOLN nebulizer solution Inhale 3 mLs into the lungs every 4 hours as needed for Shortness of Breath 540 mL 11    albuterol sulfate  (90 Base) MCG/ACT inhaler Inhale 2 puffs into the lungs every 4 hours as needed for Wheezing or Shortness of Breath 1 Inhaler 11    levocetirizine (XYZAL) 5 MG tablet Take 1 tablet by mouth nightly 30 tablet 11    fluticasone (FLONASE) 50 MCG/ACT nasal spray 2 sprays by Nasal route daily 1 Bottle 11    aspirin 81 MG tablet Take 81 mg by mouth daily      metoprolol tartrate (LOPRESSOR) 25 MG tablet Take 25 mg by mouth 2 times daily      metFORMIN (GLUCOPHAGE) 500 MG tablet Take 500 mg by mouth 2 times daily (with meals) 1,000 mg at supper.       Cholecalciferol (VITAMIN D) 2000 units CAPS capsule Take by mouth      Cyanocobalamin (B-12 PO) Take by mouth      Multiple Vitamins-Minerals (THERAPEUTIC MULTIVITAMIN-MINERALS) tablet Take 1 tablet by mouth daily      pravastatin (PRAVACHOL) 10 MG tablet Take 10 mg by mouth daily      Pseudoephedrine-Guaifenesin (MUCINEX D PO) Take by mouth as needed       Ibuprofen (ADVIL PO) Take by mouth as needed      FUROSEMIDE PO Take 80 mg by mouth       voriconazole (VFEND) 200 MG tablet Take 1 tablet by mouth every 12 hours 180 tablet 0     No current facility-administered medications for this visit. Beto KENNY   Review of Systems   Constitutional: Positive for activity change and fatigue. Negative for unexpected weight change. HENT: Positive for congestion and ear pain. Eyes: Negative. Respiratory: Positive for cough, chest tightness, shortness of breath and wheezing. Cardiovascular: Positive for leg swelling. Endocrine: Negative. Genitourinary: Negative. Musculoskeletal: Positive for arthralgias and myalgias. Skin: Negative. Neurological: Positive for headaches. Hematological: Bruises/bleeds easily. Psychiatric/Behavioral: Positive for sleep disturbance. The patient is nervous/anxious. Physical exam   /82 (Site: Right Upper Arm, Position: Sitting)   Pulse 106   Temp 99 °F (37.2 °C)   Ht 5' 7.5\" (1.715 m)   Wt 239 lb 6.4 oz (108.6 kg)   SpO2 96% Comment: on 4 liters o2  BMI 36.94 kg/m²      Wt Readings from Last 3 Encounters:   12/22/21 239 lb 6.4 oz (108.6 kg)   09/01/21 225 lb 3.2 oz (102.2 kg)   07/27/21 229 lb 4.8 oz (104 kg)     Physical Exam  Vitals and nursing note reviewed. Constitutional:       General: She is not in acute distress. Appearance: She is well-developed. She is obese. Interventions: Nasal cannula in place. Comments: Nasal cannula to POC   HENT:      Mouth/Throat:      Lips: Pink. Mouth: Mucous membranes are moist.      Pharynx: Oropharynx is clear. No oropharyngeal exudate or posterior oropharyngeal erythema. Eyes:      Conjunctiva/sclera: Conjunctivae normal.   Neck:      Vascular: No JVD. Cardiovascular:      Rate and Rhythm: Normal rate and regular rhythm. Heart sounds: No murmur heard. No friction rub. Pulmonary:      Effort: Pulmonary effort is normal. Prolonged expiration present. No accessory muscle usage or respiratory distress. Breath sounds: Normal breath sounds. Decreased air movement (poor air flow in all lung fields ) present.  No prednisone PRN for COPD exac   -avoid ill contacts  -keep UTD with vaccinations   -not candidate for lung transplant/ [place] until looses weight       Total time spent on encounter was 1 hour    Will see Marely Enamorado in: 3 months    Electronically signed by SEVERIANO Curiel - CNP on 12/22/2021 at 2:25 PM

## 2021-12-23 ENCOUNTER — TELEPHONE (OUTPATIENT)
Dept: PULMONOLOGY | Age: 66
End: 2021-12-23

## 2021-12-23 RX ORDER — CIPROFLOXACIN AND DEXAMETHASONE 3; 1 MG/ML; MG/ML
4 SUSPENSION/ DROPS AURICULAR (OTIC) 2 TIMES DAILY
Qty: 7.5 ML | Refills: 0 | Status: SHIPPED | OUTPATIENT
Start: 2021-12-23 | End: 2021-12-30

## 2021-12-23 NOTE — TELEPHONE ENCOUNTER
Insurance does not cover Cipro HC, they will cover Neomycin-Polymyxin HC, Ciprofloxacin-Dexamethasone, Ofloxaxcin, Hydrocortisone, Ciprofloxacin

## 2022-03-16 ENCOUNTER — OFFICE VISIT (OUTPATIENT)
Dept: PULMONOLOGY | Age: 67
End: 2022-03-16
Payer: COMMERCIAL

## 2022-03-16 VITALS
HEIGHT: 68 IN | TEMPERATURE: 97.8 F | OXYGEN SATURATION: 93 % | WEIGHT: 243.8 LBS | SYSTOLIC BLOOD PRESSURE: 126 MMHG | DIASTOLIC BLOOD PRESSURE: 68 MMHG | HEART RATE: 126 BPM | BODY MASS INDEX: 36.95 KG/M2

## 2022-03-16 DIAGNOSIS — R53.81 PHYSICAL DECONDITIONING: ICD-10-CM

## 2022-03-16 DIAGNOSIS — Z87.891 PERSONAL HISTORY OF TOBACCO USE: ICD-10-CM

## 2022-03-16 DIAGNOSIS — I50.32 CHRONIC DIASTOLIC HEART FAILURE (HCC): ICD-10-CM

## 2022-03-16 DIAGNOSIS — E66.9 OBESITY (BMI 30-39.9): ICD-10-CM

## 2022-03-16 DIAGNOSIS — Z79.899 POLYPHARMACY: ICD-10-CM

## 2022-03-16 DIAGNOSIS — J44.1 COPD EXACERBATION (HCC): Primary | ICD-10-CM

## 2022-03-16 DIAGNOSIS — J44.9 STAGE 4 VERY SEVERE COPD BY GOLD CLASSIFICATION (HCC): ICD-10-CM

## 2022-03-16 DIAGNOSIS — J96.11 CHRONIC RESPIRATORY FAILURE WITH HYPOXIA (HCC): ICD-10-CM

## 2022-03-16 DIAGNOSIS — F17.218 CIGARETTE NICOTINE DEPENDENCE WITH OTHER NICOTINE-INDUCED DISORDER: ICD-10-CM

## 2022-03-16 PROBLEM — E78.2 MIXED HYPERLIPIDEMIA: Status: ACTIVE | Noted: 2019-11-06

## 2022-03-16 PROBLEM — M25.9 DISORDER OF JOINT: Status: ACTIVE | Noted: 2022-03-16

## 2022-03-16 PROBLEM — I10 HYPERTENSION, ESSENTIAL: Status: ACTIVE | Noted: 2019-11-06

## 2022-03-16 PROBLEM — E88.819 INSULIN RESISTANCE: Status: ACTIVE | Noted: 2019-11-06

## 2022-03-16 PROBLEM — J96.10 CHRONIC RESPIRATORY FAILURE (HCC): Status: ACTIVE | Noted: 2019-11-06

## 2022-03-16 PROBLEM — E88.81 INSULIN RESISTANCE: Status: ACTIVE | Noted: 2019-11-06

## 2022-03-16 PROBLEM — R60.0 LOCALIZED EDEMA: Status: ACTIVE | Noted: 2019-11-06

## 2022-03-16 PROBLEM — I50.30 DIASTOLIC HEART FAILURE (HCC): Status: ACTIVE | Noted: 2022-03-16

## 2022-03-16 PROCEDURE — G0296 VISIT TO DETERM LDCT ELIG: HCPCS | Performed by: NURSE PRACTITIONER

## 2022-03-16 PROCEDURE — 99215 OFFICE O/P EST HI 40 MIN: CPT | Performed by: NURSE PRACTITIONER

## 2022-03-16 RX ORDER — AZITHROMYCIN 500 MG/1
500 TABLET, FILM COATED ORAL DAILY
Qty: 1 PACKET | Refills: 0 | Status: SHIPPED | OUTPATIENT
Start: 2022-03-16 | End: 2022-03-19

## 2022-03-16 RX ORDER — PREDNISONE 20 MG/1
40 TABLET ORAL DAILY
Qty: 10 TABLET | Refills: 0 | Status: SHIPPED | OUTPATIENT
Start: 2022-03-16 | End: 2022-03-21

## 2022-03-16 ASSESSMENT — ENCOUNTER SYMPTOMS
BACK PAIN: 1
SHORTNESS OF BREATH: 1
DIARRHEA: 0
COUGH: 1
VOMITING: 0
WHEEZING: 1
ABDOMINAL PAIN: 0
NAUSEA: 0
EYES NEGATIVE: 1

## 2022-03-16 NOTE — PATIENT INSTRUCTIONS
Hydrochlorothiazide 25 mg daily was on your med list last time, now not on your med list.  This is often prescribed for hypertension ( high blood pressure) also works as a water pill , you should check with your family physician if this is something you should be taking        What is lung cancer screening? Lung cancer screening is a way in which doctors check the lungs for early signs of cancer in people who have no symptoms of lung cancer. A low-dose CT scan uses much less radiation than a normal CT scan and shows a more detailed image of the lungs than a standard X-ray. The goal of lung cancer screening is to find cancer early, before it has a chance to grow, spread, or cause problems. One large study found that smokers who were screened with low-dose CT scans were less likely to die of lung cancer than those who were screened with standard X-ray. Below is a summary of the things you need to know regarding screening for lung cancer with low-dose computed tomography (LDCT). This is a screening program that involves routine annual screening with LDCT studies of the lung. The LDCTs are done using low-dose radiation that is not thought to increase your cancer risk. If you have other serious medical conditions (other cancers, congestive heart failure) that limit your life expectancy to less than 10 years, you should not undergo lung cancer screening with LDCT. The chance is 20%-60% that the LDCT result will show abnormalities. This would require additional testing which could include repeat imaging or even invasive procedures. Most (about 95%) of \"abnormal\" LDCT results are false in the sense that no lung cancer is ultimately found. Additionally, some (about 10%) of the cancers found would not affect your life expectancy, even if undetected and untreated. If you are still smoking, the single most important thing that you can do to reduce your risk of dying of lung cancer is to quit.     For this screening to be covered by Medicare and most other insurers, strict criteria must be met. If you do not meet these criteria, but still wish to undergo LDCT testing, you will be required to sign a waiver indicating your willingness to pay for the scan.

## 2022-03-16 NOTE — PROGRESS NOTES
[facility]     Patient: Markell Perry, 78 y.o.   : 1955  3/16/2022    Pt of Dr. Elise Marcum   Patient presents with    Follow-up     COPD 3 month pulmonary follow up with no testing        HPI  Linda Mroris is here for 3 months follow up for COPD- stage 4 COPD   Current Inhalers: Muccinex, Brovana, Duoneb, Daliresp PO     Previous Inhalers:  Symbicort , pulmicort nebs   Previously on daily prednisone 10 mg PO / day, not sure when this was discontinued but she is currently not taking     Increased leg swelling, redness to lower ext. - weight Is up    and son do the cooking . Is considering hello fresh or other pre made meals     Thick white mucous , more SOB / chest tigtness x 2 weeks    Recently went through [facility] - \" I quit\" was making her too exhausted. Has been trying to incorporate the exercises at home     Any recent exacerbations that have required oral atb or steroids Yes- in Dec at last visit.  - asks for refills of emergency meds every 1-2 months   Any new medical issues since last visit : No    Patient is on home oxygen therapy: 4LPM POC 94%  On BiPAP  at night for chronic respiratory failure , nocturnal hypoxia on PSG   Current Oxygen order: 4LPM ATC including into PAP   Last 6 min walk:2020  is  using O2 compliantly     Personal history of COVID 19 No   Internal Administration   First Dose      Second Dose           Last COVID Lab SARS-CoV-2, NAAT (no units)   Date Value   2021 NOT DETECTED         She spoke to intake at [facility] and was told she has to get below 200 lbs before they will see her for any lung surgery/ transplant      SOCIAL HISTORY:  Social History     Tobacco Use    Smoking status: Former Smoker     Packs/day: 1.00     Years: 42.00     Pack years: 42.00     Start date: 3/22/1978     Quit date: 2021     Years since quittin.7    Smokeless tobacco: Never Used    Tobacco comment: 1 pack every 2-3 weeks   Substance Use Topics    Alcohol use: No    Drug use: No         CURRENT MEDICATIONS:  Current Outpatient Medications   Medication Sig Dispense Refill    azithromycin (ZITHROMAX) 500 MG tablet Take 1 tablet by mouth daily for 3 days 1 packet 0    predniSONE (DELTASONE) 20 MG tablet Take 2 tablets by mouth daily for 5 days 10 tablet 0    cetirizine (ZYRTEC) 10 MG tablet Take 10 mg by mouth daily      Roflumilast (DALIRESP) 250 MCG tablet Take 1 tablet by mouth daily 90 tablet 3    Arformoterol Tartrate (BROVANA) 15 MCG/2ML NEBU Take 2 mLs by nebulization 2 times daily 120 mL 3    POTASSIUM CHLORIDE PO Take by mouth      guaiFENesin (MUCINEX) 600 MG extended release tablet Take 1,200 mg by mouth 2 times daily      lisinopril (PRINIVIL;ZESTRIL) 20 MG tablet Take 1 tablet by mouth daily 30 tablet 3    ipratropium-albuterol (DUONEB) 0.5-2.5 (3) MG/3ML SOLN nebulizer solution Inhale 3 mLs into the lungs every 4 hours as needed for Shortness of Breath 540 mL 11    albuterol sulfate  (90 Base) MCG/ACT inhaler Inhale 2 puffs into the lungs every 4 hours as needed for Wheezing or Shortness of Breath 1 Inhaler 11    fluticasone (FLONASE) 50 MCG/ACT nasal spray 2 sprays by Nasal route daily 1 Bottle 11    aspirin 81 MG tablet Take 81 mg by mouth daily      metoprolol tartrate (LOPRESSOR) 25 MG tablet Take 50 mg by mouth 2 times daily       metFORMIN (GLUCOPHAGE) 500 MG tablet Take 500 mg by mouth 2 times daily (with meals) 1,000 mg at supper.       Cholecalciferol (VITAMIN D) 2000 units CAPS capsule Take by mouth      Cyanocobalamin (B-12 PO) Take by mouth      Multiple Vitamins-Minerals (THERAPEUTIC MULTIVITAMIN-MINERALS) tablet Take 1 tablet by mouth daily      pravastatin (PRAVACHOL) 10 MG tablet Take 10 mg by mouth daily      FUROSEMIDE PO Take 80 mg by mouth       promethazine (PHENERGAN) 12.5 MG tablet Take 12.5 mg by mouth every 6 hours as needed for Nausea (Patient not taking: Reported on 3/16/2022)      alogliptin (NESINA) 12.5 MG TABS tablet Take 1 tablet by mouth daily (Patient not taking: Reported on 3/16/2022) 60 tablet 1    voriconazole (VFEND) 200 MG tablet Take 1 tablet by mouth every 12 hours (Patient not taking: Reported on 3/16/2022) 180 tablet 0    hydroCHLOROthiazide (HYDRODIURIL) 25 MG tablet Take 1 tablet by mouth daily (Patient not taking: Reported on 3/16/2022) 30 tablet 3    levocetirizine (XYZAL) 5 MG tablet Take 1 tablet by mouth nightly (Patient not taking: Reported on 3/16/2022) 30 tablet 11    Pseudoephedrine-Guaifenesin (MUCINEX D PO) Take by mouth as needed       Ibuprofen (ADVIL PO) Take by mouth as needed       No current facility-administered medications for this visit. Jesus KENNY   Review of Systems   Constitutional: Positive for fatigue. Negative for activity change, appetite change, chills, fever and unexpected weight change. HENT: Negative. Eyes: Negative. Respiratory: Positive for cough, shortness of breath and wheezing. Cardiovascular: Positive for leg swelling. Negative for chest pain and palpitations. Gastrointestinal: Negative for abdominal pain, diarrhea, nausea and vomiting. Genitourinary: Positive for flank pain and frequency. Negative for difficulty urinating and dysuria. Musculoskeletal: Positive for arthralgias and back pain (LBP). Skin: Negative. Neurological: Positive for weakness. Hematological: Negative. Psychiatric/Behavioral: The patient is nervous/anxious. Physical exam   /68 (Site: Left Upper Arm, Position: Sitting)   Pulse 126   Temp 97.8 °F (36.6 °C)   Ht 5' 7.5\" (1.715 m)   Wt 243 lb 12.8 oz (110.6 kg)   SpO2 93% Comment: on 4 liters o2  BMI 37.62 kg/m²      Wt Readings from Last 3 Encounters:   03/16/22 243 lb 12.8 oz (110.6 kg)   12/22/21 239 lb 6.4 oz (108.6 kg)   09/01/21 225 lb 3.2 oz (102.2 kg)     Physical Exam  Vitals and nursing note reviewed. Constitutional:       General: She is not in acute distress. Appearance: She is well-developed. She is obese. HENT:      Mouth/Throat:      Lips: Pink. Mouth: Mucous membranes are moist.      Pharynx: Oropharynx is clear. No oropharyngeal exudate or posterior oropharyngeal erythema. Eyes:      Conjunctiva/sclera: Conjunctivae normal.   Neck:      Vascular: No JVD. Cardiovascular:      Rate and Rhythm: Normal rate and regular rhythm. Heart sounds: No murmur heard. No friction rub. Pulmonary:      Effort: Pulmonary effort is normal. No accessory muscle usage or respiratory distress. Breath sounds: Decreased air movement present. Examination of the right-middle field reveals rales. Examination of the left-lower field reveals rales. Rales present. No wheezing or rhonchi. Chest:      Chest wall: No tenderness. Musculoskeletal:      Right lower leg: Edema present. Left lower leg: Edema present. Skin:     General: Skin is warm and dry. Capillary Refill: Capillary refill takes less than 2 seconds. Nails: There is no clubbing. Neurological:      Mental Status: She is alert and oriented to person, place, and time. Psychiatric:         Mood and Affect: Mood normal.         Behavior: Behavior normal.         Thought Content: Thought content normal.         Judgment: Judgment normal.          Test results     Lung Nodule Screening     [x]? Qualifies    []? Does not qualify   []? Declined    [x]? Completed 2019                       Assessment      Diagnosis Orders   1. COPD exacerbation (HCC)  azithromycin (ZITHROMAX) 500 MG tablet   2. Stage 4 very severe COPD by GOLD classification ([address]     3. Cigarette nicotine dependence with other nicotine-induced disorder  CT LUNG SCREENING    WA VISIT TO DISCUSS LUNG CA SCREEN W LDCT    CT Lung Screen (Annual)   4. Personal history of tobacco use  WA VISIT TO DISCUSS LUNG CA SCREEN W LDCT    CT Lung Screen (Annual)   5.  Polypharmacy 6. Obesity (BMI 30-39.9)     7. Chronic respiratory failure with hypoxia (HCC)     8. Physical deconditioning     9. Chronic diastolic heart failure (HCC)       Plan    -has to loose weight to be candidate for any invasive lung interventions, discussed ways to get physical activity and dieting at home , refuses to go back to pulmonary rehab   -continue oxygen 4LPM NC   -continue BiPAP at night   -COPD suboptimally controlled: is on good medication regiman, questionable if always compliant . Continue current nebs/ inhalers as prescribed  -continue Daliresp PO daily   -may have to go back to daily prednisone, although concerned this will worsen her leg swelling along with other neg effects with chronic steroid use   -schedule annual LDCT   -avoid ill contacts  -keep UTD on recommended vaccinations    Instructions to patient on AVS  Hydrochlorothiazide 25 mg daily was on your med list last time, now not on your med list.  This is often prescribed for hypertension ( high blood pressure) also works as a water pill , you should check with your family physician if this is something you should be taking        Will see Debra Krishnan in: 4 months with LDCT   billing based on time: total time on encounter 40 min   Electronically signed by SEVERIANO Spears CNP on 3/16/2022 at 2:56 PM   Low Dose CT (LDCT) Lung Screening criteria met:     Age 50-77(Medicare) or 50-80 (USPSTF)   Pack year smoking >20   Still smoking or less than 15 year since quit   No sign or symptoms of lung cancer   > 11 months since last LDCT     Risks and benefits of lung cancer screening with LDCT scans discussed:    Significance of positive screen - False-positive LDCT results often occur. 95% of all positive results do not lead to a diagnosis of cancer. Usually further imaging can resolve most false-positive results; however, some patients may require invasive procedures.     Over diagnosis risk - 10% to 12% of screen-detected lung cancer cases are over diagnosed--that is, the cancer would not have been detected in the patient's lifetime without the screening. Need for follow up screens annually to continue lung cancer screening effectiveness     Risks associated with radiation from annual LDCT- Radiation exposure is about the same as for a mammogram, which is about 1/3 of the annual background radiation exposure from everyday life. Starting screening at age 54 is not likely to increase cancer risk from radiation exposure. Patients with comorbidities resulting in life expectancy of < 10 years, or that would preclude treatment of an abnormality identified on CT, should not be screened due to lack of benefit.     To obtain maximal benefit from this screening, smoking cessation and long-term abstinence from smoking is critical

## 2022-03-30 LAB
ALBUMIN SERPL-MCNC: 3.5 G/DL
ALP BLD-CCNC: 115 U/L
ALT SERPL-CCNC: 68 U/L
ANION GAP SERPL CALCULATED.3IONS-SCNC: 16 MMOL/L
AST SERPL-CCNC: 34 U/L
BILIRUB SERPL-MCNC: 0.3 MG/DL (ref 0.1–1.4)
BUN BLDV-MCNC: 13 MG/DL
CALCIUM SERPL-MCNC: 9.8 MG/DL
CHLORIDE BLD-SCNC: 101 MMOL/L
CO2: 29 MMOL/L
CREAT SERPL-MCNC: 1.1 MG/DL
EGFR: 53
GLUCOSE BLD-MCNC: 350 MG/DL
POTASSIUM SERPL-SCNC: 4.8 MMOL/L
SODIUM BLD-SCNC: 141 MMOL/L
TOTAL PROTEIN: 7.9

## 2022-06-06 DIAGNOSIS — J44.9 STAGE 4 VERY SEVERE COPD BY GOLD CLASSIFICATION (HCC): ICD-10-CM

## 2022-06-07 RX ORDER — ARFORMOTEROL TARTRATE 15 UG/2ML
SOLUTION RESPIRATORY (INHALATION)
Qty: 120 ML | Refills: 1 | Status: SHIPPED | OUTPATIENT
Start: 2022-06-07 | End: 2022-08-03 | Stop reason: SDUPTHER

## 2022-06-15 ENCOUNTER — TELEPHONE (OUTPATIENT)
Dept: PULMONOLOGY | Age: 67
End: 2022-06-15

## 2022-06-15 NOTE — TELEPHONE ENCOUNTER
Pt calling in stating she feels like she can hardly breathe and she is feeling tired all the time.  She states she feels like she has bad air in her lungs and has had a dry cough for a couple of weeks she is requesting that medication be sent to [address] [place] [address] in [address] [place] [address] St

## 2022-06-17 RX ORDER — PREDNISONE 50 MG/1
50 TABLET ORAL DAILY
Qty: 5 TABLET | Refills: 0 | Status: SHIPPED | OUTPATIENT
Start: 2022-06-17 | End: 2022-06-22

## 2022-07-26 DIAGNOSIS — F17.218 CIGARETTE NICOTINE DEPENDENCE WITH OTHER NICOTINE-INDUCED DISORDER: ICD-10-CM

## 2022-08-02 ENCOUNTER — HOSPITAL ENCOUNTER (OUTPATIENT)
Dept: GENERAL RADIOLOGY | Age: 67
Discharge: HOME | End: 2022-08-02

## 2022-08-02 ENCOUNTER — HOSPITAL ENCOUNTER (OUTPATIENT)
Dept: CT IMAGING | Age: 67
Discharge: HOME | End: 2022-08-02

## 2022-08-02 DIAGNOSIS — Z00.6 ENCOUNTER FOR EXAMINATION FOR NORMAL COMPARISON AND CONTROL IN CLINICAL RESEARCH PROGRAM: ICD-10-CM

## 2022-08-03 ENCOUNTER — OFFICE VISIT (OUTPATIENT)
Dept: PULMONOLOGY | Age: 67
End: 2022-08-03
Payer: COMMERCIAL

## 2022-08-03 VITALS
HEART RATE: 104 BPM | WEIGHT: 236 LBS | DIASTOLIC BLOOD PRESSURE: 74 MMHG | BODY MASS INDEX: 35.77 KG/M2 | OXYGEN SATURATION: 94 % | HEIGHT: 68 IN | SYSTOLIC BLOOD PRESSURE: 126 MMHG | TEMPERATURE: 98.2 F

## 2022-08-03 DIAGNOSIS — J44.9 STAGE 4 VERY SEVERE COPD BY GOLD CLASSIFICATION (HCC): ICD-10-CM

## 2022-08-03 DIAGNOSIS — E66.9 OBESITY (BMI 30-39.9): ICD-10-CM

## 2022-08-03 DIAGNOSIS — Z87.891 PERSONAL HISTORY OF TOBACCO USE: ICD-10-CM

## 2022-08-03 DIAGNOSIS — J96.11 CHRONIC RESPIRATORY FAILURE WITH HYPOXIA (HCC): ICD-10-CM

## 2022-08-03 DIAGNOSIS — I50.32 CHRONIC DIASTOLIC HEART FAILURE (HCC): ICD-10-CM

## 2022-08-03 DIAGNOSIS — Z79.899 POLYPHARMACY: ICD-10-CM

## 2022-08-03 DIAGNOSIS — R91.1 INCIDENTAL PULMONARY NODULE, > 3MM AND < 8MM: Primary | ICD-10-CM

## 2022-08-03 DIAGNOSIS — R53.81 PHYSICAL DECONDITIONING: ICD-10-CM

## 2022-08-03 PROCEDURE — 1123F ACP DISCUSS/DSCN MKR DOCD: CPT | Performed by: NURSE PRACTITIONER

## 2022-08-03 PROCEDURE — 99215 OFFICE O/P EST HI 40 MIN: CPT | Performed by: NURSE PRACTITIONER

## 2022-08-03 RX ORDER — GUAIFENESIN, PSEUDOEPHEDRINE HYDROCHLORIDE 600; 60 MG/1; MG/1
1 TABLET, EXTENDED RELEASE ORAL EVERY 12 HOURS PRN
Qty: 14 TABLET | Refills: 0 | Status: SHIPPED | OUTPATIENT
Start: 2022-08-03 | End: 2022-08-10

## 2022-08-03 RX ORDER — IPRATROPIUM BROMIDE AND ALBUTEROL SULFATE 2.5; .5 MG/3ML; MG/3ML
1 SOLUTION RESPIRATORY (INHALATION) EVERY 4 HOURS PRN
Qty: 540 ML | Refills: 11 | Status: SHIPPED | OUTPATIENT
Start: 2022-08-03

## 2022-08-03 RX ORDER — AZITHROMYCIN 500 MG/1
500 TABLET, FILM COATED ORAL DAILY
Qty: 1 PACKET | Refills: 0 | Status: SHIPPED | OUTPATIENT
Start: 2022-08-03 | End: 2022-09-30 | Stop reason: SDUPTHER

## 2022-08-03 RX ORDER — ARFORMOTEROL TARTRATE 15 UG/2ML
SOLUTION RESPIRATORY (INHALATION)
Qty: 120 ML | Refills: 11 | Status: SHIPPED | OUTPATIENT
Start: 2022-08-03 | End: 2022-09-30 | Stop reason: SDUPTHER

## 2022-08-03 RX ORDER — PREDNISONE 20 MG/1
40 TABLET ORAL DAILY
Qty: 10 TABLET | Refills: 0 | Status: SHIPPED | OUTPATIENT
Start: 2022-08-03 | End: 2022-09-30 | Stop reason: SDUPTHER

## 2022-08-03 ASSESSMENT — ENCOUNTER SYMPTOMS
WHEEZING: 1
VOMITING: 0
SHORTNESS OF BREATH: 1
NAUSEA: 0
ABDOMINAL PAIN: 0
DIARRHEA: 0
COUGH: 1
CHEST TIGHTNESS: 1
EYES NEGATIVE: 1

## 2022-08-03 NOTE — PROGRESS NOTES
[facility]     Patient: Rodolfo Cardona, 78 y.o.   : 1955  8/3/2022    Pt of Dr. Janine Bernal   Patient presents with    Follow-up     4 month COPD follow up with CT 22        HPI  Graham King is here for 4 months follow up for COPD  Stage IV very severe COPD FEV1 24%. Last spirometry   Accompanied by her  who stayed out in the lobby  PSG - no DENISHA, but + nocturnal hypoxia  147.6 min < 88%  A1A- MM (normal)   On BiPAP  at night for chronic respiratory failure , nocturnal hypoxia on PSG   Current Oxygen order: 4LPM ATC including into PAP   Last 6 min walk:2020  is  using O2 compliantly  Quit smoking > 1 year ago   Trying to loose weight, is down 6 lbs. Drinking slim fast 2 times per day . Patient is discouraged that she is not losing weight faster. She is hoping to lose weight to be able to get to [facility] to discuss lung volume reduction surgery   She spoke to intake at [facility] and was told she has to get below 200 lbs before they will see her for any lung surgery/ transplant      Has completed pulmonary rehab in the past.  Brandon Madrigal to use her oscillating flutter device and incentive spirometer as needed at home    Current symptoms include intermittent cough, intermittent chest tightness, occasional wheezing, chronic dyspnea. Symptoms are currently stable. Has not required antibiotics or oral steroids lately. She is using her duo nebs about 4 times a day. Taking her Brovana nebulizers twice daily. Taking Daliresp 500 mcg p.o. daily. Taking Mucinex as needed. There have been no hospitalizations or ER visits    UTD with flu vaccine No- does plan to get this fall   UTD with pneumonia vaccine Yes pneumovax 23 3/2022  UTD COVID vaccine yes, - had large rash and swelling to arm from 2nd covid vaccine . Hesitant to get boosted.        SOCIAL HISTORY:  Social History     Tobacco Use    Smoking status: Former     Packs/day: 1.00     Years: 42.00     Pack years: 42.00     Types: Cigarettes     Start date: 3/22/1978     Quit date: 2021     Years since quittin.1    Smokeless tobacco: Never    Tobacco comments:     1 pack every 2-3 weeks   Substance Use Topics    Alcohol use: No    Drug use: No         CURRENT MEDICATIONS:  Current Outpatient Medications   Medication Sig Dispense Refill    Liraglutide (VICTOZA) 18 MG/3ML SOPN SC injection Inject 1.2 mg into the skin in the morning.       Arformoterol Tartrate (BROVANA) 15 MCG/2ML NEBU USE 1 VIAL IN NEBULIZER TWICE DAILY 120 mL 1    cetirizine (ZYRTEC) 10 MG tablet Take 10 mg by mouth daily      Roflumilast (DALIRESP) 250 MCG tablet Take 1 tablet by mouth daily 90 tablet 3    POTASSIUM CHLORIDE PO Take by mouth      promethazine (PHENERGAN) 12.5 MG tablet Take 12.5 mg by mouth every 6 hours as needed for Nausea      guaiFENesin (MUCINEX) 600 MG extended release tablet Take 1,200 mg by mouth 2 times daily      alogliptin (NESINA) 12.5 MG TABS tablet Take 1 tablet by mouth daily 60 tablet 1    lisinopril (PRINIVIL;ZESTRIL) 20 MG tablet Take 1 tablet by mouth daily 30 tablet 3    hydroCHLOROthiazide (HYDRODIURIL) 25 MG tablet Take 1 tablet by mouth daily 30 tablet 3    ipratropium-albuterol (DUONEB) 0.5-2.5 (3) MG/3ML SOLN nebulizer solution Inhale 3 mLs into the lungs every 4 hours as needed for Shortness of Breath 540 mL 11    albuterol sulfate  (90 Base) MCG/ACT inhaler Inhale 2 puffs into the lungs every 4 hours as needed for Wheezing or Shortness of Breath 1 Inhaler 11    levocetirizine (XYZAL) 5 MG tablet Take 1 tablet by mouth nightly 30 tablet 11    fluticasone (FLONASE) 50 MCG/ACT nasal spray 2 sprays by Nasal route daily 1 Bottle 11    aspirin 81 MG tablet Take 81 mg by mouth daily      metoprolol tartrate (LOPRESSOR) 25 MG tablet Take 50 mg by mouth 2 times daily       metFORMIN (GLUCOPHAGE) 500 MG tablet Take 500 mg by mouth 2 times daily (with meals) 1,000 mg at supper. Cholecalciferol (VITAMIN D) 2000 units CAPS capsule Take by mouth      Cyanocobalamin (B-12 PO) Take by mouth      Multiple Vitamins-Minerals (THERAPEUTIC MULTIVITAMIN-MINERALS) tablet Take 1 tablet by mouth daily      pravastatin (PRAVACHOL) 10 MG tablet Take 10 mg by mouth daily      Pseudoephedrine-Guaifenesin (MUCINEX D PO) Take by mouth as needed       Ibuprofen (ADVIL PO) Take by mouth as needed      FUROSEMIDE PO Take 80 mg by mouth        No current facility-administered medications for this visit. Harrison KENNY   Review of Systems   Constitutional:  Positive for fatigue. Negative for activity change, appetite change, chills, fever and unexpected weight change. HENT: Negative. Eyes: Negative. Respiratory:  Positive for cough, chest tightness, shortness of breath and wheezing. Cardiovascular:  Positive for chest pain (intermittent). Negative for palpitations and leg swelling. Gastrointestinal:  Negative for abdominal pain, diarrhea, nausea and vomiting. Genitourinary: Negative. Musculoskeletal: Negative. Skin: Negative. Neurological: Negative. Hematological: Negative. Psychiatric/Behavioral: Negative. Negative for sleep disturbance. Physical exam   /74   Pulse (!) 104   Temp 98.2 °F (36.8 °C)   Ht 5' 7.5\" (1.715 m)   Wt 236 lb (107 kg)   SpO2 94%   BMI 36.42 kg/m²       Wt Readings from Last 3 Encounters:   08/03/22 236 lb (107 kg)   03/16/22 243 lb 12.8 oz (110.6 kg)   12/22/21 239 lb 6.4 oz (108.6 kg)     Physical Exam  Vitals and nursing note reviewed. Constitutional:       General: She is not in acute distress. Appearance: She is well-developed. She is obese. Interventions: Nasal cannula in place. Comments: Presents in wheelchair    HENT:      Mouth/Throat:      Lips: Pink. Mouth: Mucous membranes are moist.      Pharynx: Oropharynx is clear.  No oropharyngeal exudate or posterior oropharyngeal erythema. Eyes:      Conjunctiva/sclera: Conjunctivae normal.   Neck:      Vascular: No JVD. Cardiovascular:      Rate and Rhythm: Normal rate and regular rhythm. Heart sounds: No murmur heard. No friction rub. Pulmonary:      Effort: Pulmonary effort is normal. No accessory muscle usage or respiratory distress. Breath sounds: Normal breath sounds. Decreased air movement present. No wheezing, rhonchi or rales. Chest:      Chest wall: No tenderness. Musculoskeletal:      Right lower leg: No edema. Left lower leg: No edema. Skin:     General: Skin is warm and dry. Capillary Refill: Capillary refill takes less than 2 seconds. Nails: There is no clubbing. Neurological:      Mental Status: She is alert and oriented to person, place, and time. Psychiatric:         Mood and Affect: Mood normal.         Behavior: Behavior normal.         Thought Content: Thought content normal.         Judgment: Judgment normal.        Test results   Lung Nodule Screening     [x] Qualifies    []Does not qualify   [] Declined    [x] Completed               Assessment       ICD-10-CM    1. Incidental pulmonary nodule, > 3mm and < 8mm  R91.1       2. Stage 4 very severe COPD by GOLD classification ([address]  J44.9       3. Personal history of tobacco use  Z87.891       4. Chronic respiratory failure with hypoxia (HCC)  J96.11       5. Obesity (BMI 30-39. 9)  E66.9       6. Polypharmacy  Z79.899       7. Chronic diastolic heart failure (HCC)  I50.32       8. Physical deconditioning  R53.81           Plan    -CT results reviewed. We will plan to repeat CT of chest without contrast in 6 months to follow-up pulmonary nodules  -COPD symptoms currently optimally controlled. We will continue Otilia Forde. Refills were E scribed to the pharmacy today  -Educated today on severity of her lung disease.   It is pertinent that she continue to stay away from ill contacts.,  Keep up-to-date with all of

## 2022-08-03 NOTE — PROGRESS NOTES
Patient is experiencing SOB: yes, states is getting worse     Patient is experiencing wheezing: Yes    Patient states they have had a Weak, productive = 2 cough. Phlegm is daily, color:  Jorge Naranjoalam? Patient is coughing up blood: no    Patient has been experiencing chest pains: chest tightness, states chest pain comes and goes but can be very painful     Patient is currently taking the following inhaler(s): Albuterol    Patient is currently taking the following nebulizer treatment(s): Emeli Villatoro    Patient is using their rescue inhaler 1-2 times per day. Patient is currently using 4 liters of oxygen at all times.

## 2022-08-08 DIAGNOSIS — Z79.52 LONG TERM SYSTEMIC STEROID USER: ICD-10-CM

## 2022-08-08 DIAGNOSIS — J44.9 STAGE 4 VERY SEVERE COPD BY GOLD CLASSIFICATION (HCC): ICD-10-CM

## 2022-08-15 RX ORDER — PREDNISONE 10 MG/1
TABLET ORAL
Qty: 90 TABLET | Refills: 0 | Status: SHIPPED | OUTPATIENT
Start: 2022-08-15

## 2022-08-17 DIAGNOSIS — J44.9 STAGE 4 VERY SEVERE COPD BY GOLD CLASSIFICATION (HCC): ICD-10-CM

## 2022-08-17 DIAGNOSIS — Z79.52 LONG TERM SYSTEMIC STEROID USER: ICD-10-CM

## 2022-08-17 RX ORDER — PREDNISONE 10 MG/1
TABLET ORAL
Qty: 90 TABLET | Refills: 0 | OUTPATIENT
Start: 2022-08-17

## 2022-08-17 NOTE — TELEPHONE ENCOUNTER
Lety Davis called requesting a refill on the following medications:  Requested Prescriptions     Pending Prescriptions Disp Refills    predniSONE (DELTASONE) 10 MG tablet 90 tablet 0     Pharmacy verified:[facility] [place],[place]  .pv      Date of last visit: 8/3/2022  Date of next visit (if applicable): 8-

## 2022-09-28 DIAGNOSIS — J44.9 STAGE 4 VERY SEVERE COPD BY GOLD CLASSIFICATION (HCC): ICD-10-CM

## 2022-09-28 NOTE — TELEPHONE ENCOUNTER
Roman Lara called requesting a refill on the following medications:  Requested Prescriptions     Pending Prescriptions Disp Refills    Arformoterol Tartrate (BROVANA) 15 MCG/2ML NEBU 120 mL 11     Sig: USE 1 VIAL IN NEBULIZER TWICE DAILY    predniSONE (DELTASONE) 20 MG tablet 10 tablet 0     Sig: Take 2 tablets by mouth daily for 5 days Take at onset of COPD exacerbation    azithromycin (ZITHROMAX) 500 MG tablet 1 packet 0     Sig: Take 1 tablet by mouth daily for 3 days Take at onset of COPD exacerbation     Pharmacy [facility]  . pv      Date of last visit: 8-3-2022  Date of next visit (if applicable):2-

## 2022-09-29 NOTE — TELEPHONE ENCOUNTER
Aida Saavedraro last ordered 06/07/2022  Prednisone last ordered 08/15/2022  Zithromax last ordered 08/03/2022  Last visit 08/03/2022  Next visit 02/15/2023

## 2022-09-30 RX ORDER — AZITHROMYCIN 500 MG/1
500 TABLET, FILM COATED ORAL DAILY
Qty: 1 PACKET | Refills: 0 | Status: SHIPPED | OUTPATIENT
Start: 2022-09-30 | End: 2022-10-03

## 2022-09-30 RX ORDER — ARFORMOTEROL TARTRATE 15 UG/2ML
SOLUTION RESPIRATORY (INHALATION)
Qty: 120 ML | Refills: 11 | Status: SHIPPED | OUTPATIENT
Start: 2022-09-30

## 2022-09-30 RX ORDER — PREDNISONE 20 MG/1
40 TABLET ORAL DAILY
Qty: 10 TABLET | Refills: 0 | Status: SHIPPED | OUTPATIENT
Start: 2022-09-30 | End: 2022-10-05

## 2022-11-30 ENCOUNTER — TELEPHONE (OUTPATIENT)
Dept: PULMONOLOGY | Age: 67
End: 2022-11-30

## 2022-11-30 DIAGNOSIS — J44.1 COPD EXACERBATION (HCC): Primary | ICD-10-CM

## 2022-11-30 RX ORDER — PREDNISONE 20 MG/1
40 TABLET ORAL DAILY
Qty: 10 TABLET | Refills: 0 | Status: SHIPPED | OUTPATIENT
Start: 2022-11-30 | End: 2022-12-05

## 2022-11-30 RX ORDER — AZITHROMYCIN 500 MG/1
500 TABLET, FILM COATED ORAL DAILY
Qty: 1 PACKET | Refills: 0 | Status: SHIPPED | OUTPATIENT
Start: 2022-11-30 | End: 2022-12-03

## 2022-11-30 NOTE — TELEPHONE ENCOUNTER
Patient called into office requesting Prednisone and Azithromycin. Patient is experiencing  dry cough, Cough is new/worsening yes, getting worse, Wheezing Yes, absent, SOB with exertion. Symptoms started 3 weeks ago. Received refill request for Prednisone 20mg. Medication was last ordered by Sara. Medication was last ordered on 8/3/22 with 0 refills. Received refill request for Azithromycin 500mg. Medication was last ordered by Sara. Medication was last ordered on 8/3/22 with 0 refills. Patient was last seen in the office 8/3/22. Does patient have a scheduled follow up?: yes - 2/15/23    Medication needs to be sent to [facility] in [facility]. Thank you, please advise! Patient's Allergies:   Allergies   Allergen Reactions    Excedrin Extra Strength [Aspirin-Acetaminophen-Caffeine] Other (See Comments)    Norco [Hydrocodone-Acetaminophen]     Tylenol [Acetaminophen] Nausea And Vomiting

## 2023-01-27 ENCOUNTER — TELEPHONE (OUTPATIENT)
Dept: PULMONOLOGY | Age: 68
End: 2023-01-27

## 2023-01-27 DIAGNOSIS — J44.1 COPD EXACERBATION (HCC): Primary | ICD-10-CM

## 2023-01-27 NOTE — TELEPHONE ENCOUNTER
Patient called office requesting refills of prednisone and azithromycin. Patient states two weeks ago she started with difficulty breathing, runny and then stuffy nose, some chest tightness, sneezing, cough (sometimes productive, bringing up cream colored phlegm) patient states it feels like she can not cough it out, no fevers. Patient using Brovana and Duoneb nebulizer and Albuterol inhaler. Received refill request for Prednisone 20mg. Medication was last ordered by Gala Sloan. Medication was last ordered on 11/30/22 with 0 refills. Received refill request for Azithromycin 500mg. Medication was last ordered by Gala Sloan. Medication was last ordered on 11/30/22 with 0 refills. Received refill request for daily Prednisone 10mg. Medication was last ordered by Gala Sloan. Medication was last ordered on 8/15/22 with 0 refills. Patient was last seen in the office 8/3/22. Patient has a scheduled follow up 2/15/23. Medication needs to be sent to [address] [place] [address]   Phone:  [phone]  Fax:  [phone] Pharmacy. Please advise.

## 2023-01-30 RX ORDER — AZITHROMYCIN 500 MG/1
500 TABLET, FILM COATED ORAL DAILY
Qty: 3 TABLET | Refills: 0 | Status: SHIPPED | OUTPATIENT
Start: 2023-01-30 | End: 2023-02-02

## 2023-01-30 RX ORDER — PREDNISONE 20 MG/1
40 TABLET ORAL DAILY
Qty: 10 TABLET | Refills: 0 | Status: SHIPPED | OUTPATIENT
Start: 2023-01-30 | End: 2023-02-04

## 2023-01-30 NOTE — TELEPHONE ENCOUNTER
Phoned patient to inform zithromax and prednisone were both sent to [facility] in [facility], patient states no further questions or concerns at this time.

## 2023-02-14 NOTE — PROGRESS NOTES
[facility]     Patient: Hakan Clemons, 80 y.o.   : 1955  2/15/2023    Pt of Dr. Rebecca Ceballos   Patient presents with    COPD     6mo COPD f/u w/CT Chest f/u. CT completed 2/10/23 at [facility]. Here to discuss results. TORO Evans is here for 6 months follow up for COPD / pulmonary nodule with Ct chest     Stage IV COPD , FEV 1 24%   A1A - MM   On 4LPM O2 ATC including bleed into PAP   Trying to loose weigh to be candidate for lung transplant , her  and son are trying to help her , she is frustrated that she has not been loosing weight. \" I am hungry all the time\"   She is using her duo nebs about 4 times a day. Taking her Brovana nebulizers twice daily. Taking Daliresp 500 mcg p.o. daily. Taking Mucinex as needed. 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 Did require atb/ steroids for COPD exac  in 2022, 2022, 2023. Current symptoms: stable chronic TORRES, cough , occ.  Wheezing     SOCIAL HISTORY:  Social History     Tobacco Use    Smoking status: Former     Packs/day: 1.00     Years: 42.00     Pack years: 42.00     Types: Cigarettes     Start date: 3/22/1978     Quit date: 2021     Years since quittin.6    Smokeless tobacco: Never    Tobacco comments:     Quit smoking    Vaping Use    Vaping Use: Never used   Substance Use Topics    Alcohol use: No    Drug use: No       CURRENT MEDICATIONS:  Current Outpatient Medications   Medication Sig Dispense Refill    predniSONE (DELTASONE) 20 MG tablet Take 2 tablets by mouth daily for 5 days Take at onset of COPD exacerbation 10 tablet 0    azithromycin (ZITHROMAX) 500 MG tablet Take 1 tablet by mouth daily for 3 days 1 packet 0    predniSONE (DELTASONE) 10 MG tablet Take 1 tablet by mouth once daily 90 tablet 0    Arformoterol Tartrate (BROVANA) 15 MCG/2ML NEBU USE 1 VIAL IN NEBULIZER TWICE DAILY 120 mL 11    Liraglutide (VICTOZA) 18 MG/3ML SOPN SC injection Inject 1.2 mg into the skin in the morning. Roflumilast (DALIRESP) 250 MCG tablet Take 1 tablet by mouth in the morning. 90 tablet 3    ipratropium-albuterol (DUONEB) 0.5-2.5 (3) MG/3ML SOLN nebulizer solution Inhale 3 mLs into the lungs every 4 hours as needed for Shortness of Breath 540 mL 11    cetirizine (ZYRTEC) 10 MG tablet Take 10 mg by mouth daily      POTASSIUM CHLORIDE PO Take by mouth      promethazine (PHENERGAN) 12.5 MG tablet Take 12.5 mg by mouth every 6 hours as needed for Nausea      guaiFENesin (MUCINEX) 600 MG extended release tablet Take 1,200 mg by mouth 2 times daily      alogliptin (NESINA) 12.5 MG TABS tablet Take 1 tablet by mouth daily 60 tablet 1    lisinopril (PRINIVIL;ZESTRIL) 20 MG tablet Take 1 tablet by mouth daily 30 tablet 3    hydroCHLOROthiazide (HYDRODIURIL) 25 MG tablet Take 1 tablet by mouth daily 30 tablet 3    albuterol sulfate  (90 Base) MCG/ACT inhaler Inhale 2 puffs into the lungs every 4 hours as needed for Wheezing or Shortness of Breath 1 Inhaler 11    levocetirizine (XYZAL) 5 MG tablet Take 1 tablet by mouth nightly 30 tablet 11    aspirin 81 MG tablet Take 81 mg by mouth daily      metoprolol tartrate (LOPRESSOR) 25 MG tablet Take 50 mg by mouth 2 times daily       metFORMIN (GLUCOPHAGE) 500 MG tablet Take 500 mg by mouth 2 times daily (with meals) 1,000 mg at supper.       Cholecalciferol (VITAMIN D) 2000 units CAPS capsule Take by mouth      Cyanocobalamin (B-12 PO) Take by mouth      Multiple Vitamins-Minerals (THERAPEUTIC MULTIVITAMIN-MINERALS) tablet Take 1 tablet by mouth daily      pravastatin (PRAVACHOL) 10 MG tablet Take 10 mg by mouth daily      Ibuprofen (ADVIL PO) Take by mouth as needed      FUROSEMIDE PO Take 80 mg by mouth       fluticasone (FLONASE) 50 MCG/ACT nasal spray 2 sprays by Nasal route daily 1 Bottle 11     No current facility-administered medications for this visit. Joe KENNY   Review of Systems   Constitutional:  Positive for fatigue. Negative for activity change, appetite change, chills, fever and unexpected weight change. HENT: Negative. Eyes: Negative. Respiratory:  Positive for cough and shortness of breath. Negative for wheezing. Cardiovascular:  Negative for chest pain, palpitations and leg swelling. Gastrointestinal:  Negative for abdominal pain, diarrhea, nausea and vomiting. Genitourinary: Negative. Musculoskeletal: Negative. Skin: Negative. Neurological: Negative. Hematological: Negative. Psychiatric/Behavioral: Negative. Physical exam   /60 (Site: Left Upper Arm, Position: Sitting, Cuff Size: Large Adult)   Pulse (!) 101   Temp 98 °F (36.7 °C) (Oral)   Ht 5' 7.5\" (1.715 m)   Wt 237 lb (107.5 kg)   SpO2 92%   BMI 36.57 kg/m²       Wt Readings from Last 3 Encounters:   02/15/23 237 lb (107.5 kg)   08/03/22 236 lb (107 kg)   03/16/22 243 lb 12.8 oz (110.6 kg)     Physical Exam  Vitals and nursing note reviewed. Constitutional:       General: She is not in acute distress. Appearance: She is well-developed and overweight. HENT:      Mouth/Throat:      Lips: Pink. Mouth: Mucous membranes are moist.      Pharynx: Oropharynx is clear. No oropharyngeal exudate or posterior oropharyngeal erythema. Eyes:      Conjunctiva/sclera: Conjunctivae normal.   Neck:      Vascular: No JVD. Cardiovascular:      Rate and Rhythm: Normal rate and regular rhythm. Heart sounds: No murmur heard. No friction rub.    Pulmonary:      Effort: Pulmonary effort is normal. No accessory muscle usage or respiratory distress. Breath sounds: Normal breath sounds. Decreased air movement present. No wheezing, rhonchi or rales. Chest:      Chest wall: No tenderness. Musculoskeletal:      Right lower leg: No edema. Left lower leg: No edema. Skin:     General: Skin is warm and dry. Capillary Refill: Capillary refill takes less than 2 seconds. Nails: There is no clubbing. Neurological:      Mental Status: She is alert and oriented to person, place, and time. Psychiatric:         Mood and Affect: Mood normal.         Behavior: Behavior normal.         Thought Content: Thought content normal.         Judgment: Judgment normal.        Test results   Lung Nodule Screening     [x] Qualifies    []Does not qualify   [] Declined    [x] Completed           Assessment       ICD-10-CM    1. Stage 4 very severe COPD by GOLD classification ([facility])  J44.9 predniSONE (DELTASONE) 20 MG tablet     azithromycin (ZITHROMAX) 500 MG tablet     predniSONE (DELTASONE) 10 MG tablet      2. Long term systemic steroid user  Z79.52 predniSONE (DELTASONE) 10 MG tablet      3. Obesity (BMI 30-39. 9)  E66.9       4. Personal history of tobacco use  Z87.891       5. Chronic respiratory failure with hypoxia ([facility])  J96.11         Stable pulmonary nodules, largest measuring 6 mm RUL, stable dating back to 2021.   Evidence for old healed granulomatous infection    Plan    -stable CT findings , go back to annual LDCT lung screening - will order at follow up   -COPD zone's discussed with patient, was given handout with office phone number- take at onset of copd exac     -prednisone 40 mg PO x 5 day script printed and given with Zone paper,   -zithromax 500 mg PO x 3 day script printed and given with Zone Paper   Pt to call office if no improvement in symptoms     -continue current inhalers/ daily prednisone 10 mg PO daily, Daliresp   -continue oxygen ATC   -avoid ill contacts  -keep UTD on recommended vaccinations    (Please note that portions of this note may have been with a voice recognition program.  Efforts were made to edit the dictation but occasionally words are mis-transcribed )     Will see Taylor Gonzalez in: 6 months  billing based on time: total time on encounter 30 min   Electronically signed by SEVERIANO Lopez CNP on 2/15/2023 at 3:09 PM

## 2023-02-15 ENCOUNTER — OFFICE VISIT (OUTPATIENT)
Dept: PULMONOLOGY | Age: 68
End: 2023-02-15
Payer: COMMERCIAL

## 2023-02-15 VITALS
BODY MASS INDEX: 35.92 KG/M2 | SYSTOLIC BLOOD PRESSURE: 118 MMHG | HEART RATE: 101 BPM | HEIGHT: 68 IN | TEMPERATURE: 98 F | OXYGEN SATURATION: 92 % | DIASTOLIC BLOOD PRESSURE: 60 MMHG | WEIGHT: 237 LBS

## 2023-02-15 DIAGNOSIS — E66.9 OBESITY (BMI 30-39.9): ICD-10-CM

## 2023-02-15 DIAGNOSIS — Z87.891 PERSONAL HISTORY OF TOBACCO USE: ICD-10-CM

## 2023-02-15 DIAGNOSIS — Z79.52 LONG TERM SYSTEMIC STEROID USER: ICD-10-CM

## 2023-02-15 DIAGNOSIS — J44.9 STAGE 4 VERY SEVERE COPD BY GOLD CLASSIFICATION (HCC): Primary | ICD-10-CM

## 2023-02-15 DIAGNOSIS — J96.11 CHRONIC RESPIRATORY FAILURE WITH HYPOXIA (HCC): ICD-10-CM

## 2023-02-15 PROCEDURE — 99214 OFFICE O/P EST MOD 30 MIN: CPT | Performed by: NURSE PRACTITIONER

## 2023-02-15 PROCEDURE — 1123F ACP DISCUSS/DSCN MKR DOCD: CPT | Performed by: NURSE PRACTITIONER

## 2023-02-15 PROCEDURE — 3078F DIAST BP <80 MM HG: CPT | Performed by: NURSE PRACTITIONER

## 2023-02-15 PROCEDURE — 3074F SYST BP LT 130 MM HG: CPT | Performed by: NURSE PRACTITIONER

## 2023-02-15 RX ORDER — AZITHROMYCIN 500 MG/1
500 TABLET, FILM COATED ORAL DAILY
Qty: 1 PACKET | Refills: 0 | Status: SHIPPED | OUTPATIENT
Start: 2023-02-15 | End: 2023-02-18

## 2023-02-15 RX ORDER — PREDNISONE 20 MG/1
40 TABLET ORAL DAILY
Qty: 10 TABLET | Refills: 0 | Status: SHIPPED | OUTPATIENT
Start: 2023-02-15 | End: 2023-02-20

## 2023-02-15 RX ORDER — PREDNISONE 10 MG/1
TABLET ORAL
Qty: 90 TABLET | Refills: 0 | Status: SHIPPED | OUTPATIENT
Start: 2023-02-15

## 2023-02-15 ASSESSMENT — ENCOUNTER SYMPTOMS
VOMITING: 0
EYES NEGATIVE: 1
COUGH: 1
DIARRHEA: 0
SHORTNESS OF BREATH: 1
WHEEZING: 0
NAUSEA: 0
ABDOMINAL PAIN: 0

## 2023-02-23 ENCOUNTER — TELEPHONE (OUTPATIENT)
Dept: PULMONOLOGY | Age: 68
End: 2023-02-23

## 2023-02-23 DIAGNOSIS — J44.9 STAGE 4 VERY SEVERE COPD BY GOLD CLASSIFICATION (HCC): ICD-10-CM

## 2023-02-23 RX ORDER — ROFLUMILAST 250 UG/1
TABLET ORAL
Qty: 90 TABLET | Refills: 3 | Status: SHIPPED | OUTPATIENT
Start: 2023-02-23

## 2023-02-23 NOTE — TELEPHONE ENCOUNTER
Pt called and needs refill on Daliresp. Script should of lasted until Aug but Melva is saying she needs a new one. It's a 90 day script also please. Last seen 2/25/23.

## 2023-04-05 ENCOUNTER — TELEPHONE (OUTPATIENT)
Dept: PULMONOLOGY | Age: 68
End: 2023-04-05

## 2023-04-05 DIAGNOSIS — J44.9 STAGE 4 VERY SEVERE COPD BY GOLD CLASSIFICATION (HCC): ICD-10-CM

## 2023-04-05 NOTE — TELEPHONE ENCOUNTER
Patient called to get daliresp refilled. I let her know that it shows refills were sent to the pharmacy in February. She said that they pharmacy told her she needed a new script. I contact the pharmacy to clarify. The pharmacist said it is not approving because it says SHAISTA. He said it will go through if the SHAISTA is removed. The patient stated she was okay with the generic. Is there a reason for her not to get the generic?  Please advise      [facility] DR TERRI CORRALES [address] [place] [address], [address] [place] [address] [phone] - F [phone]

## 2023-04-06 RX ORDER — ROFLUMILAST 250 UG/1
250 TABLET ORAL DAILY
Qty: 30 TABLET | Refills: 6 | Status: SHIPPED | OUTPATIENT
Start: 2023-04-06

## 2023-06-14 ENCOUNTER — TELEPHONE (OUTPATIENT)
Dept: PULMONOLOGY | Age: 68
End: 2023-06-14

## 2023-06-14 DIAGNOSIS — J44.9 STAGE 4 VERY SEVERE COPD BY GOLD CLASSIFICATION (HCC): ICD-10-CM

## 2023-06-14 DIAGNOSIS — J44.1 COPD EXACERBATION (HCC): Primary | ICD-10-CM

## 2023-06-14 RX ORDER — AZITHROMYCIN 500 MG/1
500 TABLET, FILM COATED ORAL DAILY
Qty: 1 PACKET | Refills: 0 | Status: SHIPPED | OUTPATIENT
Start: 2023-06-14 | End: 2023-06-17

## 2023-06-15 ENCOUNTER — TELEPHONE (OUTPATIENT)
Dept: PULMONOLOGY | Age: 68
End: 2023-06-15

## 2023-06-15 RX ORDER — PREDNISONE 20 MG/1
40 TABLET ORAL DAILY
Qty: 10 TABLET | Refills: 0 | Status: SHIPPED | OUTPATIENT
Start: 2023-06-15 | End: 2023-06-20

## 2023-07-12 DIAGNOSIS — J44.9 STAGE 4 VERY SEVERE COPD BY GOLD CLASSIFICATION (HCC): ICD-10-CM

## 2023-07-12 RX ORDER — IPRATROPIUM BROMIDE AND ALBUTEROL SULFATE 2.5; .5 MG/3ML; MG/3ML
1 SOLUTION RESPIRATORY (INHALATION) EVERY 4 HOURS PRN
Qty: 1080 ML | Refills: 0 | Status: SHIPPED | OUTPATIENT
Start: 2023-07-12

## 2023-07-12 NOTE — TELEPHONE ENCOUNTER
90 day supply approved, no refills due to need to review med list with patient at follow up in August due to polypharmacy and duplicate meds

## 2023-08-16 ENCOUNTER — OFFICE VISIT (OUTPATIENT)
Dept: PULMONOLOGY | Age: 68
End: 2023-08-16
Payer: COMMERCIAL

## 2023-08-16 VITALS
HEART RATE: 101 BPM | BODY MASS INDEX: 37.93 KG/M2 | DIASTOLIC BLOOD PRESSURE: 64 MMHG | WEIGHT: 236 LBS | OXYGEN SATURATION: 91 % | TEMPERATURE: 97.9 F | SYSTOLIC BLOOD PRESSURE: 118 MMHG | HEIGHT: 66 IN

## 2023-08-16 DIAGNOSIS — R07.9 CHEST PAIN, UNSPECIFIED TYPE: Primary | ICD-10-CM

## 2023-08-16 DIAGNOSIS — E66.9 OBESITY (BMI 30-39.9): ICD-10-CM

## 2023-08-16 DIAGNOSIS — Z79.52 LONG TERM SYSTEMIC STEROID USER: ICD-10-CM

## 2023-08-16 DIAGNOSIS — J96.12 CHRONIC RESPIRATORY FAILURE WITH HYPOXIA AND HYPERCAPNIA (HCC): ICD-10-CM

## 2023-08-16 DIAGNOSIS — R53.83 FATIGUE, UNSPECIFIED TYPE: ICD-10-CM

## 2023-08-16 DIAGNOSIS — Z87.891 PERSONAL HISTORY OF TOBACCO USE: ICD-10-CM

## 2023-08-16 DIAGNOSIS — J96.11 CHRONIC RESPIRATORY FAILURE WITH HYPOXIA AND HYPERCAPNIA (HCC): ICD-10-CM

## 2023-08-16 DIAGNOSIS — J44.9 STAGE 4 VERY SEVERE COPD BY GOLD CLASSIFICATION (HCC): ICD-10-CM

## 2023-08-16 PROCEDURE — 1123F ACP DISCUSS/DSCN MKR DOCD: CPT | Performed by: NURSE PRACTITIONER

## 2023-08-16 PROCEDURE — 3078F DIAST BP <80 MM HG: CPT | Performed by: NURSE PRACTITIONER

## 2023-08-16 PROCEDURE — 3074F SYST BP LT 130 MM HG: CPT | Performed by: NURSE PRACTITIONER

## 2023-08-16 PROCEDURE — G0296 VISIT TO DETERM LDCT ELIG: HCPCS | Performed by: NURSE PRACTITIONER

## 2023-08-16 PROCEDURE — 99214 OFFICE O/P EST MOD 30 MIN: CPT | Performed by: NURSE PRACTITIONER

## 2023-08-16 RX ORDER — PREDNISONE 20 MG/1
40 TABLET ORAL DAILY
Qty: 10 TABLET | Refills: 0 | Status: SHIPPED | OUTPATIENT
Start: 2023-08-16 | End: 2023-08-21

## 2023-08-16 RX ORDER — IPRATROPIUM BROMIDE AND ALBUTEROL SULFATE 2.5; .5 MG/3ML; MG/3ML
1 SOLUTION RESPIRATORY (INHALATION) EVERY 4 HOURS PRN
Qty: 1080 ML | Refills: 3 | Status: SHIPPED | OUTPATIENT
Start: 2023-08-16

## 2023-08-16 RX ORDER — PREDNISONE 10 MG/1
TABLET ORAL
Qty: 90 TABLET | Refills: 3 | Status: SHIPPED | OUTPATIENT
Start: 2023-08-16

## 2023-08-16 RX ORDER — ALBUTEROL SULFATE 90 UG/1
2 AEROSOL, METERED RESPIRATORY (INHALATION) EVERY 4 HOURS PRN
Qty: 1 EACH | Refills: 11 | Status: SHIPPED | OUTPATIENT
Start: 2023-08-16

## 2023-08-16 RX ORDER — ARFORMOTEROL TARTRATE 15 UG/2ML
SOLUTION RESPIRATORY (INHALATION)
Qty: 120 ML | Refills: 11 | Status: SHIPPED | OUTPATIENT
Start: 2023-08-16

## 2023-08-16 RX ORDER — AZITHROMYCIN 500 MG/1
500 TABLET, FILM COATED ORAL DAILY
Qty: 1 PACKET | Refills: 0 | Status: SHIPPED | OUTPATIENT
Start: 2023-08-16 | End: 2023-08-19

## 2023-08-16 ASSESSMENT — ENCOUNTER SYMPTOMS
ABDOMINAL PAIN: 0
NAUSEA: 0
VOMITING: 0
EYES NEGATIVE: 1
SHORTNESS OF BREATH: 1
DIARRHEA: 0
COUGH: 1
WHEEZING: 0
BACK PAIN: 1

## 2023-08-16 NOTE — PROGRESS NOTES
VW); Future    Long term systemic steroid user- stable   REFILL -     predniSONE (DELTASONE) 10 MG tablet; Take 1 tablet by mouth once daily    Personal history of tobacco use  -     MD VISIT TO DISCUSS LUNG CA SCREEN W LDCT  -     CT Lung Screen (Initial/Annual/Baseline); Future 2/2024     Fatigue, unspecified type  -     Comprehensive Metabolic Panel; Future    (Please note that portions of this note may have been with a voice recognition program.  Efforts were made to edit the dictation but occasionally words are mis-transcribed )     Will see Soraya Iyer in: 6 months  billing based on time: total time on encounter 35 min   Electronically signed by SEVERIANO Grant CNP on 8/16/2023 at 1:45 PM Discussed with the patient the current USPSTF guidelines released March 9, 2021 for screening for lung cancer. For adults aged 48 to 80 years who have a 20 pack-year smoking history and currently smoke or have quit within the past 15 years the grade B recommendation is to:  Screen for lung cancer with low-dose computed tomography (LDCT) every year. Stop screening once a person has not smoked for 15 years or has a health problem that limits life expectancy or the ability to have lung surgery. The patient  reports that she quit smoking about 2 years ago. Her smoking use included cigarettes. She started smoking about 45 years ago. She has a 42.00 pack-year smoking history. She has never used smokeless tobacco.. Discussed with patient the risks and benefits of screening, including over-diagnosis, false positive rate, and total radiation exposure. The patient currently exhibits no signs or symptoms suggestive of lung cancer. Discussed with patient the importance of compliance with yearly annual lung cancer screenings and willingness to undergo diagnosis and treatment if screening scan is positive.   In addition, the patient was counseled regarding the importance of remaining smoke free and/or total smoking

## 2023-09-20 DIAGNOSIS — J96.12 CHRONIC RESPIRATORY FAILURE WITH HYPOXIA AND HYPERCAPNIA (HCC): ICD-10-CM

## 2023-09-20 DIAGNOSIS — R07.9 CHEST PAIN, UNSPECIFIED TYPE: ICD-10-CM

## 2023-09-20 DIAGNOSIS — J96.11 CHRONIC RESPIRATORY FAILURE WITH HYPOXIA AND HYPERCAPNIA (HCC): ICD-10-CM

## 2023-09-20 DIAGNOSIS — J44.9 STAGE 4 VERY SEVERE COPD BY GOLD CLASSIFICATION (HCC): ICD-10-CM

## 2023-09-27 ENCOUNTER — TELEPHONE (OUTPATIENT)
Dept: PULMONOLOGY | Age: 68
End: 2023-09-27

## 2023-09-27 NOTE — TELEPHONE ENCOUNTER
----- Message from SEVERIANO George CNP sent at 9/26/2023  1:54 PM EDT -----  Blood sugar result is very high , sending results to patient's primary care physician, this is indicating that her diabetes is not controlled.  Patient should call to schedule follow up with Teresa Navarro PA-C

## 2023-10-27 ENCOUNTER — APPOINTMENT (OUTPATIENT)
Dept: GENERAL RADIOLOGY | Age: 68
DRG: 871 | End: 2023-10-27
Payer: COMMERCIAL

## 2023-10-27 ENCOUNTER — HOSPITAL ENCOUNTER (INPATIENT)
Age: 68
LOS: 7 days | Discharge: HOME HEALTH | DRG: 871 | End: 2023-11-03
Attending: EMERGENCY MEDICINE | Admitting: INTERNAL MEDICINE
Payer: COMMERCIAL

## 2023-10-27 DIAGNOSIS — J18.9 PNEUMONIA OF LEFT LOWER LOBE DUE TO INFECTIOUS ORGANISM: ICD-10-CM

## 2023-10-27 DIAGNOSIS — J44.9 STAGE 4 VERY SEVERE COPD BY GOLD CLASSIFICATION (HCC): ICD-10-CM

## 2023-10-27 DIAGNOSIS — J96.21 ACUTE ON CHRONIC RESPIRATORY FAILURE WITH HYPOXIA AND HYPERCAPNIA (HCC): Primary | ICD-10-CM

## 2023-10-27 DIAGNOSIS — J44.1 COPD WITH ACUTE EXACERBATION (HCC): ICD-10-CM

## 2023-10-27 DIAGNOSIS — U07.1 COVID-19 VIRUS INFECTION: ICD-10-CM

## 2023-10-27 DIAGNOSIS — Z79.52 LONG TERM SYSTEMIC STEROID USER: ICD-10-CM

## 2023-10-27 DIAGNOSIS — J96.22 ACUTE ON CHRONIC RESPIRATORY FAILURE WITH HYPOXIA AND HYPERCAPNIA (HCC): Primary | ICD-10-CM

## 2023-10-27 DIAGNOSIS — J44.1 COPD EXACERBATION (HCC): ICD-10-CM

## 2023-10-27 PROBLEM — A41.9 SEPSIS (HCC): Status: ACTIVE | Noted: 2023-10-27

## 2023-10-27 LAB
ALBUMIN SERPL BCG-MCNC: 3.7 G/DL (ref 3.5–5.1)
ALP SERPL-CCNC: 62 U/L (ref 38–126)
ALT SERPL W/O P-5'-P-CCNC: 24 U/L (ref 11–66)
AMMONIA PLAS-MCNC: 66 UMOL/L (ref 11–60)
ANION GAP SERPL CALC-SCNC: 10 MEQ/L (ref 8–16)
ANION GAP SERPL CALC-SCNC: 12 MEQ/L (ref 8–16)
AST SERPL-CCNC: 25 U/L (ref 5–40)
BASE EXCESS BLDA CALC-SCNC: 10.5 MMOL/L (ref -2.5–2.5)
BASE EXCESS BLDA CALC-SCNC: 11.6 MMOL/L (ref -2.5–2.5)
BASE EXCESS BLDA CALC-SCNC: 7.3 MMOL/L (ref -2.5–2.5)
BASOPHILS ABSOLUTE: 0 THOU/MM3 (ref 0–0.1)
BASOPHILS NFR BLD AUTO: 0.2 %
BDY SITE: ABNORMAL
BILIRUB CONJ SERPL-MCNC: < 0.2 MG/DL (ref 0–0.3)
BILIRUB SERPL-MCNC: 0.2 MG/DL (ref 0.3–1.2)
BREATHS SETTING VENT: 16 BPM
BREATHS SETTING VENT: 16 BPM
BUN SERPL-MCNC: 27 MG/DL (ref 7–22)
BUN SERPL-MCNC: 29 MG/DL (ref 7–22)
CALCIUM SERPL-MCNC: 10.3 MG/DL (ref 8.5–10.5)
CALCIUM SERPL-MCNC: 9.8 MG/DL (ref 8.5–10.5)
CHLORIDE SERPL-SCNC: 92 MEQ/L (ref 98–111)
CHLORIDE SERPL-SCNC: 94 MEQ/L (ref 98–111)
CO2 SERPL-SCNC: 36 MEQ/L (ref 23–33)
CO2 SERPL-SCNC: 37 MEQ/L (ref 23–33)
COLLECTED BY:: ABNORMAL
CREAT SERPL-MCNC: 0.8 MG/DL (ref 0.4–1.2)
CREAT SERPL-MCNC: 1 MG/DL (ref 0.4–1.2)
CRP SERPL-MCNC: 1.03 MG/DL (ref 0–1)
DEPRECATED RDW RBC AUTO: 58.1 FL (ref 35–45)
DEVICE: ABNORMAL
EKG ATRIAL RATE: 106 BPM
EKG P AXIS: 80 DEGREES
EKG P-R INTERVAL: 156 MS
EKG Q-T INTERVAL: 334 MS
EKG QRS DURATION: 132 MS
EKG QTC CALCULATION (BAZETT): 443 MS
EKG R AXIS: 59 DEGREES
EKG T AXIS: 39 DEGREES
EKG VENTRICULAR RATE: 106 BPM
EOSINOPHIL NFR BLD AUTO: 0 %
EOSINOPHILS ABSOLUTE: 0 THOU/MM3 (ref 0–0.4)
ERYTHROCYTE [DISTWIDTH] IN BLOOD BY AUTOMATED COUNT: 14.9 % (ref 11.5–14.5)
FERRITIN SERPL IA-MCNC: 98 NG/ML (ref 10–291)
FIO2 ON VENT O2 ANALYZER: 4 %
FIO2 ON VENT O2 ANALYZER: 45 %
FIO2 ON VENT O2 ANALYZER: 50 %
FLUAV RNA RESP QL NAA+PROBE: NOT DETECTED
FLUBV RNA RESP QL NAA+PROBE: NOT DETECTED
FOLATE SERPL-MCNC: > 20 NG/ML (ref 4.8–24.2)
GFR SERPL CREATININE-BSD FRML MDRD: > 60 ML/MIN/1.73M2
GFR SERPL CREATININE-BSD FRML MDRD: > 60 ML/MIN/1.73M2
GLUCOSE BLD STRIP.AUTO-MCNC: 206 MG/DL (ref 70–108)
GLUCOSE SERPL-MCNC: 216 MG/DL (ref 70–108)
GLUCOSE SERPL-MCNC: 224 MG/DL (ref 70–108)
HCO3 BLDA-SCNC: 36 MMOL/L (ref 23–28)
HCO3 BLDA-SCNC: 42 MMOL/L (ref 23–28)
HCO3 BLDA-SCNC: 45 MMOL/L (ref 23–28)
HCT VFR BLD AUTO: 42.8 % (ref 37–47)
HGB BLD-MCNC: 12.4 GM/DL (ref 12–16)
IMM GRANULOCYTES # BLD AUTO: 0.05 THOU/MM3 (ref 0–0.07)
IMM GRANULOCYTES NFR BLD AUTO: 0.5 %
LACTIC ACID, SEPSIS: 0.7 MMOL/L (ref 0.5–1.9)
LACTIC ACID, SEPSIS: 1.5 MMOL/L (ref 0.5–1.9)
LDH SERPL L TO P-CCNC: 128 U/L (ref 100–190)
LYMPHOCYTES ABSOLUTE: 1.1 THOU/MM3 (ref 1–4.8)
LYMPHOCYTES NFR BLD AUTO: 11.1 %
MCH RBC QN AUTO: 30.3 PG (ref 26–33)
MCHC RBC AUTO-ENTMCNC: 29 GM/DL (ref 32.2–35.5)
MCV RBC AUTO: 104.6 FL (ref 81–99)
MONOCYTES ABSOLUTE: 0.5 THOU/MM3 (ref 0.4–1.3)
MONOCYTES NFR BLD AUTO: 5.4 %
NEUTROPHILS NFR BLD AUTO: 82.8 %
NRBC BLD AUTO-RTO: 0 /100 WBC
NT-PROBNP SERPL IA-MCNC: 97.6 PG/ML (ref 0–124)
OSMOLALITY SERPL CALC.SUM OF ELEC: 291 MOSMOL/KG (ref 275–300)
OSMOLALITY SERPL CALC.SUM OF ELEC: 294.1 MOSMOL/KG (ref 275–300)
PCO2 BLDA: 110 MMHG (ref 35–45)
PCO2 BLDA: 72 MMHG (ref 35–45)
PCO2 BLDA: 92 MMHG (ref 35–45)
PEEP SETTING VENT: 10 MMHG
PEEP SETTING VENT: 10 MMHG
PH BLDA: 7.22 [PH] (ref 7.35–7.45)
PH BLDA: 7.27 [PH] (ref 7.35–7.45)
PH BLDA: 7.31 [PH] (ref 7.35–7.45)
PIP: 32 CMH2O
PLATELET # BLD AUTO: 224 THOU/MM3 (ref 130–400)
PMV BLD AUTO: 11.1 FL (ref 9.4–12.4)
PO2 BLDA: 26 MMHG (ref 71–104)
PO2 BLDA: 80 MMHG (ref 71–104)
PO2 BLDA: 93 MMHG (ref 71–104)
POTASSIUM SERPL-SCNC: 5.3 MEQ/L (ref 3.5–5.2)
POTASSIUM SERPL-SCNC: 5.5 MEQ/L (ref 3.5–5.2)
PRESSURE SUPPORT SETTING VENT: 22 CMH2O
PROCALCITONIN SERPL IA-MCNC: 0.1 NG/ML (ref 0.01–0.09)
PROT SERPL-MCNC: 6.8 G/DL (ref 6.1–8)
RBC # BLD AUTO: 4.09 MILL/MM3 (ref 4.2–5.4)
REASON FOR REJECTION: NORMAL
REJECTED TEST: NORMAL
SAO2 % BLDA: 32 %
SAO2 % BLDA: 94 %
SAO2 % BLDA: 95 %
SARS-COV-2 RNA RESP QL NAA+PROBE: DETECTED
SEGMENTED NEUTROPHILS ABSOLUTE COUNT: 8.3 THOU/MM3 (ref 1.8–7.7)
SODIUM SERPL-SCNC: 140 MEQ/L (ref 135–145)
SODIUM SERPL-SCNC: 141 MEQ/L (ref 135–145)
TROPONIN, HIGH SENSITIVITY: 16 NG/L (ref 0–12)
VIT B12 SERPL-MCNC: > 2000 PG/ML (ref 211–911)
WBC # BLD AUTO: 10 THOU/MM3 (ref 4.8–10.8)

## 2023-10-27 PROCEDURE — 93005 ELECTROCARDIOGRAM TRACING: CPT | Performed by: EMERGENCY MEDICINE

## 2023-10-27 PROCEDURE — 82607 VITAMIN B-12: CPT

## 2023-10-27 PROCEDURE — 83615 LACTATE (LD) (LDH) ENZYME: CPT

## 2023-10-27 PROCEDURE — 2700000000 HC OXYGEN THERAPY PER DAY

## 2023-10-27 PROCEDURE — 83880 ASSAY OF NATRIURETIC PEPTIDE: CPT

## 2023-10-27 PROCEDURE — 2580000003 HC RX 258: Performed by: EMERGENCY MEDICINE

## 2023-10-27 PROCEDURE — 36415 COLL VENOUS BLD VENIPUNCTURE: CPT

## 2023-10-27 PROCEDURE — 87040 BLOOD CULTURE FOR BACTERIA: CPT

## 2023-10-27 PROCEDURE — 94761 N-INVAS EAR/PLS OXIMETRY MLT: CPT

## 2023-10-27 PROCEDURE — 6360000002 HC RX W HCPCS: Performed by: EMERGENCY MEDICINE

## 2023-10-27 PROCEDURE — 82728 ASSAY OF FERRITIN: CPT

## 2023-10-27 PROCEDURE — 87801 DETECT AGNT MULT DNA AMPLI: CPT

## 2023-10-27 PROCEDURE — 96374 THER/PROPH/DIAG INJ IV PUSH: CPT

## 2023-10-27 PROCEDURE — 82140 ASSAY OF AMMONIA: CPT

## 2023-10-27 PROCEDURE — 87636 SARSCOV2 & INF A&B AMP PRB: CPT

## 2023-10-27 PROCEDURE — 6370000000 HC RX 637 (ALT 250 FOR IP): Performed by: EMERGENCY MEDICINE

## 2023-10-27 PROCEDURE — 82746 ASSAY OF FOLIC ACID SERUM: CPT

## 2023-10-27 PROCEDURE — 84145 PROCALCITONIN (PCT): CPT

## 2023-10-27 PROCEDURE — 2580000003 HC RX 258

## 2023-10-27 PROCEDURE — 2060000000 HC ICU INTERMEDIATE R&B

## 2023-10-27 PROCEDURE — 87147 CULTURE TYPE IMMUNOLOGIC: CPT

## 2023-10-27 PROCEDURE — 94640 AIRWAY INHALATION TREATMENT: CPT

## 2023-10-27 PROCEDURE — 99285 EMERGENCY DEPT VISIT HI MDM: CPT

## 2023-10-27 PROCEDURE — 6360000002 HC RX W HCPCS

## 2023-10-27 PROCEDURE — 80048 BASIC METABOLIC PNL TOTAL CA: CPT

## 2023-10-27 PROCEDURE — 99223 1ST HOSP IP/OBS HIGH 75: CPT | Performed by: INTERNAL MEDICINE

## 2023-10-27 PROCEDURE — 83605 ASSAY OF LACTIC ACID: CPT

## 2023-10-27 PROCEDURE — 86140 C-REACTIVE PROTEIN: CPT

## 2023-10-27 PROCEDURE — 80076 HEPATIC FUNCTION PANEL: CPT

## 2023-10-27 PROCEDURE — 93010 ELECTROCARDIOGRAM REPORT: CPT | Performed by: INTERNAL MEDICINE

## 2023-10-27 PROCEDURE — 6370000000 HC RX 637 (ALT 250 FOR IP)

## 2023-10-27 PROCEDURE — 36600 WITHDRAWAL OF ARTERIAL BLOOD: CPT

## 2023-10-27 PROCEDURE — 82948 REAGENT STRIP/BLOOD GLUCOSE: CPT

## 2023-10-27 PROCEDURE — 5A09457 ASSISTANCE WITH RESPIRATORY VENTILATION, 24-96 CONSECUTIVE HOURS, CONTINUOUS POSITIVE AIRWAY PRESSURE: ICD-10-PCS | Performed by: INTERNAL MEDICINE

## 2023-10-27 PROCEDURE — 84484 ASSAY OF TROPONIN QUANT: CPT

## 2023-10-27 PROCEDURE — 94660 CPAP INITIATION&MGMT: CPT

## 2023-10-27 PROCEDURE — 82803 BLOOD GASES ANY COMBINATION: CPT

## 2023-10-27 PROCEDURE — 85025 COMPLETE CBC W/AUTO DIFF WBC: CPT

## 2023-10-27 PROCEDURE — 71045 X-RAY EXAM CHEST 1 VIEW: CPT

## 2023-10-27 RX ORDER — IPRATROPIUM BROMIDE AND ALBUTEROL SULFATE 2.5; .5 MG/3ML; MG/3ML
1 SOLUTION RESPIRATORY (INHALATION) ONCE
Status: COMPLETED | OUTPATIENT
Start: 2023-10-27 | End: 2023-10-27

## 2023-10-27 RX ORDER — SODIUM CHLORIDE 0.9 % (FLUSH) 0.9 %
5-40 SYRINGE (ML) INJECTION EVERY 12 HOURS SCHEDULED
Status: DISCONTINUED | OUTPATIENT
Start: 2023-10-27 | End: 2023-11-03 | Stop reason: HOSPADM

## 2023-10-27 RX ORDER — ONDANSETRON 2 MG/ML
4 INJECTION INTRAMUSCULAR; INTRAVENOUS EVERY 6 HOURS PRN
Status: DISCONTINUED | OUTPATIENT
Start: 2023-10-27 | End: 2023-11-03 | Stop reason: HOSPADM

## 2023-10-27 RX ORDER — HYDROCHLOROTHIAZIDE 25 MG/1
25 TABLET ORAL DAILY
Status: DISCONTINUED | OUTPATIENT
Start: 2023-10-27 | End: 2023-10-30

## 2023-10-27 RX ORDER — SODIUM CHLORIDE, SODIUM LACTATE, POTASSIUM CHLORIDE, AND CALCIUM CHLORIDE .6; .31; .03; .02 G/100ML; G/100ML; G/100ML; G/100ML
1000 INJECTION, SOLUTION INTRAVENOUS ONCE
Status: COMPLETED | OUTPATIENT
Start: 2023-10-27 | End: 2023-10-27

## 2023-10-27 RX ORDER — IPRATROPIUM BROMIDE AND ALBUTEROL SULFATE 2.5; .5 MG/3ML; MG/3ML
1 SOLUTION RESPIRATORY (INHALATION) EVERY 4 HOURS PRN
Status: DISCONTINUED | OUTPATIENT
Start: 2023-10-27 | End: 2023-10-28

## 2023-10-27 RX ORDER — METOPROLOL TARTRATE 50 MG/1
50 TABLET, FILM COATED ORAL 2 TIMES DAILY
Status: DISCONTINUED | OUTPATIENT
Start: 2023-10-27 | End: 2023-11-03 | Stop reason: HOSPADM

## 2023-10-27 RX ORDER — IBUPROFEN 600 MG/1
1 TABLET ORAL PRN
Status: DISCONTINUED | OUTPATIENT
Start: 2023-10-27 | End: 2023-11-03 | Stop reason: HOSPADM

## 2023-10-27 RX ORDER — INSULIN LISPRO 100 [IU]/ML
0-4 INJECTION, SOLUTION INTRAVENOUS; SUBCUTANEOUS
Status: DISCONTINUED | OUTPATIENT
Start: 2023-10-27 | End: 2023-10-28

## 2023-10-27 RX ORDER — LISINOPRIL 20 MG/1
20 TABLET ORAL DAILY
Status: DISCONTINUED | OUTPATIENT
Start: 2023-10-27 | End: 2023-11-03 | Stop reason: HOSPADM

## 2023-10-27 RX ORDER — SODIUM CHLORIDE, SODIUM LACTATE, POTASSIUM CHLORIDE, CALCIUM CHLORIDE 600; 310; 30; 20 MG/100ML; MG/100ML; MG/100ML; MG/100ML
INJECTION, SOLUTION INTRAVENOUS CONTINUOUS
Status: DISCONTINUED | OUTPATIENT
Start: 2023-10-27 | End: 2023-10-30

## 2023-10-27 RX ORDER — ALBUTEROL SULFATE 90 UG/1
2 AEROSOL, METERED RESPIRATORY (INHALATION) EVERY 4 HOURS PRN
Status: DISCONTINUED | OUTPATIENT
Start: 2023-10-27 | End: 2023-11-03 | Stop reason: HOSPADM

## 2023-10-27 RX ORDER — PRAVASTATIN SODIUM 10 MG
10 TABLET ORAL
Status: DISCONTINUED | OUTPATIENT
Start: 2023-10-27 | End: 2023-11-03 | Stop reason: HOSPADM

## 2023-10-27 RX ORDER — ROFLUMILAST 500 UG/1
250 TABLET ORAL DAILY
Status: DISCONTINUED | OUTPATIENT
Start: 2023-10-27 | End: 2023-11-03 | Stop reason: HOSPADM

## 2023-10-27 RX ORDER — PREDNISONE 1 MG/1
1 TABLET ORAL DAILY
Status: DISCONTINUED | OUTPATIENT
Start: 2023-10-27 | End: 2023-10-28

## 2023-10-27 RX ORDER — ACETAMINOPHEN 650 MG/1
650 SUPPOSITORY RECTAL EVERY 6 HOURS PRN
Status: DISCONTINUED | OUTPATIENT
Start: 2023-10-27 | End: 2023-11-03 | Stop reason: HOSPADM

## 2023-10-27 RX ORDER — INSULIN LISPRO 100 [IU]/ML
0-4 INJECTION, SOLUTION INTRAVENOUS; SUBCUTANEOUS NIGHTLY
Status: DISCONTINUED | OUTPATIENT
Start: 2023-10-27 | End: 2023-10-28

## 2023-10-27 RX ORDER — ASPIRIN 81 MG/1
81 TABLET, CHEWABLE ORAL DAILY
Status: DISCONTINUED | OUTPATIENT
Start: 2023-10-27 | End: 2023-11-03 | Stop reason: HOSPADM

## 2023-10-27 RX ORDER — ARFORMOTEROL TARTRATE 15 UG/2ML
15 SOLUTION RESPIRATORY (INHALATION)
Status: DISCONTINUED | OUTPATIENT
Start: 2023-10-27 | End: 2023-10-30

## 2023-10-27 RX ORDER — POLYETHYLENE GLYCOL 3350 17 G/17G
17 POWDER, FOR SOLUTION ORAL DAILY PRN
Status: DISCONTINUED | OUTPATIENT
Start: 2023-10-27 | End: 2023-11-03 | Stop reason: HOSPADM

## 2023-10-27 RX ORDER — SODIUM CHLORIDE 0.9 % (FLUSH) 0.9 %
5-40 SYRINGE (ML) INJECTION PRN
Status: DISCONTINUED | OUTPATIENT
Start: 2023-10-27 | End: 2023-11-03 | Stop reason: HOSPADM

## 2023-10-27 RX ORDER — DEXAMETHASONE SODIUM PHOSPHATE 4 MG/ML
6 INJECTION, SOLUTION INTRA-ARTICULAR; INTRALESIONAL; INTRAMUSCULAR; INTRAVENOUS; SOFT TISSUE EVERY 24 HOURS
Status: DISCONTINUED | OUTPATIENT
Start: 2023-10-27 | End: 2023-10-28

## 2023-10-27 RX ORDER — ENOXAPARIN SODIUM 100 MG/ML
30 INJECTION SUBCUTANEOUS 2 TIMES DAILY
Status: DISCONTINUED | OUTPATIENT
Start: 2023-10-28 | End: 2023-11-03 | Stop reason: HOSPADM

## 2023-10-27 RX ORDER — SODIUM CHLORIDE 9 MG/ML
INJECTION, SOLUTION INTRAVENOUS PRN
Status: DISCONTINUED | OUTPATIENT
Start: 2023-10-27 | End: 2023-11-03 | Stop reason: HOSPADM

## 2023-10-27 RX ORDER — ONDANSETRON 4 MG/1
4 TABLET, ORALLY DISINTEGRATING ORAL EVERY 8 HOURS PRN
Status: DISCONTINUED | OUTPATIENT
Start: 2023-10-27 | End: 2023-11-03 | Stop reason: HOSPADM

## 2023-10-27 RX ORDER — FUROSEMIDE 40 MG/1
80 TABLET ORAL DAILY
Status: DISCONTINUED | OUTPATIENT
Start: 2023-10-27 | End: 2023-11-02

## 2023-10-27 RX ORDER — DEXTROSE MONOHYDRATE 100 MG/ML
INJECTION, SOLUTION INTRAVENOUS CONTINUOUS PRN
Status: DISCONTINUED | OUTPATIENT
Start: 2023-10-27 | End: 2023-11-03 | Stop reason: HOSPADM

## 2023-10-27 RX ORDER — ACETAMINOPHEN 325 MG/1
650 TABLET ORAL EVERY 6 HOURS PRN
Status: DISCONTINUED | OUTPATIENT
Start: 2023-10-27 | End: 2023-11-03 | Stop reason: HOSPADM

## 2023-10-27 RX ORDER — INSULIN GLARGINE 100 [IU]/ML
10 INJECTION, SOLUTION SUBCUTANEOUS NIGHTLY
Status: DISCONTINUED | OUTPATIENT
Start: 2023-10-27 | End: 2023-10-27

## 2023-10-27 RX ORDER — 0.9 % SODIUM CHLORIDE 0.9 %
30 INTRAVENOUS SOLUTION INTRAVENOUS ONCE
Status: COMPLETED | OUTPATIENT
Start: 2023-10-27 | End: 2023-10-27

## 2023-10-27 RX ORDER — IPRATROPIUM BROMIDE AND ALBUTEROL SULFATE 2.5; .5 MG/3ML; MG/3ML
1 SOLUTION RESPIRATORY (INHALATION)
Status: COMPLETED | OUTPATIENT
Start: 2023-10-27 | End: 2023-10-27

## 2023-10-27 RX ORDER — ASCORBIC ACID 500 MG
500 TABLET ORAL DAILY
Status: DISCONTINUED | OUTPATIENT
Start: 2023-10-27 | End: 2023-11-03 | Stop reason: HOSPADM

## 2023-10-27 RX ORDER — VITAMIN B COMPLEX
1000 TABLET ORAL DAILY
Status: DISCONTINUED | OUTPATIENT
Start: 2023-10-27 | End: 2023-11-03 | Stop reason: HOSPADM

## 2023-10-27 RX ADMIN — IPRATROPIUM BROMIDE AND ALBUTEROL SULFATE 1 DOSE: .5; 3 SOLUTION RESPIRATORY (INHALATION) at 13:24

## 2023-10-27 RX ADMIN — WATER 125 MG: 1 INJECTION INTRAMUSCULAR; INTRAVENOUS; SUBCUTANEOUS at 14:57

## 2023-10-27 RX ADMIN — IPRATROPIUM BROMIDE AND ALBUTEROL SULFATE 1 DOSE: .5; 3 SOLUTION RESPIRATORY (INHALATION) at 13:37

## 2023-10-27 RX ADMIN — ARFORMOTEROL TARTRATE 15 MCG: 15 SOLUTION RESPIRATORY (INHALATION) at 21:47

## 2023-10-27 RX ADMIN — SODIUM CHLORIDE 1779 ML: 9 INJECTION, SOLUTION INTRAVENOUS at 17:07

## 2023-10-27 RX ADMIN — SODIUM CHLORIDE, POTASSIUM CHLORIDE, SODIUM LACTATE AND CALCIUM CHLORIDE 1000 ML: 600; 310; 30; 20 INJECTION, SOLUTION INTRAVENOUS at 22:00

## 2023-10-27 RX ADMIN — ACETAMINOPHEN 650 MG: 325 TABLET ORAL at 22:02

## 2023-10-27 RX ADMIN — IPRATROPIUM BROMIDE AND ALBUTEROL SULFATE 1 DOSE: .5; 3 SOLUTION RESPIRATORY (INHALATION) at 13:30

## 2023-10-27 RX ADMIN — DEXAMETHASONE SODIUM PHOSPHATE 6 MG: 4 INJECTION, SOLUTION INTRA-ARTICULAR; INTRALESIONAL; INTRAMUSCULAR; INTRAVENOUS; SOFT TISSUE at 21:59

## 2023-10-27 RX ADMIN — ONDANSETRON 4 MG: 2 INJECTION INTRAMUSCULAR; INTRAVENOUS at 22:08

## 2023-10-27 ASSESSMENT — PAIN - FUNCTIONAL ASSESSMENT
PAIN_FUNCTIONAL_ASSESSMENT: 0-10
PAIN_FUNCTIONAL_ASSESSMENT: ACTIVITIES ARE NOT PREVENTED

## 2023-10-27 ASSESSMENT — PAIN DESCRIPTION - DESCRIPTORS: DESCRIPTORS: ACHING;DISCOMFORT

## 2023-10-27 ASSESSMENT — LIFESTYLE VARIABLES
HOW MANY STANDARD DRINKS CONTAINING ALCOHOL DO YOU HAVE ON A TYPICAL DAY: PATIENT DOES NOT DRINK
HOW OFTEN DO YOU HAVE A DRINK CONTAINING ALCOHOL: NEVER

## 2023-10-27 ASSESSMENT — PAIN SCALES - GENERAL
PAINLEVEL_OUTOF10: 0
PAINLEVEL_OUTOF10: 8
PAINLEVEL_OUTOF10: 3

## 2023-10-27 ASSESSMENT — PAIN DESCRIPTION - LOCATION
LOCATION: NECK
LOCATION: HIP

## 2023-10-27 ASSESSMENT — PAIN DESCRIPTION - ORIENTATION: ORIENTATION: LEFT

## 2023-10-27 ASSESSMENT — PAIN DESCRIPTION - PAIN TYPE: TYPE: CHRONIC PAIN

## 2023-10-27 NOTE — H&P
Internal Medicine Resident History and Physical          Name: Vitor Wallace, female, : 1955, MRN: [id number]    PCP: SUSAN Marinelli    Date of Admission: 10/27/2023  Date of Service: Pt seen/examined on 10/27/23  and Admitted to Inpatient with expected LOS greater than two midnights due to medical therapy. Assessment and Plan:  Acute Hypercapnic and Hypoxic Respiratory Failure 2/2 COPD exacerbation d/t COVID-19 infection/PNA?: Active. S/s started 10/23, tested positive 10/27 in ED. Patient tachypneic and hypoxic on arrival at 70% on 6L NC, was escalated to BiPAP. Experiencing SOB, fatigue, weakness, worsening cough. Given Duoneb x3 doses and 1 dose Solumedrol in ED. Started on 30 cc/kg NS bolus d/t concern for sepsis. Procal 0.1, LA 1.5. ABG initially showed pH 7.22, pCO2 110, pO2 26, HCO3 45. Improved after BiPAP to 7.27/92/93/42  IV Decadron 6mg QD x10 days  Baricitinib 4mg QD; pharmacy to adjust dose as needed  Holding off on starting abx d/t normal LA   Starting Vit D, Vit C  Blood cx's, urine cx, PNA panel, respiratory cx pending  Starting IV LR at 100 cc/hr   Replace electrolytes as needed; monitor with daily BMP  Analgesics + antiemetics PRN  Repeat ABG   Acapella, incentive spirometry  Wean SpO2 to maintain sat >92% as tolerated   COPD GOLD Stage 4D, acute exacerbation: likely 2/2 COVID-19 infection/possible PNA. On 4L O2 at baseline, currently on BiPAP with FiO2 45. CXR showing possible small left-sided pleural effusion. Decadron 6mg QD x10 days  DuoNebs q4Hr WA  Respiratory Culture + Molecular PNA panel  Acapella, incentive spirometry  Wean FiO2/O2 as tolerated for goal SpO2 >92%  Continue home inhalers/nebs and Roflumilast  COVID-19 infection: tested positive in ED 10/27. Possible underlying PNA. Respiratory cx + molecular PNA panel pending. Plan as above in #1. Droplet precautions.   Hypochloremic metabolic acidosis: d/t contraction alkalosis 2/2 diarrhea for past 3 weeks

## 2023-10-27 NOTE — ED NOTES
Pt comes to ED with c/o dizziness, SOB and fatigue. Pt states she has COPD and usually on 4 L NC. Pt was 78% on that. PT placed on non rebreather at 10 liters. Pt pale as well. Pt states slow to respond. Pt shaking and jerking. Pt states she was around someone with a cold recently. Pt states she is coughing and more SOB.      Alexis Holt RN  10/27/23 7719

## 2023-10-27 NOTE — ED NOTES
Pt Vivienne verbal order to remove non rebreather and placed pt back on NC at this time.       Lupe Gutiérrez RN  10/27/23 4509

## 2023-10-27 NOTE — ED PROVIDER NOTES
Gas 7.31 (L) 7.35 - 7.45    PCO2 72 (HH) 35 - 45 mmhg    PO2 80 71 - 104 mmhg    HCO3 36 (H) 23 - 28 mmol/l    Base Excess (Calculated) 7.3 (H) -2.5 - 2.5 mmol/l    O2 Sat 94 %    IFIO2 45     DEVICE BiPAP     SET RESPIRATORY RATE 16 bpm    SET PEEP 10.0 mmhg    PIP 32 cmH2O    Source: R Radial     COLLECTED BY: [id number]    C-Reactive Protein   Result Value Ref Range    CRP 1.03 (H) 0.00 - 1.00 mg/dl   Anion Gap   Result Value Ref Range    Anion Gap 10.0 8.0 - 16.0 meq/L   Glomerular Filtration Rate, Estimated   Result Value Ref Range    Est, Glom Filt Rate >60 >60 ml/min/1.73m2   Osmolality   Result Value Ref Range    Osmolality Calc 294.1 275.0 - 300.0 mOsmol/kg   POCT Glucose   Result Value Ref Range    POC Glucose 206 (H) 70 - 108 mg/dl   EKG 12 Lead   Result Value Ref Range    Ventricular Rate 106 BPM    Atrial Rate 106 BPM    P-R Interval 156 ms    QRS Duration 132 ms    Q-T Interval 334 ms    QTc Calculation (Bazett) 443 ms    P Axis 80 degrees    R Axis 59 degrees    T Axis 39 degrees     (Any cultures that may have been sent were not resulted at the time of this patient visit)    [facility]) and ED COURSE   Patient was seen and evaluated at 1:05 PM EDT. DDx include but not limited to: Includes but not limited to: Pneumonia, bronchitis, CHF, PE, COPD, asthma, pulmonary edema, pleural effusion, AMI, URI, influenza, sinusitis, allergies, anxiety      Initial management include:   Large-bore IV  BiPAP  ABG to adjust BiPAP setting  Labs  Chest x-ray    Summary:    Patient arrives with respiratory distress, BiPAP is applied. ABG shows acute on chronic respiratory failure with hypoxia and hypercapnia. Patient is given DuoNeb x3 and IV Solu-Medrol. ED work-ups are consistent with COPD exacerbation due to COVID-19 infection/CAP, but procalcitonin 0.1, will hold off antibiotics as low suspicion of serious lower respiratory tract infection.     Repeat ABG shows significantly improved respiratory

## 2023-10-27 NOTE — ED NOTES
Manual /78. Patient resting in bed. Respirations easy and unlabored. No distress noted. Call light within reach. Family at bedside.      Yumiko Hurt RN  10/27/23 3999

## 2023-10-28 LAB
ACB COMPLEX DNA BLD POS QL NAA+NON-PROBE: NOT DETECTED
AMMONIA PLAS-MCNC: 37 UMOL/L (ref 11–60)
ANION GAP SERPL CALC-SCNC: 11 MEQ/L (ref 8–16)
ARTERIAL PATENCY WRIST A: POSITIVE
B FRAGILIS DNA BLD POS QL NAA+NON-PROBE: NOT DETECTED
BACTERIA URNS QL MICRO: ABNORMAL /HPF
BASE EXCESS BLDA CALC-SCNC: 8 MMOL/L (ref -2.5–2.5)
BDY SITE: ABNORMAL
BILIRUB UR QL STRIP.AUTO: NEGATIVE
BLACTX-M ISLT/SPM QL: ABNORMAL
BLAIMP ISLT/SPM QL: ABNORMAL
BLAKPC ISLT/SPM QL: ABNORMAL
BLAOXA-48-LIKE ISLT/SPM QL: ABNORMAL
BLAVIM ISLT/SPM QL: ABNORMAL
BOTTLE TYPE: ABNORMAL
BREATHS SETTING VENT: 16 BPM
BUN SERPL-MCNC: 40 MG/DL (ref 7–22)
C ALBICANS DNA BLD POS QL NAA+NON-PROBE: NOT DETECTED
C AURIS DNA BLD POS QL NAA+NON-PROBE: NOT DETECTED
C GATTII+NEOFOR DNA BLD POS QL NAA+N-PRB: NOT DETECTED
C GLABRATA DNA BLD POS QL NAA+NON-PROBE: NOT DETECTED
C KRUSEI DNA BLD POS QL NAA+NON-PROBE: NOT DETECTED
C PARAP DNA BLD POS QL NAA+NON-PROBE: NOT DETECTED
C TROPICLS DNA BLD POS QL NAA+NON-PROBE: NOT DETECTED
CALCIUM SERPL-MCNC: 9.3 MG/DL (ref 8.5–10.5)
CASTS #/AREA URNS LPF: ABNORMAL /LPF
CASTS 2: ABNORMAL /LPF
CHARACTER UR: CLEAR
CHLORIDE SERPL-SCNC: 95 MEQ/L (ref 98–111)
CO2 SERPL-SCNC: 33 MEQ/L (ref 23–33)
COAG NEG STAPH DNA BLD QL NAA+PROBE: DETECTED
COLISTIN RES MCR-1 ISLT/SPM QL: ABNORMAL
COLLECTED BY:: ABNORMAL
COLOR: YELLOW
CREAT SERPL-MCNC: 1 MG/DL (ref 0.4–1.2)
CRYSTALS URNS MICRO: ABNORMAL
DEPRECATED RDW RBC AUTO: 57.9 FL (ref 35–45)
DEVICE: ABNORMAL
E CLOAC COMP DNA BLD POS NAA+NON-PROBE: NOT DETECTED
E COLI DNA BLD POS QL NAA+NON-PROBE: NOT DETECTED
E FAECALIS DNA BLD POS QL NAA+NON-PROBE: NOT DETECTED
E FAECIUM DNA BLD POS QL NAA+NON-PROBE: NOT DETECTED
ENTEROBACTERALES DNA BLD POS NAA+N-PRB: NOT DETECTED
EPITHELIAL CELLS, UA: ABNORMAL /HPF
ERYTHROCYTE [DISTWIDTH] IN BLOOD BY AUTOMATED COUNT: 14.6 % (ref 11.5–14.5)
FIO2 ON VENT O2 ANALYZER: 45 %
GFR SERPL CREATININE-BSD FRML MDRD: > 60 ML/MIN/1.73M2
GLUCOSE BLD STRIP.AUTO-MCNC: 214 MG/DL (ref 70–108)
GLUCOSE BLD STRIP.AUTO-MCNC: 232 MG/DL (ref 70–108)
GLUCOSE BLD STRIP.AUTO-MCNC: 241 MG/DL (ref 70–108)
GLUCOSE BLD STRIP.AUTO-MCNC: 290 MG/DL (ref 70–108)
GLUCOSE BLD STRIP.AUTO-MCNC: 298 MG/DL (ref 70–108)
GLUCOSE SERPL-MCNC: 245 MG/DL (ref 70–108)
GLUCOSE UR QL STRIP.AUTO: NEGATIVE MG/DL
GP B STREP DNA SPEC QL NAA+PROBE: NOT DETECTED
GP B STREP DNA SPEC QL NAA+PROBE: NOT DETECTED
HAEM INFLU DNA BLD POS QL NAA+NON-PROBE: NOT DETECTED
HCO3 BLDA-SCNC: 37 MMOL/L (ref 23–28)
HCT VFR BLD AUTO: 38.9 % (ref 37–47)
HGB BLD-MCNC: 11 GM/DL (ref 12–16)
HGB UR QL STRIP.AUTO: NEGATIVE
K OXYTOCA DNA BLD POS QL NAA+NON-PROBE: NOT DETECTED
K OXYTOCA DNA BLD POS QL NAA+NON-PROBE: NOT DETECTED
KETONES UR QL STRIP.AUTO: 15
KLEBSIELLA SP DNA BLD POS QL NAA+NON-PRB: NOT DETECTED
L MONOCYTOG DNA BLD POS QL NAA+NON-PROBE: NOT DETECTED
MCH RBC QN AUTO: 30 PG (ref 26–33)
MCHC RBC AUTO-ENTMCNC: 28.3 GM/DL (ref 32.2–35.5)
MCV RBC AUTO: 106 FL (ref 81–99)
MECA ISLT/SPM QL: DETECTED
MECA+MECC+MREJ ISLT/SPM QL: ABNORMAL
MISCELLANEOUS 2: ABNORMAL
N MEN DNA BLD POS QL NAA+NON-PROBE: NOT DETECTED
NDM: ABNORMAL
NITRITE UR QL STRIP: NEGATIVE
P AERUGINOSA DNA BLD POS NAA+NON-PROBE: NOT DETECTED
PCO2 BLDA: 75 MMHG (ref 35–45)
PEEP SETTING VENT: 10 MMHG
PH BLDA: 7.3 [PH] (ref 7.35–7.45)
PH UR STRIP.AUTO: 5 [PH] (ref 5–9)
PIP: 32 CMH2O
PLATELET # BLD AUTO: 172 THOU/MM3 (ref 130–400)
PMV BLD AUTO: 11.1 FL (ref 9.4–12.4)
PO2 BLDA: 91 MMHG (ref 71–104)
POTASSIUM SERPL-SCNC: 5.1 MEQ/L (ref 3.5–5.2)
PRESSURE SUPPORT SETTING VENT: 22 CMH2O
PROT UR STRIP.AUTO-MCNC: ABNORMAL MG/DL
PROTEUS SPP: NOT DETECTED
RBC # BLD AUTO: 3.67 MILL/MM3 (ref 4.2–5.4)
RBC URINE: ABNORMAL /HPF
RENAL EPI CELLS #/AREA URNS HPF: ABNORMAL /[HPF]
S AUREUS DNA BLD POS QL NAA+NON-PROBE: NOT DETECTED
S EPIDERMIDIS DNA BLD POS QL NAA+NON-PRB: DETECTED
S LUGDUNENSIS DNA BLD POS QL NAA+NON-PRB: NOT DETECTED
S MALTOPHILIA DNA BLD POS QL NAA+NON-PRB: NOT DETECTED
S MARCESCENS DNA BLD POS NAA+NON-PROBE: NOT DETECTED
S PYO DNA THROAT QL NAA+PROBE: NOT DETECTED
SALMONELLA DNA BLD POS QL NAA+NON-PROBE: NOT DETECTED
SAO2 % BLDA: 96 %
SODIUM SERPL-SCNC: 139 MEQ/L (ref 135–145)
SOURCE OF BLOOD CULTURE: ABNORMAL
SP GR UR REFRACT.AUTO: 1.02 (ref 1–1.03)
STREPTOCOCCUS DNA BLD QL NAA+PROBE: NOT DETECTED
UROBILINOGEN, URINE: 0.2 EU/DL (ref 0–1)
VANA+VANB ISLT/SPM QL: ABNORMAL
VENTILATION MODE VENT: ABNORMAL
WBC # BLD AUTO: 3.9 THOU/MM3 (ref 4.8–10.8)
WBC #/AREA URNS HPF: ABNORMAL /HPF
WBC #/AREA URNS HPF: NEGATIVE /[HPF]
YEAST LIKE FUNGI URNS QL MICRO: ABNORMAL

## 2023-10-28 PROCEDURE — 6370000000 HC RX 637 (ALT 250 FOR IP)

## 2023-10-28 PROCEDURE — 2060000000 HC ICU INTERMEDIATE R&B

## 2023-10-28 PROCEDURE — 6360000002 HC RX W HCPCS: Performed by: STUDENT IN AN ORGANIZED HEALTH CARE EDUCATION/TRAINING PROGRAM

## 2023-10-28 PROCEDURE — 94640 AIRWAY INHALATION TREATMENT: CPT

## 2023-10-28 PROCEDURE — 99232 SBSQ HOSP IP/OBS MODERATE 35: CPT | Performed by: INTERNAL MEDICINE

## 2023-10-28 PROCEDURE — 82803 BLOOD GASES ANY COMBINATION: CPT

## 2023-10-28 PROCEDURE — 94660 CPAP INITIATION&MGMT: CPT

## 2023-10-28 PROCEDURE — 82948 REAGENT STRIP/BLOOD GLUCOSE: CPT

## 2023-10-28 PROCEDURE — 81001 URINALYSIS AUTO W/SCOPE: CPT

## 2023-10-28 PROCEDURE — 2580000003 HC RX 258

## 2023-10-28 PROCEDURE — 94761 N-INVAS EAR/PLS OXIMETRY MLT: CPT

## 2023-10-28 PROCEDURE — 80048 BASIC METABOLIC PNL TOTAL CA: CPT

## 2023-10-28 PROCEDURE — 6370000000 HC RX 637 (ALT 250 FOR IP): Performed by: STUDENT IN AN ORGANIZED HEALTH CARE EDUCATION/TRAINING PROGRAM

## 2023-10-28 PROCEDURE — 6360000002 HC RX W HCPCS

## 2023-10-28 PROCEDURE — 94669 MECHANICAL CHEST WALL OSCILL: CPT

## 2023-10-28 PROCEDURE — 82140 ASSAY OF AMMONIA: CPT

## 2023-10-28 PROCEDURE — 2700000000 HC OXYGEN THERAPY PER DAY

## 2023-10-28 PROCEDURE — 51798 US URINE CAPACITY MEASURE: CPT

## 2023-10-28 PROCEDURE — 36600 WITHDRAWAL OF ARTERIAL BLOOD: CPT

## 2023-10-28 PROCEDURE — 36415 COLL VENOUS BLD VENIPUNCTURE: CPT

## 2023-10-28 PROCEDURE — 85027 COMPLETE CBC AUTOMATED: CPT

## 2023-10-28 RX ORDER — IPRATROPIUM BROMIDE AND ALBUTEROL SULFATE 2.5; .5 MG/3ML; MG/3ML
1 SOLUTION RESPIRATORY (INHALATION) 3 TIMES DAILY
Status: DISCONTINUED | OUTPATIENT
Start: 2023-10-28 | End: 2023-10-28

## 2023-10-28 RX ORDER — DEXAMETHASONE SODIUM PHOSPHATE 4 MG/ML
6 INJECTION, SOLUTION INTRA-ARTICULAR; INTRALESIONAL; INTRAMUSCULAR; INTRAVENOUS; SOFT TISSUE EVERY 24 HOURS
Status: DISCONTINUED | OUTPATIENT
Start: 2023-10-28 | End: 2023-10-30

## 2023-10-28 RX ORDER — INSULIN LISPRO 100 [IU]/ML
0-4 INJECTION, SOLUTION INTRAVENOUS; SUBCUTANEOUS NIGHTLY
Status: DISCONTINUED | OUTPATIENT
Start: 2023-10-28 | End: 2023-10-29

## 2023-10-28 RX ORDER — IPRATROPIUM BROMIDE AND ALBUTEROL SULFATE 2.5; .5 MG/3ML; MG/3ML
1 SOLUTION RESPIRATORY (INHALATION)
Status: DISCONTINUED | OUTPATIENT
Start: 2023-10-28 | End: 2023-10-30

## 2023-10-28 RX ORDER — ALBUTEROL SULFATE 2.5 MG/3ML
2.5 SOLUTION RESPIRATORY (INHALATION) EVERY 4 HOURS PRN
Status: DISCONTINUED | OUTPATIENT
Start: 2023-10-28 | End: 2023-11-03 | Stop reason: HOSPADM

## 2023-10-28 RX ORDER — INSULIN LISPRO 100 [IU]/ML
0-8 INJECTION, SOLUTION INTRAVENOUS; SUBCUTANEOUS
Status: DISCONTINUED | OUTPATIENT
Start: 2023-10-28 | End: 2023-10-29

## 2023-10-28 RX ADMIN — Medication 1000 UNITS: at 09:11

## 2023-10-28 RX ADMIN — INSULIN LISPRO 2 UNITS: 100 INJECTION, SOLUTION INTRAVENOUS; SUBCUTANEOUS at 18:09

## 2023-10-28 RX ADMIN — SODIUM CHLORIDE, POTASSIUM CHLORIDE, SODIUM LACTATE AND CALCIUM CHLORIDE: 600; 310; 30; 20 INJECTION, SOLUTION INTRAVENOUS at 00:58

## 2023-10-28 RX ADMIN — DEXAMETHASONE SODIUM PHOSPHATE 6 MG: 4 INJECTION, SOLUTION INTRA-ARTICULAR; INTRALESIONAL; INTRAMUSCULAR; INTRAVENOUS; SOFT TISSUE at 21:39

## 2023-10-28 RX ADMIN — OXYCODONE HYDROCHLORIDE AND ACETAMINOPHEN 500 MG: 500 TABLET ORAL at 09:12

## 2023-10-28 RX ADMIN — IPRATROPIUM BROMIDE AND ALBUTEROL SULFATE 1 DOSE: .5; 3 SOLUTION RESPIRATORY (INHALATION) at 16:12

## 2023-10-28 RX ADMIN — SODIUM CHLORIDE, PRESERVATIVE FREE 10 ML: 5 INJECTION INTRAVENOUS at 21:39

## 2023-10-28 RX ADMIN — ARFORMOTEROL TARTRATE 15 MCG: 15 SOLUTION RESPIRATORY (INHALATION) at 21:27

## 2023-10-28 RX ADMIN — ENOXAPARIN SODIUM 30 MG: 100 INJECTION SUBCUTANEOUS at 21:39

## 2023-10-28 RX ADMIN — ARFORMOTEROL TARTRATE 15 MCG: 15 SOLUTION RESPIRATORY (INHALATION) at 08:33

## 2023-10-28 RX ADMIN — ENOXAPARIN SODIUM 30 MG: 100 INJECTION SUBCUTANEOUS at 09:11

## 2023-10-28 RX ADMIN — SODIUM CHLORIDE, POTASSIUM CHLORIDE, SODIUM LACTATE AND CALCIUM CHLORIDE: 600; 310; 30; 20 INJECTION, SOLUTION INTRAVENOUS at 18:10

## 2023-10-28 RX ADMIN — INSULIN LISPRO 4 UNITS: 100 INJECTION, SOLUTION INTRAVENOUS; SUBCUTANEOUS at 12:04

## 2023-10-28 RX ADMIN — OXYCODONE HYDROCHLORIDE AND ACETAMINOPHEN 500 MG: 500 TABLET ORAL at 00:58

## 2023-10-28 RX ADMIN — IPRATROPIUM BROMIDE AND ALBUTEROL SULFATE 1 DOSE: .5; 3 SOLUTION RESPIRATORY (INHALATION) at 21:27

## 2023-10-28 RX ADMIN — IPRATROPIUM BROMIDE AND ALBUTEROL SULFATE 1 DOSE: .5; 3 SOLUTION RESPIRATORY (INHALATION) at 12:49

## 2023-10-28 RX ADMIN — ASPIRIN 81 MG: 81 TABLET, CHEWABLE ORAL at 09:12

## 2023-10-28 RX ADMIN — IPRATROPIUM BROMIDE AND ALBUTEROL SULFATE 1 DOSE: .5; 3 SOLUTION RESPIRATORY (INHALATION) at 08:41

## 2023-10-28 RX ADMIN — INSULIN LISPRO 1 UNITS: 100 INJECTION, SOLUTION INTRAVENOUS; SUBCUTANEOUS at 09:11

## 2023-10-28 RX ADMIN — SODIUM CHLORIDE, PRESERVATIVE FREE 10 ML: 5 INJECTION INTRAVENOUS at 09:13

## 2023-10-28 RX ADMIN — ROFLUMILAST 250 MCG: 500 TABLET ORAL at 09:11

## 2023-10-28 NOTE — PLAN OF CARE
Problem: Respiratory - Adult  Goal: Achieves optimal ventilation and oxygenation  Outcome: Progressing  Flowsheets (Taken 10/27/2023 2030)  Achieves optimal ventilation and oxygenation:   Assess for changes in respiratory status   Assess for changes in mentation and behavior   Position to facilitate oxygenation and minimize respiratory effort     Problem: Chronic Conditions and Co-morbidities  Goal: Patient's chronic conditions and co-morbidity symptoms are monitored and maintained or improved  Outcome: Progressing  Flowsheets (Taken 10/27/2023 2030)  Care Plan - Patient's Chronic Conditions and Co-Morbidity Symptoms are Monitored and Maintained or Improved:   Monitor and assess patient's chronic conditions and comorbid symptoms for stability, deterioration, or improvement   Collaborate with multidisciplinary team to address chronic and comorbid conditions and prevent exacerbation or deterioration     Problem: Discharge Planning  Goal: Discharge to home or other facility with appropriate resources  Outcome: Progressing  Flowsheets  Taken 10/27/2023 2057  Discharge to home or other facility with appropriate resources:   Identify barriers to discharge with patient and caregiver   Identify discharge learning needs (meds, wound care, etc)   Refer to discharge planning if patient needs post-hospital services based on physician order or complex needs related to functional status, cognitive ability or social support system   Arrange for needed discharge resources and transportation as appropriate   Arrange for interpreters to assist at discharge as needed  Taken 10/27/2023 2030  Discharge to home or other facility with appropriate resources:   Identify barriers to discharge with patient and caregiver   Arrange for needed discharge resources and transportation as appropriate   Identify discharge learning needs (meds, wound care, etc)     Problem: Safety - Adult  Goal: Free from fall injury  Outcome: Progressing

## 2023-10-29 LAB
ANION GAP SERPL CALC-SCNC: 10 MEQ/L (ref 8–16)
BACTERIA BLD AEROBE CULT: ABNORMAL
BUN SERPL-MCNC: 40 MG/DL (ref 7–22)
CALCIUM SERPL-MCNC: 9 MG/DL (ref 8.5–10.5)
CHLORIDE SERPL-SCNC: 96 MEQ/L (ref 98–111)
CO2 SERPL-SCNC: 35 MEQ/L (ref 23–33)
CREAT SERPL-MCNC: 0.9 MG/DL (ref 0.4–1.2)
DEPRECATED RDW RBC AUTO: 54.7 FL (ref 35–45)
ERYTHROCYTE [DISTWIDTH] IN BLOOD BY AUTOMATED COUNT: 14.8 % (ref 11.5–14.5)
GFR SERPL CREATININE-BSD FRML MDRD: > 60 ML/MIN/1.73M2
GLUCOSE BLD STRIP.AUTO-MCNC: 249 MG/DL (ref 70–108)
GLUCOSE BLD STRIP.AUTO-MCNC: 280 MG/DL (ref 70–108)
GLUCOSE BLD STRIP.AUTO-MCNC: 285 MG/DL (ref 70–108)
GLUCOSE SERPL-MCNC: 361 MG/DL (ref 70–108)
HCT VFR BLD AUTO: 33.8 % (ref 37–47)
HGB BLD-MCNC: 10.2 GM/DL (ref 12–16)
MCH RBC QN AUTO: 30.4 PG (ref 26–33)
MCHC RBC AUTO-ENTMCNC: 30.2 GM/DL (ref 32.2–35.5)
MCV RBC AUTO: 100.9 FL (ref 81–99)
ORGANISM: ABNORMAL
PLATELET # BLD AUTO: 158 THOU/MM3 (ref 130–400)
PMV BLD AUTO: 11.5 FL (ref 9.4–12.4)
POTASSIUM SERPL-SCNC: 4.8 MEQ/L (ref 3.5–5.2)
RBC # BLD AUTO: 3.35 MILL/MM3 (ref 4.2–5.4)
SODIUM SERPL-SCNC: 141 MEQ/L (ref 135–145)
WBC # BLD AUTO: 5.3 THOU/MM3 (ref 4.8–10.8)

## 2023-10-29 PROCEDURE — 82948 REAGENT STRIP/BLOOD GLUCOSE: CPT

## 2023-10-29 PROCEDURE — 6360000002 HC RX W HCPCS: Performed by: STUDENT IN AN ORGANIZED HEALTH CARE EDUCATION/TRAINING PROGRAM

## 2023-10-29 PROCEDURE — 99232 SBSQ HOSP IP/OBS MODERATE 35: CPT | Performed by: INTERNAL MEDICINE

## 2023-10-29 PROCEDURE — 2580000003 HC RX 258

## 2023-10-29 PROCEDURE — 6360000002 HC RX W HCPCS

## 2023-10-29 PROCEDURE — 85027 COMPLETE CBC AUTOMATED: CPT

## 2023-10-29 PROCEDURE — 94640 AIRWAY INHALATION TREATMENT: CPT

## 2023-10-29 PROCEDURE — 2500000003 HC RX 250 WO HCPCS: Performed by: INTERNAL MEDICINE

## 2023-10-29 PROCEDURE — 2060000000 HC ICU INTERMEDIATE R&B

## 2023-10-29 PROCEDURE — 80048 BASIC METABOLIC PNL TOTAL CA: CPT

## 2023-10-29 PROCEDURE — 6370000000 HC RX 637 (ALT 250 FOR IP)

## 2023-10-29 PROCEDURE — 94669 MECHANICAL CHEST WALL OSCILL: CPT

## 2023-10-29 PROCEDURE — 2700000000 HC OXYGEN THERAPY PER DAY

## 2023-10-29 PROCEDURE — 36415 COLL VENOUS BLD VENIPUNCTURE: CPT

## 2023-10-29 PROCEDURE — 94660 CPAP INITIATION&MGMT: CPT

## 2023-10-29 PROCEDURE — 94761 N-INVAS EAR/PLS OXIMETRY MLT: CPT

## 2023-10-29 PROCEDURE — 6370000000 HC RX 637 (ALT 250 FOR IP): Performed by: STUDENT IN AN ORGANIZED HEALTH CARE EDUCATION/TRAINING PROGRAM

## 2023-10-29 RX ORDER — INSULIN LISPRO 100 [IU]/ML
0-4 INJECTION, SOLUTION INTRAVENOUS; SUBCUTANEOUS NIGHTLY
Status: DISCONTINUED | OUTPATIENT
Start: 2023-10-29 | End: 2023-11-03 | Stop reason: HOSPADM

## 2023-10-29 RX ORDER — MIDODRINE HYDROCHLORIDE 5 MG/1
5 TABLET ORAL
Status: DISCONTINUED | OUTPATIENT
Start: 2023-10-29 | End: 2023-10-30

## 2023-10-29 RX ORDER — INSULIN LISPRO 100 [IU]/ML
0-16 INJECTION, SOLUTION INTRAVENOUS; SUBCUTANEOUS
Status: DISCONTINUED | OUTPATIENT
Start: 2023-10-29 | End: 2023-11-03 | Stop reason: HOSPADM

## 2023-10-29 RX ADMIN — ARFORMOTEROL TARTRATE 15 MCG: 15 SOLUTION RESPIRATORY (INHALATION) at 16:41

## 2023-10-29 RX ADMIN — Medication 1000 UNITS: at 08:10

## 2023-10-29 RX ADMIN — SODIUM CHLORIDE, PRESERVATIVE FREE 10 ML: 5 INJECTION INTRAVENOUS at 21:29

## 2023-10-29 RX ADMIN — PRAVASTATIN SODIUM 10 MG: 10 TABLET ORAL at 21:30

## 2023-10-29 RX ADMIN — ACETAMINOPHEN 650 MG: 325 TABLET ORAL at 15:59

## 2023-10-29 RX ADMIN — INSULIN LISPRO 4 UNITS: 100 INJECTION, SOLUTION INTRAVENOUS; SUBCUTANEOUS at 18:05

## 2023-10-29 RX ADMIN — ENOXAPARIN SODIUM 30 MG: 100 INJECTION SUBCUTANEOUS at 21:27

## 2023-10-29 RX ADMIN — ARFORMOTEROL TARTRATE 15 MCG: 15 SOLUTION RESPIRATORY (INHALATION) at 06:43

## 2023-10-29 RX ADMIN — INSULIN LISPRO 8 UNITS: 100 INJECTION, SOLUTION INTRAVENOUS; SUBCUTANEOUS at 08:09

## 2023-10-29 RX ADMIN — MICONAZOLE NITRATE: 2 POWDER TOPICAL at 13:14

## 2023-10-29 RX ADMIN — ROFLUMILAST 250 MCG: 500 TABLET ORAL at 08:10

## 2023-10-29 RX ADMIN — MIDODRINE HYDROCHLORIDE 5 MG: 5 TABLET ORAL at 21:27

## 2023-10-29 RX ADMIN — SODIUM CHLORIDE, PRESERVATIVE FREE 10 ML: 5 INJECTION INTRAVENOUS at 08:09

## 2023-10-29 RX ADMIN — IPRATROPIUM BROMIDE AND ALBUTEROL SULFATE 1 DOSE: .5; 3 SOLUTION RESPIRATORY (INHALATION) at 16:30

## 2023-10-29 RX ADMIN — SODIUM CHLORIDE, POTASSIUM CHLORIDE, SODIUM LACTATE AND CALCIUM CHLORIDE: 600; 310; 30; 20 INJECTION, SOLUTION INTRAVENOUS at 14:22

## 2023-10-29 RX ADMIN — IPRATROPIUM BROMIDE AND ALBUTEROL SULFATE 1 DOSE: .5; 3 SOLUTION RESPIRATORY (INHALATION) at 21:05

## 2023-10-29 RX ADMIN — INSULIN LISPRO 8 UNITS: 100 INJECTION, SOLUTION INTRAVENOUS; SUBCUTANEOUS at 13:11

## 2023-10-29 RX ADMIN — IPRATROPIUM BROMIDE AND ALBUTEROL SULFATE 1 DOSE: .5; 3 SOLUTION RESPIRATORY (INHALATION) at 12:22

## 2023-10-29 RX ADMIN — ACETAMINOPHEN 650 MG: 325 TABLET ORAL at 21:27

## 2023-10-29 RX ADMIN — ENOXAPARIN SODIUM 30 MG: 100 INJECTION SUBCUTANEOUS at 08:09

## 2023-10-29 RX ADMIN — OXYCODONE HYDROCHLORIDE AND ACETAMINOPHEN 500 MG: 500 TABLET ORAL at 08:10

## 2023-10-29 RX ADMIN — MICONAZOLE NITRATE: 2 POWDER TOPICAL at 21:29

## 2023-10-29 RX ADMIN — IPRATROPIUM BROMIDE AND ALBUTEROL SULFATE 1 DOSE: .5; 3 SOLUTION RESPIRATORY (INHALATION) at 06:36

## 2023-10-29 RX ADMIN — ASPIRIN 81 MG: 81 TABLET, CHEWABLE ORAL at 08:09

## 2023-10-29 RX ADMIN — DEXAMETHASONE SODIUM PHOSPHATE 6 MG: 4 INJECTION, SOLUTION INTRA-ARTICULAR; INTRALESIONAL; INTRAMUSCULAR; INTRAVENOUS; SOFT TISSUE at 21:27

## 2023-10-29 ASSESSMENT — PAIN SCALES - GENERAL
PAINLEVEL_OUTOF10: 0
PAINLEVEL_OUTOF10: 4
PAINLEVEL_OUTOF10: 5
PAINLEVEL_OUTOF10: 0
PAINLEVEL_OUTOF10: 4
PAINLEVEL_OUTOF10: 5

## 2023-10-29 ASSESSMENT — PAIN DESCRIPTION - ORIENTATION: ORIENTATION: POSTERIOR

## 2023-10-29 ASSESSMENT — PAIN DESCRIPTION - DESCRIPTORS
DESCRIPTORS: ACHING
DESCRIPTORS: ACHING

## 2023-10-29 ASSESSMENT — PAIN DESCRIPTION - LOCATION
LOCATION: HEAD
LOCATION: HEAD

## 2023-10-29 NOTE — PLAN OF CARE
Problem: Respiratory - Adult  Goal: Achieves optimal ventilation and oxygenation  10/29/2023 1339 by Fabi Kessler RN  Outcome: Progressing  10/29/2023 0638 by Jeffery Hoffman RCP  Outcome: Progressing  Goal: Clear lung sounds  10/29/2023 1339 by Fabi Kessler RN  Outcome: Progressing  10/29/2023 1228 by Mildred Ventura RCP  Outcome: Progressing  Note: Txs to help improve lung aeration. Patient mutually agreed on goals. 10/29/2023 8556 by Jeffery Hoffman RCP  Outcome: Progressing     Problem: Chronic Conditions and Co-morbidities  Goal: Patient's chronic conditions and co-morbidity symptoms are monitored and maintained or improved  Outcome: Progressing     Problem: Discharge Planning  Goal: Discharge to home or other facility with appropriate resources  Outcome: Progressing     Problem: Safety - Adult  Goal: Free from fall injury  Outcome: Progressing     Problem: Skin/Tissue Integrity  Goal: Absence of new skin breakdown  Description: 1. Monitor for areas of redness and/or skin breakdown  2. Assess vascular access sites hourly  3. Every 4-6 hours minimum:  Change oxygen saturation probe site  4. Every 4-6 hours:  If on nasal continuous positive airway pressure, respiratory therapy assess nares and determine need for appliance change or resting period. No signs of skin breakdown. Skin warm, dry, and intact. Mucous membranes pink and moist.  Assistance with turns/ambulation provided PRN. Will continue to monitor. Outcome: Progressing  Care plan reviewed with patient and family. Patient and family verbalize understanding of the plan of care and contribute to goal setting.

## 2023-10-29 NOTE — PLAN OF CARE
Problem: Respiratory - Adult  Goal: Achieves optimal ventilation and oxygenation  Outcome: Progressing   Wearing BiPAP HS and PRN to support breathing and oxygenation. Problem: Respiratory - Adult  Goal: Clear lung sounds  Outcome: Progressing    Patient lung sounds are considered normal for their current lung condition. No signs of distress noted. Current treatment regimen appropriate       Patient mutually agreed on goals.

## 2023-10-29 NOTE — PLAN OF CARE
Problem: Respiratory - Adult  Goal: Clear lung sounds  10/29/2023 1228 by Daksha Adam RCP  Outcome: Progressing  Note: Txs to help improve lung aeration. Patient mutually agreed on goals.

## 2023-10-30 ENCOUNTER — APPOINTMENT (OUTPATIENT)
Dept: CT IMAGING | Age: 68
DRG: 871 | End: 2023-10-30
Payer: COMMERCIAL

## 2023-10-30 PROBLEM — J12.82 PNEUMONIA DUE TO COVID-19 VIRUS: Status: ACTIVE | Noted: 2023-10-30

## 2023-10-30 PROBLEM — U07.1 PNEUMONIA DUE TO COVID-19 VIRUS: Status: ACTIVE | Noted: 2023-10-30

## 2023-10-30 LAB
ANION GAP SERPL CALC-SCNC: 8 MEQ/L (ref 8–16)
BUN SERPL-MCNC: 24 MG/DL (ref 7–22)
CALCIUM SERPL-MCNC: 9.1 MG/DL (ref 8.5–10.5)
CHLORIDE SERPL-SCNC: 95 MEQ/L (ref 98–111)
CO2 SERPL-SCNC: 36 MEQ/L (ref 23–33)
CREAT SERPL-MCNC: 0.7 MG/DL (ref 0.4–1.2)
DEPRECATED MEAN GLUCOSE BLD GHB EST-ACNC: 186 MG/DL (ref 70–126)
DEPRECATED RDW RBC AUTO: 57.1 FL (ref 35–45)
ERYTHROCYTE [DISTWIDTH] IN BLOOD BY AUTOMATED COUNT: 15.3 % (ref 11.5–14.5)
FERRITIN SERPL IA-MCNC: 134 NG/ML (ref 10–291)
GFR SERPL CREATININE-BSD FRML MDRD: > 60 ML/MIN/1.73M2
GLUCOSE BLD STRIP.AUTO-MCNC: 145 MG/DL (ref 70–108)
GLUCOSE BLD STRIP.AUTO-MCNC: 180 MG/DL (ref 70–108)
GLUCOSE BLD STRIP.AUTO-MCNC: 303 MG/DL (ref 70–108)
GLUCOSE BLD STRIP.AUTO-MCNC: 361 MG/DL (ref 70–108)
GLUCOSE SERPL-MCNC: 388 MG/DL (ref 70–108)
HBA1C MFR BLD HPLC: 8.2 % (ref 4.4–6.4)
HCT VFR BLD AUTO: 35.1 % (ref 37–47)
HGB BLD-MCNC: 10.6 GM/DL (ref 12–16)
LDH SERPL L TO P-CCNC: 132 U/L (ref 100–190)
MCH RBC QN AUTO: 30.5 PG (ref 26–33)
MCHC RBC AUTO-ENTMCNC: 30.2 GM/DL (ref 32.2–35.5)
MCV RBC AUTO: 101.2 FL (ref 81–99)
PLATELET # BLD AUTO: 156 THOU/MM3 (ref 130–400)
PMV BLD AUTO: 11.4 FL (ref 9.4–12.4)
POTASSIUM SERPL-SCNC: 4.9 MEQ/L (ref 3.5–5.2)
RBC # BLD AUTO: 3.47 MILL/MM3 (ref 4.2–5.4)
SODIUM SERPL-SCNC: 139 MEQ/L (ref 135–145)
WBC # BLD AUTO: 4.1 THOU/MM3 (ref 4.8–10.8)

## 2023-10-30 PROCEDURE — 94660 CPAP INITIATION&MGMT: CPT

## 2023-10-30 PROCEDURE — 6370000000 HC RX 637 (ALT 250 FOR IP)

## 2023-10-30 PROCEDURE — 6360000002 HC RX W HCPCS: Performed by: NURSE PRACTITIONER

## 2023-10-30 PROCEDURE — 6360000002 HC RX W HCPCS: Performed by: INTERNAL MEDICINE

## 2023-10-30 PROCEDURE — 71250 CT THORAX DX C-: CPT

## 2023-10-30 PROCEDURE — 2580000003 HC RX 258: Performed by: INTERNAL MEDICINE

## 2023-10-30 PROCEDURE — 83036 HEMOGLOBIN GLYCOSYLATED A1C: CPT

## 2023-10-30 PROCEDURE — 82948 REAGENT STRIP/BLOOD GLUCOSE: CPT

## 2023-10-30 PROCEDURE — 36415 COLL VENOUS BLD VENIPUNCTURE: CPT

## 2023-10-30 PROCEDURE — 6360000002 HC RX W HCPCS

## 2023-10-30 PROCEDURE — 87077 CULTURE AEROBIC IDENTIFY: CPT

## 2023-10-30 PROCEDURE — 82728 ASSAY OF FERRITIN: CPT

## 2023-10-30 PROCEDURE — 87205 SMEAR GRAM STAIN: CPT

## 2023-10-30 PROCEDURE — 99233 SBSQ HOSP IP/OBS HIGH 50: CPT | Performed by: INTERNAL MEDICINE

## 2023-10-30 PROCEDURE — 36600 WITHDRAWAL OF ARTERIAL BLOOD: CPT

## 2023-10-30 PROCEDURE — 6370000000 HC RX 637 (ALT 250 FOR IP): Performed by: INTERNAL MEDICINE

## 2023-10-30 PROCEDURE — 87040 BLOOD CULTURE FOR BACTERIA: CPT

## 2023-10-30 PROCEDURE — 85027 COMPLETE CBC AUTOMATED: CPT

## 2023-10-30 PROCEDURE — 87070 CULTURE OTHR SPECIMN AEROBIC: CPT

## 2023-10-30 PROCEDURE — 2060000000 HC ICU INTERMEDIATE R&B

## 2023-10-30 PROCEDURE — XW0DXM6 INTRODUCTION OF BARICITINIB INTO MOUTH AND PHARYNX, EXTERNAL APPROACH, NEW TECHNOLOGY GROUP 6: ICD-10-PCS | Performed by: INTERNAL MEDICINE

## 2023-10-30 PROCEDURE — 83615 LACTATE (LD) (LDH) ENZYME: CPT

## 2023-10-30 PROCEDURE — 2100000000 HC CCU R&B

## 2023-10-30 PROCEDURE — 1200000003 HC TELEMETRY R&B

## 2023-10-30 PROCEDURE — 94669 MECHANICAL CHEST WALL OSCILL: CPT

## 2023-10-30 PROCEDURE — 94640 AIRWAY INHALATION TREATMENT: CPT

## 2023-10-30 PROCEDURE — 6370000000 HC RX 637 (ALT 250 FOR IP): Performed by: NURSE PRACTITIONER

## 2023-10-30 PROCEDURE — 2580000003 HC RX 258

## 2023-10-30 PROCEDURE — 99232 SBSQ HOSP IP/OBS MODERATE 35: CPT | Performed by: INTERNAL MEDICINE

## 2023-10-30 PROCEDURE — 2700000000 HC OXYGEN THERAPY PER DAY

## 2023-10-30 PROCEDURE — 87147 CULTURE TYPE IMMUNOLOGIC: CPT

## 2023-10-30 PROCEDURE — 94761 N-INVAS EAR/PLS OXIMETRY MLT: CPT

## 2023-10-30 PROCEDURE — 80048 BASIC METABOLIC PNL TOTAL CA: CPT

## 2023-10-30 RX ORDER — IPRATROPIUM BROMIDE AND ALBUTEROL SULFATE 2.5; .5 MG/3ML; MG/3ML
1 SOLUTION RESPIRATORY (INHALATION)
Status: DISCONTINUED | OUTPATIENT
Start: 2023-10-30 | End: 2023-11-03 | Stop reason: HOSPADM

## 2023-10-30 RX ORDER — DEXAMETHASONE SODIUM PHOSPHATE 4 MG/ML
6 INJECTION, SOLUTION INTRA-ARTICULAR; INTRALESIONAL; INTRAMUSCULAR; INTRAVENOUS; SOFT TISSUE EVERY 24 HOURS
Status: DISCONTINUED | OUTPATIENT
Start: 2023-10-30 | End: 2023-11-03

## 2023-10-30 RX ORDER — INSULIN GLARGINE 100 [IU]/ML
25 INJECTION, SOLUTION SUBCUTANEOUS NIGHTLY
Status: DISCONTINUED | OUTPATIENT
Start: 2023-10-30 | End: 2023-10-31

## 2023-10-30 RX ADMIN — AZITHROMYCIN MONOHYDRATE 500 MG: 500 INJECTION, POWDER, LYOPHILIZED, FOR SOLUTION INTRAVENOUS at 13:30

## 2023-10-30 RX ADMIN — SODIUM CHLORIDE, POTASSIUM CHLORIDE, SODIUM LACTATE AND CALCIUM CHLORIDE: 600; 310; 30; 20 INJECTION, SOLUTION INTRAVENOUS at 09:52

## 2023-10-30 RX ADMIN — INSULIN LISPRO 15 UNITS: 100 INJECTION, SOLUTION INTRAVENOUS; SUBCUTANEOUS at 13:39

## 2023-10-30 RX ADMIN — MICONAZOLE NITRATE: 2 POWDER TOPICAL at 13:30

## 2023-10-30 RX ADMIN — OXYCODONE HYDROCHLORIDE AND ACETAMINOPHEN 500 MG: 500 TABLET ORAL at 10:58

## 2023-10-30 RX ADMIN — IPRATROPIUM BROMIDE AND ALBUTEROL SULFATE 1 DOSE: .5; 3 SOLUTION RESPIRATORY (INHALATION) at 21:41

## 2023-10-30 RX ADMIN — INSULIN GLARGINE 25 UNITS: 100 INJECTION, SOLUTION SUBCUTANEOUS at 20:44

## 2023-10-30 RX ADMIN — PRAVASTATIN SODIUM 10 MG: 10 TABLET ORAL at 20:45

## 2023-10-30 RX ADMIN — Medication 1000 UNITS: at 10:58

## 2023-10-30 RX ADMIN — ENOXAPARIN SODIUM 30 MG: 100 INJECTION SUBCUTANEOUS at 20:45

## 2023-10-30 RX ADMIN — IPRATROPIUM BROMIDE AND ALBUTEROL SULFATE 1 DOSE: .5; 3 SOLUTION RESPIRATORY (INHALATION) at 16:39

## 2023-10-30 RX ADMIN — BARICITINIB 4 MG: 2 TABLET, FILM COATED ORAL at 10:58

## 2023-10-30 RX ADMIN — METOPROLOL TARTRATE 25 MG: 25 TABLET, FILM COATED ORAL at 10:58

## 2023-10-30 RX ADMIN — ASPIRIN 81 MG: 81 TABLET, CHEWABLE ORAL at 13:26

## 2023-10-30 RX ADMIN — ROFLUMILAST 250 MCG: 500 TABLET ORAL at 10:58

## 2023-10-30 RX ADMIN — IPRATROPIUM BROMIDE AND ALBUTEROL SULFATE 1 DOSE: .5; 3 SOLUTION RESPIRATORY (INHALATION) at 12:49

## 2023-10-30 RX ADMIN — DEXAMETHASONE SODIUM PHOSPHATE 6 MG: 4 INJECTION, SOLUTION INTRA-ARTICULAR; INTRALESIONAL; INTRAMUSCULAR; INTRAVENOUS; SOFT TISSUE at 21:02

## 2023-10-30 RX ADMIN — INSULIN LISPRO 16 UNITS: 100 INJECTION, SOLUTION INTRAVENOUS; SUBCUTANEOUS at 08:15

## 2023-10-30 RX ADMIN — ARFORMOTEROL TARTRATE 15 MCG: 15 SOLUTION RESPIRATORY (INHALATION) at 08:33

## 2023-10-30 RX ADMIN — MICONAZOLE NITRATE: 2 POWDER TOPICAL at 20:45

## 2023-10-30 RX ADMIN — IPRATROPIUM BROMIDE AND ALBUTEROL SULFATE 1 DOSE: .5; 3 SOLUTION RESPIRATORY (INHALATION) at 08:33

## 2023-10-30 RX ADMIN — SODIUM CHLORIDE, PRESERVATIVE FREE 10 ML: 5 INJECTION INTRAVENOUS at 21:02

## 2023-10-30 RX ADMIN — METOPROLOL TARTRATE 25 MG: 25 TABLET, FILM COATED ORAL at 20:47

## 2023-10-30 RX ADMIN — ENOXAPARIN SODIUM 30 MG: 100 INJECTION SUBCUTANEOUS at 13:26

## 2023-10-30 RX ADMIN — CEFTRIAXONE SODIUM 1000 MG: 1 INJECTION, POWDER, FOR SOLUTION INTRAMUSCULAR; INTRAVENOUS at 11:00

## 2023-10-30 ASSESSMENT — PAIN SCALES - GENERAL: PAINLEVEL_OUTOF10: 0

## 2023-10-30 NOTE — PLAN OF CARE
Problem: Respiratory - Adult  Goal: Achieves optimal ventilation and oxygenation  Outcome: Progressing  Flowsheets (Taken 10/27/2023 2030 by Nicolas Pierce RN)  Achieves optimal ventilation and oxygenation:   Assess for changes in respiratory status   Assess for changes in mentation and behavior   Position to facilitate oxygenation and minimize respiratory effort  Goal: Clear lung sounds  Outcome: Progressing     Problem: Chronic Conditions and Co-morbidities  Goal: Patient's chronic conditions and co-morbidity symptoms are monitored and maintained or improved  Outcome: Progressing  Flowsheets (Taken 10/27/2023 2030 by Nicolas Pierce RN)  Care Plan - Patient's Chronic Conditions and Co-Morbidity Symptoms are Monitored and Maintained or Improved:   Monitor and assess patient's chronic conditions and comorbid symptoms for stability, deterioration, or improvement   Collaborate with multidisciplinary team to address chronic and comorbid conditions and prevent exacerbation or deterioration     Problem: Discharge Planning  Goal: Discharge to home or other facility with appropriate resources  Outcome: Progressing  Flowsheets (Taken 10/27/2023 2057 by Nicolas Pierce RN)  Discharge to home or other facility with appropriate resources:   Identify barriers to discharge with patient and caregiver   Identify discharge learning needs (meds, wound care, etc)   Refer to discharge planning if patient needs post-hospital services based on physician order or complex needs related to functional status, cognitive ability or social support system   Arrange for needed discharge resources and transportation as appropriate   Arrange for interpreters to assist at discharge as needed     Problem: Safety - Adult  Goal: Free from fall injury  Outcome: Progressing  Flowsheets (Taken 10/30/2023 0410)  Free From Fall Injury: Instruct family/caregiver on patient safety     Problem: Skin/Tissue Integrity  Goal: Absence of new skin

## 2023-10-30 NOTE — PLAN OF CARE
Problem: Respiratory - Adult  Goal: Achieves optimal ventilation and oxygenation  10/30/2023 1703 by Le Richards RN  Flowsheets (Taken 10/27/2023 2030 by Sagar Mccloud RN)  Achieves optimal ventilation and oxygenation:   Assess for changes in respiratory status   Assess for changes in mentation and behavior   Position to facilitate oxygenation and minimize respiratory effort  Note: Has intermittent expiratory wheezes that clear with neb treatments. O2 sat is hovering around 90% and does have TORRES. 10/30/2023 0410 by Brandon Espinoza RN  Outcome: Progressing  Flowsheets (Taken 10/27/2023 2030 by Sagar Mccloud RN)  Achieves optimal ventilation and oxygenation:   Assess for changes in respiratory status   Assess for changes in mentation and behavior   Position to facilitate oxygenation and minimize respiratory effort   Nursing care plan of care reviewed with Donnawith agreement and verbalization of active participation and goal setting.

## 2023-10-30 NOTE — CARE COORDINATION
Case Management Assessment  Initial Evaluation    Date/Time of Evaluation: 10/30/2023 2:18 PM  Assessment Completed by: Huey Parikh RN    If patient is discharged prior to next notation, then this note serves as note for discharge by case management. Patient Name: Niurka Mercedes                   YOB: 1955  Diagnosis: COPD exacerbation ([address]) [J44.1]  Sepsis ([address]) [A41.9]  Acute on chronic respiratory failure with hypoxia and hypercapnia ([address]) [N63.78, J96.22]  COVID-19 virus infection [U07.1]                   Date / Time: 10/27/2023 12:09 PM  Location: [address] [place] [address]     Patient Admission Status: Inpatient   Readmission Risk Low 0-14, Mod 15-19), High > 20: Readmission Risk Score: 12.4    Current PCP: Herve Rogers  PCP verified by CM? Yes    Chart Reviewed: Yes      History Provided by: Patient  Patient Orientation: Alert and Oriented    Patient Cognition: Alert    Hospitalization in the last 30 days (Readmission):  No    If yes, Readmission Assessment in CM Navigator will be completed.     Advance Directives:      Code Status: Full Code   Patient's Primary Decision Maker is: Legal Next of Kin (has paperwork at home)    Primary Decision Maker: Alvin Ulloa - Spouse - [phone]    Secondary Decision Maker: Laila Worthy - Child - [phone]    Secondary Decision Maker: Ike Granados Child - [phone]    Discharge Planning:    Patient lives with: Spouse/Significant Other, Children ( and son) Type of Home: House  Primary Care Giver: Self  Patient Support Systems include: Spouse/Significant Other, Children, Family Members   Current Financial resources: Medicare, Other (Comment) (BCBS primary)  Current community resources: None  Current services prior to admission: Oxygen Therapy, Durable Medical Equipment            Current DME: Oxygen Therapy (Comment), Home Aerosol, Cpap, Shower Chair, Walker ([facility], O2 and CPAP; O2 4L ATC; Rollator)            Type of Home Care services:

## 2023-10-30 NOTE — CONSULTS
30.2*    172 158   MPV 11.1 11.1 11.5      BMP  Recent Labs     10/27/23  1849 10/28/23  0818 10/29/23  0503    139 141   K 5.3* 5.1 4.8   CL 94* 95* 96*   CO2 37* 33 35*   BUN 29* 40* 40*   CREATININE 1.0 1.0 0.9   GLUCOSE 224* 245* 361*   CALCIUM 9.8 9.3 9.0     LFT  Recent Labs     10/27/23  1215   AST 25   ALT 24   BILITOT 0.2*   ALKPHOS 62     TROP  No results found for: \"TROPONINT\"  BNP  Lab Results   Component Value Date/Time    PROBNP 97.6 10/27/2023 03:45 PM     D-Dimer  No results found for: \"DDIMER\"  Lactic Acid  No results for input(s): \"LACTA\" in the last 72 hours. INR  No results for input(s): \"INR\", \"PROTIME\" in the last 72 hours. PTT  No results for input(s): \"APTT\" in the last 72 hours. Glucose  Recent Labs     10/29/23  1256 10/29/23  1642 10/29/23  2002   POCGLU 280* 249* 285*     UA   Recent Labs     10/28/23  1810   PHUR 5.0   COLORU YELLOW   PROTEINU TRACE*   BLOODU NEGATIVE   RBCUA 5-10   WBCUA 5-9   BACTERIA NONE SEEN   NITRU NEGATIVE   GLUCOSEU NEGATIVE   BILIRUBINUR NEGATIVE   UROBILINOGEN 0.2   KETUA 15*     PFTs   No records   Echo    See report  Cultures    Procalcitonin  Lab Results   Component Value Date/Time    PROCAL 0.10 10/27/2023 12:15 PM    PROCAL 0.09 07/18/2021 05:08 AM        Radiology    CXR      CT Scans 07/2023        Assessment   Acute on chronic respiratory failure with hypoxia and Hypercapnia  COPD stage 4 with acute exacerbation  COVID 19 pneumonia  Diastolic CHF  HTN  DM2  Likely DENISHA   Recommendations     Continue Decadron 6 mg IV daily started 10/28  Wean low flow nasal canula as tolerated   Agree with holding diuretics, CXR likely secondary to COVID 19/mucus plugging, will check CT chest and decide on antibiotics based on infiltrates   Continue Duoneb Q6 hours  Rofumilast 250 mcg daily  DVT prophylaxis with Lovenox    Thank you for the consult and allowing us to participate in the care of your patient.      Case discussed with nurse and

## 2023-10-31 LAB
ANION GAP SERPL CALC-SCNC: 5 MEQ/L (ref 8–16)
BASE EXCESS BLDA CALC-SCNC: 11.8 MMOL/L (ref -2–3)
BUN SERPL-MCNC: 20 MG/DL (ref 7–22)
CALCIUM SERPL-MCNC: 8.6 MG/DL (ref 8.5–10.5)
CHLORIDE SERPL-SCNC: 96 MEQ/L (ref 98–111)
CO2 SERPL-SCNC: 38 MEQ/L (ref 23–33)
COLLECTED BY:: ABNORMAL
CREAT SERPL-MCNC: 0.7 MG/DL (ref 0.4–1.2)
DEPRECATED RDW RBC AUTO: 57.7 FL (ref 35–45)
DEVICE: ABNORMAL
ERYTHROCYTE [DISTWIDTH] IN BLOOD BY AUTOMATED COUNT: 15.5 % (ref 11.5–14.5)
FIO2 ON VENT O2 ANALYZER: 50 %
GFR SERPL CREATININE-BSD FRML MDRD: > 60 ML/MIN/1.73M2
GLUCOSE BLD STRIP.AUTO-MCNC: 110 MG/DL (ref 70–108)
GLUCOSE BLD STRIP.AUTO-MCNC: 135 MG/DL (ref 70–108)
GLUCOSE BLD STRIP.AUTO-MCNC: 258 MG/DL (ref 70–108)
GLUCOSE SERPL-MCNC: 346 MG/DL (ref 70–108)
HCO3 BLDA-SCNC: 38 MMOL/L (ref 23–28)
HCT VFR BLD AUTO: 34.2 % (ref 37–47)
HGB BLD-MCNC: 10.3 GM/DL (ref 12–16)
MCH RBC QN AUTO: 30.4 PG (ref 26–33)
MCHC RBC AUTO-ENTMCNC: 30.1 GM/DL (ref 32.2–35.5)
MCV RBC AUTO: 100.9 FL (ref 81–99)
PCO2 TEMP ADJ BLDMV: 58 MMHG (ref 41–51)
PH BLDMV: 7.43 [PH] (ref 7.31–7.41)
PIP: 24 CMH2O
PLATELET # BLD AUTO: 152 THOU/MM3 (ref 130–400)
PMV BLD AUTO: 11.3 FL (ref 9.4–12.4)
PO2 BLDMV: 39 MMHG (ref 25–40)
POTASSIUM SERPL-SCNC: 5 MEQ/L (ref 3.5–5.2)
RBC # BLD AUTO: 3.39 MILL/MM3 (ref 4.2–5.4)
SAO2 % BLDMV: 72 %
SET PEEP: 8 MMHG
SET RESPIRATORY RATE: 12 BPM
SITE: ABNORMAL
SODIUM SERPL-SCNC: 139 MEQ/L (ref 135–145)
VENTILATION MODE VENT: ABNORMAL
WBC # BLD AUTO: 3.6 THOU/MM3 (ref 4.8–10.8)

## 2023-10-31 PROCEDURE — 82948 REAGENT STRIP/BLOOD GLUCOSE: CPT

## 2023-10-31 PROCEDURE — 99232 SBSQ HOSP IP/OBS MODERATE 35: CPT | Performed by: INTERNAL MEDICINE

## 2023-10-31 PROCEDURE — 94660 CPAP INITIATION&MGMT: CPT

## 2023-10-31 PROCEDURE — 51798 US URINE CAPACITY MEASURE: CPT

## 2023-10-31 PROCEDURE — 6360000002 HC RX W HCPCS

## 2023-10-31 PROCEDURE — 6360000002 HC RX W HCPCS: Performed by: INTERNAL MEDICINE

## 2023-10-31 PROCEDURE — 94669 MECHANICAL CHEST WALL OSCILL: CPT

## 2023-10-31 PROCEDURE — 2580000003 HC RX 258: Performed by: INTERNAL MEDICINE

## 2023-10-31 PROCEDURE — 6370000000 HC RX 637 (ALT 250 FOR IP): Performed by: INTERNAL MEDICINE

## 2023-10-31 PROCEDURE — 6360000002 HC RX W HCPCS: Performed by: NURSE PRACTITIONER

## 2023-10-31 PROCEDURE — 36415 COLL VENOUS BLD VENIPUNCTURE: CPT

## 2023-10-31 PROCEDURE — 6370000000 HC RX 637 (ALT 250 FOR IP)

## 2023-10-31 PROCEDURE — 2060000000 HC ICU INTERMEDIATE R&B

## 2023-10-31 PROCEDURE — 82803 BLOOD GASES ANY COMBINATION: CPT

## 2023-10-31 PROCEDURE — 94640 AIRWAY INHALATION TREATMENT: CPT

## 2023-10-31 PROCEDURE — 85027 COMPLETE CBC AUTOMATED: CPT

## 2023-10-31 PROCEDURE — 93306 TTE W/DOPPLER COMPLETE: CPT

## 2023-10-31 PROCEDURE — 2700000000 HC OXYGEN THERAPY PER DAY

## 2023-10-31 PROCEDURE — 1200000003 HC TELEMETRY R&B

## 2023-10-31 PROCEDURE — 94761 N-INVAS EAR/PLS OXIMETRY MLT: CPT

## 2023-10-31 PROCEDURE — 80048 BASIC METABOLIC PNL TOTAL CA: CPT

## 2023-10-31 PROCEDURE — 6370000000 HC RX 637 (ALT 250 FOR IP): Performed by: NURSE PRACTITIONER

## 2023-10-31 PROCEDURE — 2580000003 HC RX 258

## 2023-10-31 PROCEDURE — 36600 WITHDRAWAL OF ARTERIAL BLOOD: CPT

## 2023-10-31 RX ORDER — AMLODIPINE BESYLATE 5 MG/1
5 TABLET ORAL DAILY
Status: DISCONTINUED | OUTPATIENT
Start: 2023-10-31 | End: 2023-11-03 | Stop reason: HOSPADM

## 2023-10-31 RX ORDER — INSULIN GLARGINE 100 [IU]/ML
30 INJECTION, SOLUTION SUBCUTANEOUS NIGHTLY
Status: DISCONTINUED | OUTPATIENT
Start: 2023-10-31 | End: 2023-11-01

## 2023-10-31 RX ORDER — INSULIN LISPRO 100 [IU]/ML
10 INJECTION, SOLUTION INTRAVENOUS; SUBCUTANEOUS
Status: DISCONTINUED | OUTPATIENT
Start: 2023-10-31 | End: 2023-11-01

## 2023-10-31 RX ADMIN — ROFLUMILAST 250 MCG: 500 TABLET ORAL at 09:59

## 2023-10-31 RX ADMIN — IPRATROPIUM BROMIDE AND ALBUTEROL SULFATE 1 DOSE: .5; 3 SOLUTION RESPIRATORY (INHALATION) at 17:22

## 2023-10-31 RX ADMIN — MICONAZOLE NITRATE: 2 POWDER TOPICAL at 09:56

## 2023-10-31 RX ADMIN — IPRATROPIUM BROMIDE AND ALBUTEROL SULFATE 1 DOSE: .5; 3 SOLUTION RESPIRATORY (INHALATION) at 14:02

## 2023-10-31 RX ADMIN — MICONAZOLE NITRATE: 2 POWDER TOPICAL at 21:13

## 2023-10-31 RX ADMIN — ENOXAPARIN SODIUM 30 MG: 100 INJECTION SUBCUTANEOUS at 13:25

## 2023-10-31 RX ADMIN — INSULIN GLARGINE 30 UNITS: 100 INJECTION, SOLUTION SUBCUTANEOUS at 21:01

## 2023-10-31 RX ADMIN — PRAVASTATIN SODIUM 10 MG: 10 TABLET ORAL at 21:12

## 2023-10-31 RX ADMIN — METOPROLOL TARTRATE 25 MG: 25 TABLET, FILM COATED ORAL at 10:00

## 2023-10-31 RX ADMIN — Medication 1000 UNITS: at 10:00

## 2023-10-31 RX ADMIN — SODIUM CHLORIDE, PRESERVATIVE FREE 10 ML: 5 INJECTION INTRAVENOUS at 21:10

## 2023-10-31 RX ADMIN — ENOXAPARIN SODIUM 30 MG: 100 INJECTION SUBCUTANEOUS at 21:03

## 2023-10-31 RX ADMIN — IPRATROPIUM BROMIDE AND ALBUTEROL SULFATE 1 DOSE: .5; 3 SOLUTION RESPIRATORY (INHALATION) at 10:34

## 2023-10-31 RX ADMIN — BARICITINIB 4 MG: 2 TABLET, FILM COATED ORAL at 13:25

## 2023-10-31 RX ADMIN — AZITHROMYCIN MONOHYDRATE 500 MG: 500 INJECTION, POWDER, LYOPHILIZED, FOR SOLUTION INTRAVENOUS at 13:23

## 2023-10-31 RX ADMIN — INSULIN LISPRO 10 UNITS: 100 INJECTION, SOLUTION INTRAVENOUS; SUBCUTANEOUS at 13:19

## 2023-10-31 RX ADMIN — ACETAMINOPHEN 650 MG: 325 TABLET ORAL at 21:04

## 2023-10-31 RX ADMIN — ASPIRIN 81 MG: 81 TABLET, CHEWABLE ORAL at 13:25

## 2023-10-31 RX ADMIN — METOPROLOL TARTRATE 50 MG: 50 TABLET, FILM COATED ORAL at 21:12

## 2023-10-31 RX ADMIN — IPRATROPIUM BROMIDE AND ALBUTEROL SULFATE 1 DOSE: .5; 3 SOLUTION RESPIRATORY (INHALATION) at 21:36

## 2023-10-31 RX ADMIN — DEXAMETHASONE SODIUM PHOSPHATE 6 MG: 4 INJECTION, SOLUTION INTRA-ARTICULAR; INTRALESIONAL; INTRAMUSCULAR; INTRAVENOUS; SOFT TISSUE at 21:03

## 2023-10-31 RX ADMIN — CEFTRIAXONE SODIUM 1000 MG: 1 INJECTION, POWDER, FOR SOLUTION INTRAMUSCULAR; INTRAVENOUS at 09:49

## 2023-10-31 RX ADMIN — OXYCODONE HYDROCHLORIDE AND ACETAMINOPHEN 500 MG: 500 TABLET ORAL at 09:59

## 2023-10-31 RX ADMIN — AMLODIPINE BESYLATE 5 MG: 5 TABLET ORAL at 13:25

## 2023-10-31 RX ADMIN — SODIUM CHLORIDE, PRESERVATIVE FREE 10 ML: 5 INJECTION INTRAVENOUS at 09:59

## 2023-10-31 RX ADMIN — INSULIN LISPRO 10 UNITS: 100 INJECTION, SOLUTION INTRAVENOUS; SUBCUTANEOUS at 13:18

## 2023-10-31 ASSESSMENT — PAIN SCALES - GENERAL
PAINLEVEL_OUTOF10: 5
PAINLEVEL_OUTOF10: 2
PAINLEVEL_OUTOF10: 0

## 2023-10-31 ASSESSMENT — PAIN DESCRIPTION - LOCATION: LOCATION: HEAD

## 2023-10-31 ASSESSMENT — PAIN DESCRIPTION - DESCRIPTORS: DESCRIPTORS: ACHING

## 2023-10-31 NOTE — PLAN OF CARE
Problem: Respiratory - Adult  Goal: Achieves optimal ventilation and oxygenation  10/30/2023 2144 by Scooter Evans RCP  Outcome: Progressing     Problem: Respiratory - Adult  Goal: Clear lung sounds  Outcome: Progressing   Patient mutually agrees upon goals. Continue with breathing tx's as directed to improve breath sounds, increase aeration, and decrease work of breathing.

## 2023-10-31 NOTE — PLAN OF CARE
Problem: Respiratory - Adult  Goal: Clear lung sounds  10/31/2023 1040 by Jessica Bess RCP  Outcome: Progressing  Note: Will continue with treatments   Pt had no questions on purpose or side effects of medication  Pt mutually agreed upon goals       Problem: Respiratory - Adult  Goal: Achieves optimal ventilation and oxygenation  10/31/2023 1040 by Jessica Bess RCP  Outcome: Progressing

## 2023-10-31 NOTE — PLAN OF CARE
Problem: Respiratory - Adult  Goal: Achieves optimal ventilation and oxygenation  10/30/2023 2249 by Current, Rosey Schlatter, RN  Outcome: Progressing  Flowsheets (Taken 10/27/2023 2030 by Lynette West RN)  Achieves optimal ventilation and oxygenation:   Assess for changes in respiratory status   Assess for changes in mentation and behavior   Position to facilitate oxygenation and minimize respiratory effort  10/30/2023 2144 by Lashae Gilman RCP  Outcome: Progressing  10/30/2023 1703 by Chico Mcgovern RN  [address] (Taken 10/27/2023 2030 by Lynette West RN)  Achieves optimal ventilation and oxygenation:   Assess for changes in respiratory status   Assess for changes in mentation and behavior   Position to facilitate oxygenation and minimize respiratory effort  Note: Has intermittent expiratory wheezes that clear with neb treatments. O2 sat is hovering around 90% and does have TORRES. Goal: Clear lung sounds  10/30/2023 2249 by Current, Rosey Schlatter, RN  Outcome: Progressing  10/30/2023 2144 by Lashae Gilman RCP  Outcome: Progressing     Problem: Chronic Conditions and Co-morbidities  Goal: Patient's chronic conditions and co-morbidity symptoms are monitored and maintained or improved  10/30/2023 2249 by Current, Rosey Schlatter, RN  Outcome: Progressing  Flowsheets (Taken 10/30/2023 0728 by Chico Mcgovern RN)  Care Plan - Patient's Chronic Conditions and Co-Morbidity Symptoms are Monitored and Maintained or Improved: Monitor and assess patient's chronic conditions and comorbid symptoms for stability, deterioration, or improvement  10/30/2023 1704 by Chico Mcgovern RN  Flowsheets (Taken 10/30/2023 8050)  Care Plan - Patient's Chronic Conditions and Co-Morbidity Symptoms are Monitored and Maintained or Improved: Monitor and assess patient's chronic conditions and comorbid symptoms for stability, deterioration, or improvement  Note: Blood sugars are high with added steroids.  Monitoring blood sugars and treating with sliding

## 2023-10-31 NOTE — CARE COORDINATION
10/31/23, 9:04 AM EDT    DISCHARGE ON GOING [address] [facility] day: 4  Location: -01/001-A Reason for admit: COPD exacerbation ([address]) [J44.1]  Sepsis ([address] [facility]) [A41.9]  Acute on chronic respiratory failure with hypoxia and hypercapnia ([address] [facility]) [Y90.86, J96.22]  COVID-19 virus infection [U07.1]   Procedure:   10/27 CXR: Possible small left-sided pleural effusion. Mild stable cardiomegaly. 10/30 CT chest: Left basilar pneumonia. 10/31 ECHO: ordered. Barriers to Discharge: Hospitalist and Pulmonology following. COVID+. Tmax 100.3F. Continuous BiPAP. Zithromax iv daily. Baricitinib 4mg daily. Rocephin iv daily. Decadron 6mg iv daily. Lovenox. DuoNeb q4hr [address] [facility]. PCP: Luda Antonio  Readmission Risk Score: 14.5%  Patient Goals/Plan/Treatment Preferences: From home with  and son. Has home O2 and CPAP from [facility] (4L ATC baseline). Monitor for needs.

## 2023-11-01 LAB
ANION GAP SERPL CALC-SCNC: 4 MEQ/L (ref 8–16)
BACTERIA BLD AEROBE CULT: NORMAL
BUN SERPL-MCNC: 22 MG/DL (ref 7–22)
CALCIUM SERPL-MCNC: 8.9 MG/DL (ref 8.5–10.5)
CHLORIDE SERPL-SCNC: 95 MEQ/L (ref 98–111)
CO2 SERPL-SCNC: 40 MEQ/L (ref 23–33)
CREAT SERPL-MCNC: 0.7 MG/DL (ref 0.4–1.2)
DEPRECATED RDW RBC AUTO: 58.6 FL (ref 35–45)
ERYTHROCYTE [DISTWIDTH] IN BLOOD BY AUTOMATED COUNT: 15 % (ref 11.5–14.5)
GFR SERPL CREATININE-BSD FRML MDRD: > 60 ML/MIN/1.73M2
GLUCOSE BLD STRIP.AUTO-MCNC: 127 MG/DL (ref 70–108)
GLUCOSE BLD STRIP.AUTO-MCNC: 147 MG/DL (ref 70–108)
GLUCOSE BLD STRIP.AUTO-MCNC: 178 MG/DL (ref 70–108)
GLUCOSE BLD STRIP.AUTO-MCNC: 276 MG/DL (ref 70–108)
GLUCOSE SERPL-MCNC: 337 MG/DL (ref 70–108)
HCT VFR BLD AUTO: 38.1 % (ref 37–47)
HGB BLD-MCNC: 11.3 GM/DL (ref 12–16)
MCH RBC QN AUTO: 30.9 PG (ref 26–33)
MCHC RBC AUTO-ENTMCNC: 29.7 GM/DL (ref 32.2–35.5)
MCV RBC AUTO: 104.1 FL (ref 81–99)
PLATELET # BLD AUTO: 157 THOU/MM3 (ref 130–400)
PMV BLD AUTO: 11.1 FL (ref 9.4–12.4)
POTASSIUM SERPL-SCNC: 5.2 MEQ/L (ref 3.5–5.2)
POTASSIUM SERPL-SCNC: 5.6 MEQ/L (ref 3.5–5.2)
RBC # BLD AUTO: 3.66 MILL/MM3 (ref 4.2–5.4)
SODIUM SERPL-SCNC: 139 MEQ/L (ref 135–145)
WBC # BLD AUTO: 3.3 THOU/MM3 (ref 4.8–10.8)

## 2023-11-01 PROCEDURE — 1200000003 HC TELEMETRY R&B

## 2023-11-01 PROCEDURE — 2580000003 HC RX 258: Performed by: INTERNAL MEDICINE

## 2023-11-01 PROCEDURE — 6360000002 HC RX W HCPCS: Performed by: NURSE PRACTITIONER

## 2023-11-01 PROCEDURE — 2700000000 HC OXYGEN THERAPY PER DAY

## 2023-11-01 PROCEDURE — 2060000000 HC ICU INTERMEDIATE R&B

## 2023-11-01 PROCEDURE — 6370000000 HC RX 637 (ALT 250 FOR IP)

## 2023-11-01 PROCEDURE — 99232 SBSQ HOSP IP/OBS MODERATE 35: CPT | Performed by: INTERNAL MEDICINE

## 2023-11-01 PROCEDURE — 94660 CPAP INITIATION&MGMT: CPT

## 2023-11-01 PROCEDURE — 94640 AIRWAY INHALATION TREATMENT: CPT

## 2023-11-01 PROCEDURE — 6370000000 HC RX 637 (ALT 250 FOR IP): Performed by: INTERNAL MEDICINE

## 2023-11-01 PROCEDURE — 36415 COLL VENOUS BLD VENIPUNCTURE: CPT

## 2023-11-01 PROCEDURE — 2580000003 HC RX 258

## 2023-11-01 PROCEDURE — 6360000002 HC RX W HCPCS

## 2023-11-01 PROCEDURE — 80048 BASIC METABOLIC PNL TOTAL CA: CPT

## 2023-11-01 PROCEDURE — 6360000002 HC RX W HCPCS: Performed by: INTERNAL MEDICINE

## 2023-11-01 PROCEDURE — 6370000000 HC RX 637 (ALT 250 FOR IP): Performed by: NURSE PRACTITIONER

## 2023-11-01 PROCEDURE — 2500000003 HC RX 250 WO HCPCS: Performed by: INTERNAL MEDICINE

## 2023-11-01 PROCEDURE — 84132 ASSAY OF SERUM POTASSIUM: CPT

## 2023-11-01 PROCEDURE — 82948 REAGENT STRIP/BLOOD GLUCOSE: CPT

## 2023-11-01 PROCEDURE — 85027 COMPLETE CBC AUTOMATED: CPT

## 2023-11-01 PROCEDURE — 94761 N-INVAS EAR/PLS OXIMETRY MLT: CPT

## 2023-11-01 RX ORDER — INSULIN LISPRO 100 [IU]/ML
15 INJECTION, SOLUTION INTRAVENOUS; SUBCUTANEOUS
Status: DISCONTINUED | OUTPATIENT
Start: 2023-11-01 | End: 2023-11-03 | Stop reason: HOSPADM

## 2023-11-01 RX ORDER — INSULIN GLARGINE 100 [IU]/ML
35 INJECTION, SOLUTION SUBCUTANEOUS NIGHTLY
Status: DISCONTINUED | OUTPATIENT
Start: 2023-11-01 | End: 2023-11-02

## 2023-11-01 RX ADMIN — ALBUTEROL SULFATE 2.5 MG: 2.5 SOLUTION RESPIRATORY (INHALATION) at 10:07

## 2023-11-01 RX ADMIN — IPRATROPIUM BROMIDE AND ALBUTEROL SULFATE 1 DOSE: .5; 3 SOLUTION RESPIRATORY (INHALATION) at 07:44

## 2023-11-01 RX ADMIN — AMLODIPINE BESYLATE 5 MG: 5 TABLET ORAL at 08:48

## 2023-11-01 RX ADMIN — CEFTRIAXONE SODIUM 1000 MG: 1 INJECTION, POWDER, FOR SOLUTION INTRAMUSCULAR; INTRAVENOUS at 08:35

## 2023-11-01 RX ADMIN — ROFLUMILAST 250 MCG: 500 TABLET ORAL at 08:52

## 2023-11-01 RX ADMIN — IPRATROPIUM BROMIDE AND ALBUTEROL SULFATE 1 DOSE: .5; 3 SOLUTION RESPIRATORY (INHALATION) at 15:51

## 2023-11-01 RX ADMIN — IPRATROPIUM BROMIDE AND ALBUTEROL SULFATE 1 DOSE: .5; 3 SOLUTION RESPIRATORY (INHALATION) at 21:01

## 2023-11-01 RX ADMIN — ENOXAPARIN SODIUM 30 MG: 100 INJECTION SUBCUTANEOUS at 08:49

## 2023-11-01 RX ADMIN — SODIUM ZIRCONIUM CYCLOSILICATE 10 G: 10 POWDER, FOR SUSPENSION ORAL at 11:09

## 2023-11-01 RX ADMIN — OXYCODONE HYDROCHLORIDE AND ACETAMINOPHEN 500 MG: 500 TABLET ORAL at 08:48

## 2023-11-01 RX ADMIN — DEXAMETHASONE SODIUM PHOSPHATE 6 MG: 4 INJECTION, SOLUTION INTRA-ARTICULAR; INTRALESIONAL; INTRAMUSCULAR; INTRAVENOUS; SOFT TISSUE at 20:57

## 2023-11-01 RX ADMIN — MICONAZOLE NITRATE: 2 POWDER TOPICAL at 20:57

## 2023-11-01 RX ADMIN — SODIUM ZIRCONIUM CYCLOSILICATE 10 G: 10 POWDER, FOR SUSPENSION ORAL at 16:06

## 2023-11-01 RX ADMIN — BARICITINIB 4 MG: 2 TABLET, FILM COATED ORAL at 08:47

## 2023-11-01 RX ADMIN — Medication 1000 UNITS: at 08:47

## 2023-11-01 RX ADMIN — AZITHROMYCIN MONOHYDRATE 500 MG: 500 INJECTION, POWDER, LYOPHILIZED, FOR SOLUTION INTRAVENOUS at 09:16

## 2023-11-01 RX ADMIN — ASPIRIN 81 MG: 81 TABLET, CHEWABLE ORAL at 08:49

## 2023-11-01 RX ADMIN — METOPROLOL TARTRATE 50 MG: 50 TABLET, FILM COATED ORAL at 08:48

## 2023-11-01 RX ADMIN — INSULIN LISPRO 15 UNITS: 100 INJECTION, SOLUTION INTRAVENOUS; SUBCUTANEOUS at 17:54

## 2023-11-01 RX ADMIN — SODIUM CHLORIDE, PRESERVATIVE FREE 10 ML: 5 INJECTION INTRAVENOUS at 20:57

## 2023-11-01 RX ADMIN — INSULIN LISPRO 10 UNITS: 100 INJECTION, SOLUTION INTRAVENOUS; SUBCUTANEOUS at 09:01

## 2023-11-01 RX ADMIN — PRAVASTATIN SODIUM 10 MG: 10 TABLET ORAL at 20:56

## 2023-11-01 RX ADMIN — METOPROLOL TARTRATE 50 MG: 50 TABLET, FILM COATED ORAL at 20:56

## 2023-11-01 RX ADMIN — MICONAZOLE NITRATE: 2 POWDER TOPICAL at 09:13

## 2023-11-01 RX ADMIN — INSULIN LISPRO 15 UNITS: 100 INJECTION, SOLUTION INTRAVENOUS; SUBCUTANEOUS at 12:55

## 2023-11-01 RX ADMIN — INSULIN GLARGINE 35 UNITS: 100 INJECTION, SOLUTION SUBCUTANEOUS at 20:57

## 2023-11-01 RX ADMIN — ENOXAPARIN SODIUM 30 MG: 100 INJECTION SUBCUTANEOUS at 20:57

## 2023-11-01 RX ADMIN — IPRATROPIUM BROMIDE AND ALBUTEROL SULFATE 1 DOSE: .5; 3 SOLUTION RESPIRATORY (INHALATION) at 12:16

## 2023-11-01 NOTE — PLAN OF CARE
Problem: Respiratory - Adult  Goal: Achieves optimal ventilation and oxygenation  Outcome: Progressing  Goal: Clear lung sounds  Outcome: Progressing     Problem: Chronic Conditions and Co-morbidities  Goal: Patient's chronic conditions and co-morbidity symptoms are monitored and maintained or improved  Outcome: Progressing     Problem: Discharge Planning  Goal: Discharge to home or other facility with appropriate resources  Outcome: Progressing  Flowsheets (Taken 10/31/2023 2100)  Discharge to home or other facility with appropriate resources: Identify barriers to discharge with patient and caregiver     Problem: Safety - Adult  Goal: Free from fall injury  Outcome: Progressing  Note: Falling star program remains in place. Call light and personal belongings within reach. Frequent visual monitoring continues. Toileting program inplace. Patient assisted in turning/repositioning at least once every 2 hours, and on a prn basis. Problem: Skin/Tissue Integrity  Goal: Absence of new skin breakdown  Description: 1. Monitor for areas of redness and/or skin breakdown  2. Assess vascular access sites hourly  3. Every 4-6 hours minimum:  Change oxygen saturation probe site  4. Every 4-6 hours:  If on nasal continuous positive airway pressure, respiratory therapy assess nares and determine need for appliance change or resting period. Outcome: Progressing  Note: Monitoring patient skin integrity for skin breakdown, turning and repositioning q2h per protocol. Patient demonstrates turning and repositioning self.

## 2023-11-01 NOTE — PLAN OF CARE
Problem: Respiratory - Adult  Goal: Achieves optimal ventilation and oxygenation  11/1/2023 0747 by Gladis Law RCP  Outcome: Progressing  Flowsheets (Taken 11/1/2023 0747)  Achieves optimal ventilation and oxygenation:   Assess for changes in respiratory status   Position to facilitate oxygenation and minimize respiratory effort   Respiratory therapy support as indicated   Assess and instruct to report shortness of breath or any respiratory difficulty

## 2023-11-01 NOTE — CARE COORDINATION
11/1/23, 11:01 AM EDT    DISCHARGE ON GOING [address] [facility] day: 5  Location: -01/001-A Reason for admit: COPD exacerbation ([address]) [J44.1]  Sepsis ([address] ) [A41.9]  Acute on chronic respiratory failure with hypoxia and hypercapnia ([address] ) [G01.22, J96.22]  COVID-19 virus infection [U07.1]   Procedure:   10/27 CXR: Possible small left-sided pleural effusion. Mild stable cardiomegaly. 10/30 CT chest: Left basilar pneumonia. 10/31 ECHO: EF 55%. Grade 1 diastolic dysfunction. Barriers to Discharge: Hospitalist and Pulmonology following. COVID+. Blood culture #2 Staph, coag negative. Repeat cultures no growth to date. O2 weaned down to 4.5L this am, sats 95%. Tachypneic at times. Proventil aerosols prn, Baricitinib 4mg daily, Rocephin iv daily, Decadron 6mg iv daily. Lovenox. DuoNeb q4hr [employer]. Lokelma po x1. Potassium 5.6. CO2 40. IS. Acapella. PCP: Мария Mccall  Readmission Risk Score: 12.9%  Patient Goals/Plan/Treatment Preferences: From home with  and son. Has home O2 and CPAP from [facility] (4L ATC baseline). Monitor for needs.

## 2023-11-01 NOTE — PLAN OF CARE
Problem: Respiratory - Adult  Goal: Achieves optimal ventilation and oxygenation  11/1/2023 1554 by Aram Remy RCP  Outcome: Progressing  Flowsheets (Taken 11/1/2023 2371)  Achieves optimal ventilation and oxygenation:   Assess for changes in respiratory status   Position to facilitate oxygenation and minimize respiratory effort   Respiratory therapy support as indicated   Assess and instruct to report shortness of breath or any respiratory difficulty

## 2023-11-02 LAB
ANION GAP SERPL CALC-SCNC: 11 MEQ/L (ref 8–16)
BACTERIA SPEC RESP CULT: NORMAL
BUN SERPL-MCNC: 23 MG/DL (ref 7–22)
CALCIUM SERPL-MCNC: 9.3 MG/DL (ref 8.5–10.5)
CHLORIDE SERPL-SCNC: 99 MEQ/L (ref 98–111)
CO2 SERPL-SCNC: 36 MEQ/L (ref 23–33)
CREAT SERPL-MCNC: 0.7 MG/DL (ref 0.4–1.2)
DEPRECATED RDW RBC AUTO: 56.6 FL (ref 35–45)
ERYTHROCYTE [DISTWIDTH] IN BLOOD BY AUTOMATED COUNT: 14.8 % (ref 11.5–14.5)
GFR SERPL CREATININE-BSD FRML MDRD: > 60 ML/MIN/1.73M2
GLUCOSE BLD STRIP.AUTO-MCNC: 131 MG/DL (ref 70–108)
GLUCOSE BLD STRIP.AUTO-MCNC: 182 MG/DL (ref 70–108)
GLUCOSE BLD STRIP.AUTO-MCNC: 213 MG/DL (ref 70–108)
GLUCOSE BLD STRIP.AUTO-MCNC: 274 MG/DL (ref 70–108)
GLUCOSE SERPL-MCNC: 188 MG/DL (ref 70–108)
GRAM STN SPEC: NORMAL
HCT VFR BLD AUTO: 36.8 % (ref 37–47)
HGB BLD-MCNC: 10.8 GM/DL (ref 12–16)
MCH RBC QN AUTO: 30.3 PG (ref 26–33)
MCHC RBC AUTO-ENTMCNC: 29.3 GM/DL (ref 32.2–35.5)
MCV RBC AUTO: 103.4 FL (ref 81–99)
PLATELET # BLD AUTO: 178 THOU/MM3 (ref 130–400)
PMV BLD AUTO: 11.7 FL (ref 9.4–12.4)
POTASSIUM SERPL-SCNC: 4.8 MEQ/L (ref 3.5–5.2)
RBC # BLD AUTO: 3.56 MILL/MM3 (ref 4.2–5.4)
SODIUM SERPL-SCNC: 146 MEQ/L (ref 135–145)
WBC # BLD AUTO: 3.2 THOU/MM3 (ref 4.8–10.8)

## 2023-11-02 PROCEDURE — 36415 COLL VENOUS BLD VENIPUNCTURE: CPT

## 2023-11-02 PROCEDURE — 85027 COMPLETE CBC AUTOMATED: CPT

## 2023-11-02 PROCEDURE — 80048 BASIC METABOLIC PNL TOTAL CA: CPT

## 2023-11-02 PROCEDURE — 6360000002 HC RX W HCPCS: Performed by: INTERNAL MEDICINE

## 2023-11-02 PROCEDURE — 2700000000 HC OXYGEN THERAPY PER DAY

## 2023-11-02 PROCEDURE — 97535 SELF CARE MNGMENT TRAINING: CPT

## 2023-11-02 PROCEDURE — 6360000002 HC RX W HCPCS

## 2023-11-02 PROCEDURE — 94660 CPAP INITIATION&MGMT: CPT

## 2023-11-02 PROCEDURE — 82948 REAGENT STRIP/BLOOD GLUCOSE: CPT

## 2023-11-02 PROCEDURE — 2580000003 HC RX 258

## 2023-11-02 PROCEDURE — 97530 THERAPEUTIC ACTIVITIES: CPT

## 2023-11-02 PROCEDURE — 6370000000 HC RX 637 (ALT 250 FOR IP)

## 2023-11-02 PROCEDURE — 2060000000 HC ICU INTERMEDIATE R&B

## 2023-11-02 PROCEDURE — 6370000000 HC RX 637 (ALT 250 FOR IP): Performed by: NURSE PRACTITIONER

## 2023-11-02 PROCEDURE — 99232 SBSQ HOSP IP/OBS MODERATE 35: CPT | Performed by: INTERNAL MEDICINE

## 2023-11-02 PROCEDURE — 2500000003 HC RX 250 WO HCPCS: Performed by: INTERNAL MEDICINE

## 2023-11-02 PROCEDURE — 6360000002 HC RX W HCPCS: Performed by: NURSE PRACTITIONER

## 2023-11-02 PROCEDURE — 2580000003 HC RX 258: Performed by: INTERNAL MEDICINE

## 2023-11-02 PROCEDURE — 6370000000 HC RX 637 (ALT 250 FOR IP): Performed by: INTERNAL MEDICINE

## 2023-11-02 PROCEDURE — 1200000003 HC TELEMETRY R&B

## 2023-11-02 PROCEDURE — 94761 N-INVAS EAR/PLS OXIMETRY MLT: CPT

## 2023-11-02 PROCEDURE — 97166 OT EVAL MOD COMPLEX 45 MIN: CPT

## 2023-11-02 PROCEDURE — 94640 AIRWAY INHALATION TREATMENT: CPT

## 2023-11-02 RX ORDER — FUROSEMIDE 40 MG/1
80 TABLET ORAL DAILY
Status: DISCONTINUED | OUTPATIENT
Start: 2023-11-02 | End: 2023-11-03 | Stop reason: HOSPADM

## 2023-11-02 RX ORDER — INSULIN GLARGINE 100 [IU]/ML
38 INJECTION, SOLUTION SUBCUTANEOUS NIGHTLY
Status: DISCONTINUED | OUTPATIENT
Start: 2023-11-03 | End: 2023-11-03

## 2023-11-02 RX ADMIN — ACETAMINOPHEN 650 MG: 325 TABLET ORAL at 16:08

## 2023-11-02 RX ADMIN — IPRATROPIUM BROMIDE AND ALBUTEROL SULFATE 1 DOSE: .5; 3 SOLUTION RESPIRATORY (INHALATION) at 12:44

## 2023-11-02 RX ADMIN — ENOXAPARIN SODIUM 30 MG: 100 INJECTION SUBCUTANEOUS at 21:27

## 2023-11-02 RX ADMIN — DEXAMETHASONE SODIUM PHOSPHATE 6 MG: 4 INJECTION, SOLUTION INTRA-ARTICULAR; INTRALESIONAL; INTRAMUSCULAR; INTRAVENOUS; SOFT TISSUE at 21:25

## 2023-11-02 RX ADMIN — OXYCODONE HYDROCHLORIDE AND ACETAMINOPHEN 500 MG: 500 TABLET ORAL at 08:25

## 2023-11-02 RX ADMIN — IPRATROPIUM BROMIDE AND ALBUTEROL SULFATE 1 DOSE: .5; 3 SOLUTION RESPIRATORY (INHALATION) at 16:33

## 2023-11-02 RX ADMIN — ASPIRIN 81 MG: 81 TABLET, CHEWABLE ORAL at 08:24

## 2023-11-02 RX ADMIN — SODIUM CHLORIDE, PRESERVATIVE FREE 10 ML: 5 INJECTION INTRAVENOUS at 21:24

## 2023-11-02 RX ADMIN — MICONAZOLE NITRATE: 2 POWDER TOPICAL at 21:24

## 2023-11-02 RX ADMIN — METOPROLOL TARTRATE 50 MG: 50 TABLET, FILM COATED ORAL at 08:25

## 2023-11-02 RX ADMIN — INSULIN LISPRO 15 UNITS: 100 INJECTION, SOLUTION INTRAVENOUS; SUBCUTANEOUS at 08:26

## 2023-11-02 RX ADMIN — INSULIN LISPRO 5 UNITS: 100 INJECTION, SOLUTION INTRAVENOUS; SUBCUTANEOUS at 08:26

## 2023-11-02 RX ADMIN — IPRATROPIUM BROMIDE AND ALBUTEROL SULFATE 1 DOSE: .5; 3 SOLUTION RESPIRATORY (INHALATION) at 09:15

## 2023-11-02 RX ADMIN — ENOXAPARIN SODIUM 30 MG: 100 INJECTION SUBCUTANEOUS at 08:25

## 2023-11-02 RX ADMIN — MICONAZOLE NITRATE: 2 POWDER TOPICAL at 08:26

## 2023-11-02 RX ADMIN — PRAVASTATIN SODIUM 10 MG: 10 TABLET ORAL at 21:25

## 2023-11-02 RX ADMIN — INSULIN GLARGINE 38 UNITS: 100 INJECTION, SOLUTION SUBCUTANEOUS at 21:27

## 2023-11-02 RX ADMIN — METOPROLOL TARTRATE 50 MG: 50 TABLET, FILM COATED ORAL at 21:26

## 2023-11-02 RX ADMIN — ROFLUMILAST 250 MCG: 500 TABLET ORAL at 08:25

## 2023-11-02 RX ADMIN — BARICITINIB 4 MG: 2 TABLET, FILM COATED ORAL at 08:24

## 2023-11-02 RX ADMIN — FUROSEMIDE 80 MG: 40 TABLET ORAL at 12:27

## 2023-11-02 RX ADMIN — IPRATROPIUM BROMIDE AND ALBUTEROL SULFATE 1 DOSE: .5; 3 SOLUTION RESPIRATORY (INHALATION) at 20:31

## 2023-11-02 RX ADMIN — AMLODIPINE BESYLATE 5 MG: 5 TABLET ORAL at 08:25

## 2023-11-02 RX ADMIN — CEFTRIAXONE SODIUM 1000 MG: 1 INJECTION, POWDER, FOR SOLUTION INTRAMUSCULAR; INTRAVENOUS at 08:24

## 2023-11-02 RX ADMIN — Medication 1000 UNITS: at 08:25

## 2023-11-02 RX ADMIN — SODIUM CHLORIDE, PRESERVATIVE FREE 10 ML: 5 INJECTION INTRAVENOUS at 08:24

## 2023-11-02 ASSESSMENT — PAIN DESCRIPTION - PAIN TYPE: TYPE: CHRONIC PAIN

## 2023-11-02 ASSESSMENT — PAIN - FUNCTIONAL ASSESSMENT: PAIN_FUNCTIONAL_ASSESSMENT: ACTIVITIES ARE NOT PREVENTED

## 2023-11-02 ASSESSMENT — PAIN SCALES - GENERAL
PAINLEVEL_OUTOF10: 0
PAINLEVEL_OUTOF10: 0
PAINLEVEL_OUTOF10: 2
PAINLEVEL_OUTOF10: 0

## 2023-11-02 ASSESSMENT — PAIN DESCRIPTION - LOCATION: LOCATION: HEAD

## 2023-11-02 ASSESSMENT — PAIN DESCRIPTION - DESCRIPTORS: DESCRIPTORS: ACHING

## 2023-11-02 NOTE — PLAN OF CARE
Problem: Respiratory - Adult  Goal: Achieves optimal ventilation and oxygenation  11/2/2023 0429 by Nimco Anderson RN  Flowsheets (Taken 11/1/2023 2000)  Achieves optimal ventilation and oxygenation:   Assess for changes in respiratory status   Assess for changes in mentation and behavior   Position to facilitate oxygenation and minimize respiratory effort   Oxygen supplementation based on oxygen saturation or arterial blood gases   Initiate smoking cessation protocol as indicated  11/1/2023 1554 by Guillermina Wetzel RCP  Outcome: Progressing  Flowsheets (Taken 11/1/2023 0742)  Achieves optimal ventilation and oxygenation:   Assess for changes in respiratory status   Position to facilitate oxygenation and minimize respiratory effort   Respiratory therapy support as indicated   Assess and instruct to report shortness of breath or any respiratory difficulty     Problem: Respiratory - Adult  Goal: Clear lung sounds  Note: Lungs sound clear     Problem: Chronic Conditions and Co-morbidities  Goal: Patient's chronic conditions and co-morbidity symptoms are monitored and maintained or improved  Flowsheets (Taken 11/1/2023 2000)  Care Plan - Patient's Chronic Conditions and Co-Morbidity Symptoms are Monitored and Maintained or Improved:   Monitor and assess patient's chronic conditions and comorbid symptoms for stability, deterioration, or improvement   Collaborate with multidisciplinary team to address chronic and comorbid conditions and prevent exacerbation or deterioration   Update acute care plan with appropriate goals if chronic or comorbid symptoms are exacerbated and prevent overall improvement and discharge     Problem: Discharge Planning  Goal: Discharge to home or other facility with appropriate resources  Flowsheets (Taken 11/1/2023 2000)  Discharge to home or other facility with appropriate resources:   Identify barriers to discharge with patient and caregiver   Arrange for needed discharge resources and

## 2023-11-02 NOTE — CARE COORDINATION
11/2/23, 2:59 PM EDT    DISCHARGE PLANNING EVALUATION     Kinjal CM reports pt is requesting [facility] at discharge, Marcelina Johnson provided list.   Sw spoke with pt, states she would prefer [facility], has had them in the past.   Vladimir spoke with Skye at [facility], accepted referral.  SW faxed chart.

## 2023-11-03 VITALS
HEIGHT: 67 IN | BODY MASS INDEX: 39.93 KG/M2 | SYSTOLIC BLOOD PRESSURE: 155 MMHG | OXYGEN SATURATION: 95 % | WEIGHT: 254.41 LBS | DIASTOLIC BLOOD PRESSURE: 85 MMHG | RESPIRATION RATE: 19 BRPM | HEART RATE: 76 BPM | TEMPERATURE: 98.6 F

## 2023-11-03 LAB
ANION GAP SERPL CALC-SCNC: 8 MEQ/L (ref 8–16)
BUN SERPL-MCNC: 28 MG/DL (ref 7–22)
CALCIUM SERPL-MCNC: 9.1 MG/DL (ref 8.5–10.5)
CHLORIDE SERPL-SCNC: 94 MEQ/L (ref 98–111)
CO2 SERPL-SCNC: 38 MEQ/L (ref 23–33)
CREAT SERPL-MCNC: 0.7 MG/DL (ref 0.4–1.2)
DEPRECATED RDW RBC AUTO: 57.1 FL (ref 35–45)
ERYTHROCYTE [DISTWIDTH] IN BLOOD BY AUTOMATED COUNT: 14.8 % (ref 11.5–14.5)
GFR SERPL CREATININE-BSD FRML MDRD: > 60 ML/MIN/1.73M2
GLUCOSE BLD STRIP.AUTO-MCNC: 195 MG/DL (ref 70–108)
GLUCOSE BLD STRIP.AUTO-MCNC: 271 MG/DL (ref 70–108)
GLUCOSE BLD STRIP.AUTO-MCNC: 276 MG/DL (ref 70–108)
GLUCOSE SERPL-MCNC: 313 MG/DL (ref 70–108)
HCT VFR BLD AUTO: 37.1 % (ref 37–47)
HGB BLD-MCNC: 10.9 GM/DL (ref 12–16)
MCH RBC QN AUTO: 30.2 PG (ref 26–33)
MCHC RBC AUTO-ENTMCNC: 29.4 GM/DL (ref 32.2–35.5)
MCV RBC AUTO: 102.8 FL (ref 81–99)
PLATELET # BLD AUTO: 203 THOU/MM3 (ref 130–400)
PMV BLD AUTO: 11.4 FL (ref 9.4–12.4)
POTASSIUM SERPL-SCNC: 4.1 MEQ/L (ref 3.5–5.2)
RBC # BLD AUTO: 3.61 MILL/MM3 (ref 4.2–5.4)
SODIUM SERPL-SCNC: 140 MEQ/L (ref 135–145)
WBC # BLD AUTO: 3 THOU/MM3 (ref 4.8–10.8)

## 2023-11-03 PROCEDURE — 2500000003 HC RX 250 WO HCPCS: Performed by: INTERNAL MEDICINE

## 2023-11-03 PROCEDURE — 94640 AIRWAY INHALATION TREATMENT: CPT

## 2023-11-03 PROCEDURE — 82948 REAGENT STRIP/BLOOD GLUCOSE: CPT

## 2023-11-03 PROCEDURE — 6370000000 HC RX 637 (ALT 250 FOR IP): Performed by: NURSE PRACTITIONER

## 2023-11-03 PROCEDURE — 97535 SELF CARE MNGMENT TRAINING: CPT

## 2023-11-03 PROCEDURE — 97530 THERAPEUTIC ACTIVITIES: CPT

## 2023-11-03 PROCEDURE — 6360000002 HC RX W HCPCS

## 2023-11-03 PROCEDURE — 6370000000 HC RX 637 (ALT 250 FOR IP): Performed by: INTERNAL MEDICINE

## 2023-11-03 PROCEDURE — 6370000000 HC RX 637 (ALT 250 FOR IP)

## 2023-11-03 PROCEDURE — 2580000003 HC RX 258

## 2023-11-03 PROCEDURE — 85027 COMPLETE CBC AUTOMATED: CPT

## 2023-11-03 PROCEDURE — 80048 BASIC METABOLIC PNL TOTAL CA: CPT

## 2023-11-03 PROCEDURE — 2700000000 HC OXYGEN THERAPY PER DAY

## 2023-11-03 PROCEDURE — 94761 N-INVAS EAR/PLS OXIMETRY MLT: CPT

## 2023-11-03 PROCEDURE — 36415 COLL VENOUS BLD VENIPUNCTURE: CPT

## 2023-11-03 PROCEDURE — 99232 SBSQ HOSP IP/OBS MODERATE 35: CPT | Performed by: INTERNAL MEDICINE

## 2023-11-03 PROCEDURE — 94660 CPAP INITIATION&MGMT: CPT

## 2023-11-03 RX ORDER — PREDNISONE 10 MG/1
TABLET ORAL
Qty: 90 TABLET | Refills: 3 | Status: SHIPPED
Start: 2023-11-07

## 2023-11-03 RX ORDER — DEXAMETHASONE 6 MG/1
6 TABLET ORAL
Qty: 3 TABLET | Refills: 0 | Status: SHIPPED | OUTPATIENT
Start: 2023-11-03 | End: 2023-11-06

## 2023-11-03 RX ORDER — INSULIN GLARGINE 100 [IU]/ML
42 INJECTION, SOLUTION SUBCUTANEOUS NIGHTLY
Status: DISCONTINUED | OUTPATIENT
Start: 2023-11-03 | End: 2023-11-03 | Stop reason: HOSPADM

## 2023-11-03 RX ORDER — HYDROCHLOROTHIAZIDE 25 MG/1
25 TABLET ORAL DAILY
Qty: 30 TABLET | Refills: 3 | Status: SHIPPED | OUTPATIENT
Start: 2023-11-03

## 2023-11-03 RX ORDER — DEXAMETHASONE 4 MG/1
6 TABLET ORAL EVERY 12 HOURS SCHEDULED
Status: DISCONTINUED | OUTPATIENT
Start: 2023-11-04 | End: 2023-11-03 | Stop reason: HOSPADM

## 2023-11-03 RX ORDER — GUAIFENESIN 600 MG/1
600 TABLET, EXTENDED RELEASE ORAL 2 TIMES DAILY
Status: DISCONTINUED | OUTPATIENT
Start: 2023-11-03 | End: 2023-11-03 | Stop reason: HOSPADM

## 2023-11-03 RX ORDER — AMLODIPINE BESYLATE 10 MG/1
10 TABLET ORAL DAILY
Qty: 30 TABLET | Refills: 1 | Status: SHIPPED | OUTPATIENT
Start: 2023-11-04

## 2023-11-03 RX ADMIN — ENOXAPARIN SODIUM 30 MG: 100 INJECTION SUBCUTANEOUS at 10:44

## 2023-11-03 RX ADMIN — MICONAZOLE NITRATE: 2 POWDER TOPICAL at 10:44

## 2023-11-03 RX ADMIN — Medication 1000 UNITS: at 10:44

## 2023-11-03 RX ADMIN — OXYCODONE HYDROCHLORIDE AND ACETAMINOPHEN 500 MG: 500 TABLET ORAL at 10:43

## 2023-11-03 RX ADMIN — IPRATROPIUM BROMIDE AND ALBUTEROL SULFATE 1 DOSE: .5; 3 SOLUTION RESPIRATORY (INHALATION) at 08:39

## 2023-11-03 RX ADMIN — FUROSEMIDE 80 MG: 40 TABLET ORAL at 10:44

## 2023-11-03 RX ADMIN — IPRATROPIUM BROMIDE AND ALBUTEROL SULFATE 1 DOSE: .5; 3 SOLUTION RESPIRATORY (INHALATION) at 14:33

## 2023-11-03 RX ADMIN — METOPROLOL TARTRATE 50 MG: 50 TABLET, FILM COATED ORAL at 10:44

## 2023-11-03 RX ADMIN — SODIUM CHLORIDE, PRESERVATIVE FREE 10 ML: 5 INJECTION INTRAVENOUS at 10:46

## 2023-11-03 RX ADMIN — INSULIN LISPRO 15 UNITS: 100 INJECTION, SOLUTION INTRAVENOUS; SUBCUTANEOUS at 10:45

## 2023-11-03 RX ADMIN — ALBUTEROL SULFATE 2.5 MG: 2.5 SOLUTION RESPIRATORY (INHALATION) at 00:58

## 2023-11-03 RX ADMIN — GUAIFENESIN 600 MG: 600 TABLET, EXTENDED RELEASE ORAL at 10:44

## 2023-11-03 RX ADMIN — INSULIN LISPRO 10 UNITS: 100 INJECTION, SOLUTION INTRAVENOUS; SUBCUTANEOUS at 10:44

## 2023-11-03 RX ADMIN — ASPIRIN 81 MG: 81 TABLET, CHEWABLE ORAL at 10:43

## 2023-11-03 RX ADMIN — BARICITINIB 4 MG: 2 TABLET, FILM COATED ORAL at 10:43

## 2023-11-03 ASSESSMENT — PAIN SCALES - GENERAL
PAINLEVEL_OUTOF10: 0
PAINLEVEL_OUTOF10: 0

## 2023-11-03 NOTE — DISCHARGE SUMMARY
DISCHARGE SUMMARY      Patient Identification:   Ziyad Hutton   : 1955  MRN: [id number]   Account: [de-identified]      Patient's PCP: Leticia Chavarria Date: 10/27/2023     Discharge Date:   11/3/23    Admitting Physician: Jules Barnard MD     Discharge Physician: Mari Weldon DO     Discharge Diagnoses:    Acute on chronic hypoxic respiratory failure with hypoxia and hypercapnia  - Currently on 4L NC with SpO2 95%, which is her baseline  - Secondary to COPD exacerbation, COVID-19 infection, LLL pneumonia  - COVID-19 + 10/27/2023  - CT chest with LLL consolidation 10/30/23  - Follows pulmonology outpatient Sara [facility] baseline oxygen requirements: 4 L O2 at rest and activity, BiPaP nightly  - Initiated on 6 L nasal cannula on presentation later requiring BiPAP. - Per pulm: Continue on BiPAP with home settings  with BUR 12 while admitted remove BUR prior to discharge. - IV Decadron 6 mg x 10 days started 10/28/2023 per pulm, to continue rest of course upon discharge  - Vitamin D 1000 units daily, vitamin C 500 mg daily started 10/28/2023  - IVF (10/28-10/30)  - Single blood culture positive for gram-positive cocci, likely contaminant: mec a/C detected, Staph epidermidis detected, staph SPP  -Respiratory culture showing: Many gram positive cocci in pairs and chains. Rare gram negative bacilli. - Baricitinib 4mg PO daily, d/c upon discharge  - Acapella and incentive spirometry     Covid 19 Left Basilar Pneumonia concerning for concomitant superimposed bacterial process  - CT chest with LLL consolidation 10/30/23  - Per pulm: Rocephin 1g QD x 7 doses (10/20-) and Azithromycin 500mg QD x 3 doses (10/20- )  - WBC WNL  - Respiratory culture showing: Many gram positive cocci in pairs and chains. Rare gram negative bacilli.      COPD Gold stage 4D, acute exacerbation  -Secondary to COVID-19, pneumonia  -CT chest with LLL consolidation 10/30/23  -Decadron,

## 2023-11-03 NOTE — FLOWSHEET NOTE
11/03/23 1152   Safe Environment   Safety Measures Call light within reach; Caregiver at bedside;Gripper socks;Standard Safety Measures     Call placed to patients room, patient responds to audio, permitted video. Patient alert and oriented, sitting in chair. Patient denied any voiced concerns/complaints at this time. Patient educated on utilizing call light. Call light within reach, no signs or symtpoms of distress noted.

## 2023-11-03 NOTE — DISCHARGE INSTRUCTIONS
Complete course of Dexamethasone (this is for 3 more days after discharge) before resuming Daily prednisone after discharge

## 2023-11-03 NOTE — PLAN OF CARE
Problem: Respiratory - Adult  Goal: Achieves optimal ventilation and oxygenation  Outcome: Progressing   Patient wearing NIV HS and tolerating well.  Patient mutually agrees with goal of care

## 2023-11-03 NOTE — CARE COORDINATION
11/3/23, 6:58 AM EDT    DISCHARGE ON GOING [address] [facility] day: 7  Location: -01/001-A Reason for admit: COPD exacerbation ([address]) [J44.1]  Sepsis ([address]) [A41.9]  Acute on chronic respiratory failure with hypoxia and hypercapnia ([address] ) [V29.84, J96.22]  COVID-19 virus infection [U07.1]   Procedure:   10/27 CXR: Possible small left-sided pleural effusion. Mild stable cardiomegaly. 10/30 CT chest: Left basilar pneumonia. 10/31 ECHO: EF 55%. Grade 1 diastolic dysfunction. Barriers to Discharge: Hospitalist and Pulmonology following. COVID+. Blood culture #2 Staph, coag negative. Repeat cultures no growth to date. BiPAP with sleep. O2 4-5L when off BiPAP. BUN 28 (23). Baricitinib 4mg daily. Rocephin iv daily. Decadron iv daily. Lovenox. Mucinex. DuoNeb q4hr [address] [facility]. PCP: Izzy Maynard  Readmission Risk Score: 15.2%  Patient Goals/Plan/Treatment Preferences: From home with  and son. Has home O2 and CPAP from [facility] (4L ATC baseline). Monitor for needs. Plan home with new HH. SW following.

## 2023-11-04 LAB — BACTERIA BLD AEROBE CULT: NORMAL

## 2024-01-23 PROBLEM — L60.0 INGROWN NAIL OF GREAT TOE: Status: ACTIVE | Noted: 2022-11-28

## 2024-01-23 PROBLEM — E11.9 DIABETES MELLITUS (HCC): Status: ACTIVE | Noted: 2022-10-04

## 2024-01-23 PROBLEM — E66.9 OBESITY WITH BODY MASS INDEX 30 OR GREATER: Status: ACTIVE | Noted: 2022-03-30

## 2024-01-23 PROBLEM — M25.511 PAIN OF RIGHT SHOULDER REGION: Status: ACTIVE | Noted: 2024-01-23

## 2024-01-23 PROBLEM — R07.9 CHEST PAIN: Status: ACTIVE | Noted: 2023-09-19

## 2024-01-23 PROBLEM — R74.01 ELEVATION OF LEVELS OF LIVER TRANSAMINASE LEVELS: Status: ACTIVE | Noted: 2022-10-04

## 2024-01-23 PROBLEM — M79.676 PAIN OF GREAT TOE: Status: ACTIVE | Noted: 2022-10-04

## 2024-01-23 PROBLEM — B37.31 CANDIDIASIS OF VAGINA: Status: ACTIVE | Noted: 2024-01-23

## 2024-01-23 PROBLEM — I70.209 ATHEROSCLEROSIS OF ARTERY OF LOWER EXTREMITY (HCC): Status: ACTIVE | Noted: 2022-10-17

## 2024-01-23 PROBLEM — E11.9 TYPE 2 DIABETES MELLITUS (HCC): Status: ACTIVE | Noted: 2022-11-28

## 2024-01-24 RX ORDER — NYSTATIN 100000 U/G
CREAM TOPICAL 2 TIMES DAILY
COMMUNITY

## 2024-01-26 ENCOUNTER — OFFICE VISIT (OUTPATIENT)
Dept: NEPHROLOGY | Age: 69
End: 2024-01-26
Payer: COMMERCIAL

## 2024-01-26 VITALS
HEART RATE: 90 BPM | BODY MASS INDEX: 36.95 KG/M2 | WEIGHT: 235.89 LBS | SYSTOLIC BLOOD PRESSURE: 134 MMHG | DIASTOLIC BLOOD PRESSURE: 70 MMHG | OXYGEN SATURATION: 96 %

## 2024-01-26 DIAGNOSIS — R80.8 OTHER PROTEINURIA: Primary | ICD-10-CM

## 2024-01-26 PROCEDURE — 99204 OFFICE O/P NEW MOD 45 MIN: CPT | Performed by: INTERNAL MEDICINE

## 2024-01-26 PROCEDURE — 1123F ACP DISCUSS/DSCN MKR DOCD: CPT | Performed by: INTERNAL MEDICINE

## 2024-01-26 PROCEDURE — 3075F SYST BP GE 130 - 139MM HG: CPT | Performed by: INTERNAL MEDICINE

## 2024-01-26 PROCEDURE — 3078F DIAST BP <80 MM HG: CPT | Performed by: INTERNAL MEDICINE

## 2024-01-26 RX ORDER — LISINOPRIL 10 MG/1
10 TABLET ORAL DAILY
Qty: 90 TABLET | Refills: 1 | Status: SHIPPED | OUTPATIENT
Start: 2024-01-26

## 2024-01-26 RX ORDER — VITAMIN B COMPLEX
1 CAPSULE ORAL DAILY
COMMUNITY

## 2024-01-26 NOTE — PROGRESS NOTES
Take 1 tablet by mouth 2 times daily   Yes Summer Pleitez MD   metFORMIN (GLUCOPHAGE) 500 MG tablet Take 1 tablet by mouth 2 times daily (with meals) 500 in the am and 1000mg in the evening   Yes Summer Pleitez MD   Cholecalciferol (VITAMIN D) 2000 units CAPS capsule Take by mouth   Yes Summer Pleitez MD   Multiple Vitamins-Minerals (THERAPEUTIC MULTIVITAMIN-MINERALS) tablet Take 1 tablet by mouth daily   Yes Summer Pleitez MD   pravastatin (PRAVACHOL) 10 MG tablet Take 1 tablet by mouth daily   Yes Summer Pleitez MD   nystatin (MYCOSTATIN) 663480 UNIT/GM cream Apply topically 2 times daily Apply topically 2 times daily.  Patient not taking: Reported on 1/26/2024    Summer Pleitez MD   alogliptin (NESINA) 12.5 MG TABS tablet Take 1 tablet by mouth daily  Patient not taking: Reported on 1/24/2024 7/28/21   Audie Finley DO   levocetirizine (XYZAL) 5 MG tablet Take 1 tablet by mouth nightly  Patient not taking: Reported on 1/24/2024 8/19/20   Talia Suarez APRN - CNP   fluticasone (FLONASE) 50 MCG/ACT nasal spray 2 sprays by Nasal route daily  Patient not taking: Reported on 1/26/2024 8/19/20 8/3/22  Talia Suarez APRN - CNP   Cyanocobalamin (B-12 PO) Take by mouth  Patient not taking: Reported on 1/24/2024    Summer Pleitez MD   Ibuprofen (ADVIL PO) Take 600 mg by mouth as needed (pain/fever)  Patient not taking: Reported on 1/24/2024    Summer Pleitez MD   FUROSEMIDE PO Take 80 mg by mouth daily  Patient not taking: Reported on 1/24/2024    Summer Pleitez MD        Physical Examination:  VITALS:  /70 (Site: Left Upper Arm, Position: Sitting, Cuff Size: Large Adult)   Pulse 90   Wt 107 kg (235 lb 14.3 oz)   SpO2 96%   BMI 36.95 kg/m²   Body mass index is 36.95 kg/m².  Wt Readings from Last 3 Encounters:   01/26/24 107 kg (235 lb 14.3 oz)   11/03/23 115.4 kg (254 lb 6.6 oz)   08/16/23 107 kg (236 lb)     Constitutional and General

## 2024-02-01 ENCOUNTER — TELEPHONE (OUTPATIENT)
Dept: NEPHROLOGY | Age: 69
End: 2024-02-01

## 2024-02-01 DIAGNOSIS — R80.8 OTHER PROTEINURIA: ICD-10-CM

## 2024-02-01 NOTE — TELEPHONE ENCOUNTER
----- Message from Lona Marie DO sent at 2/1/2024  2:52 PM EST -----  US looks ok will discuss at next visit

## 2024-02-06 ENCOUNTER — TELEPHONE (OUTPATIENT)
Dept: PULMONOLOGY | Age: 69
End: 2024-02-06

## 2024-02-06 NOTE — TELEPHONE ENCOUNTER
Started prior authorization request through [employer] for CT lung screen scheduled for 2/12/24 at [facility].    Received authorization for CT through [employer] (see media).  ID: [id number]  Valid through: 2/6/24-3/6/24

## 2024-02-21 DIAGNOSIS — Z87.891 PERSONAL HISTORY OF TOBACCO USE: ICD-10-CM

## 2024-03-26 ENCOUNTER — HOSPITAL ENCOUNTER (OUTPATIENT)
Dept: CT IMAGING | Age: 69
Discharge: HOME | End: 2024-03-26
Attending: RADIOLOGY

## 2024-03-26 DIAGNOSIS — Z00.6 EXAMINATION FOR NORMAL COMPARISON FOR CLINICAL RESEARCH: ICD-10-CM

## 2024-03-27 ENCOUNTER — OFFICE VISIT (OUTPATIENT)
Dept: PULMONOLOGY | Age: 69
End: 2024-03-27
Payer: COMMERCIAL

## 2024-03-27 VITALS
TEMPERATURE: 98.9 F | HEIGHT: 67 IN | BODY MASS INDEX: 38.86 KG/M2 | SYSTOLIC BLOOD PRESSURE: 124 MMHG | HEART RATE: 85 BPM | OXYGEN SATURATION: 97 % | WEIGHT: 247.6 LBS | DIASTOLIC BLOOD PRESSURE: 70 MMHG

## 2024-03-27 DIAGNOSIS — Z92.241 STEROID-DEPENDENT COPD (HCC): ICD-10-CM

## 2024-03-27 DIAGNOSIS — J96.12 CHRONIC RESPIRATORY FAILURE WITH HYPOXIA AND HYPERCAPNIA (HCC): ICD-10-CM

## 2024-03-27 DIAGNOSIS — Z87.891 PERSONAL HISTORY OF TOBACCO USE: ICD-10-CM

## 2024-03-27 DIAGNOSIS — E66.9 OBESITY (BMI 30-39.9): ICD-10-CM

## 2024-03-27 DIAGNOSIS — J44.1 COPD EXACERBATION (HCC): Primary | ICD-10-CM

## 2024-03-27 DIAGNOSIS — J44.9 STEROID-DEPENDENT COPD (HCC): ICD-10-CM

## 2024-03-27 DIAGNOSIS — J44.9 STAGE 4 VERY SEVERE COPD BY GOLD CLASSIFICATION (HCC): ICD-10-CM

## 2024-03-27 DIAGNOSIS — J96.11 CHRONIC RESPIRATORY FAILURE WITH HYPOXIA AND HYPERCAPNIA (HCC): ICD-10-CM

## 2024-03-27 PROCEDURE — 1123F ACP DISCUSS/DSCN MKR DOCD: CPT | Performed by: NURSE PRACTITIONER

## 2024-03-27 PROCEDURE — 3074F SYST BP LT 130 MM HG: CPT | Performed by: NURSE PRACTITIONER

## 2024-03-27 PROCEDURE — 3078F DIAST BP <80 MM HG: CPT | Performed by: NURSE PRACTITIONER

## 2024-03-27 PROCEDURE — 99215 OFFICE O/P EST HI 40 MIN: CPT | Performed by: NURSE PRACTITIONER

## 2024-03-27 RX ORDER — PREDNISONE 10 MG/1
40 TABLET ORAL DAILY
Qty: 20 TABLET | Refills: 0 | Status: SHIPPED | OUTPATIENT
Start: 2024-03-27 | End: 2024-04-01

## 2024-03-27 RX ORDER — AZITHROMYCIN 500 MG/1
500 TABLET, FILM COATED ORAL DAILY
Qty: 3 TABLET | Refills: 0 | Status: SHIPPED | OUTPATIENT
Start: 2024-03-27 | End: 2024-03-30

## 2024-03-27 RX ORDER — ROFLUMILAST 250 UG/1
250 TABLET ORAL DAILY
Qty: 30 TABLET | Refills: 6 | Status: SHIPPED | OUTPATIENT
Start: 2024-03-27

## 2024-03-27 RX ORDER — IPRATROPIUM BROMIDE AND ALBUTEROL SULFATE 2.5; .5 MG/3ML; MG/3ML
1 SOLUTION RESPIRATORY (INHALATION) EVERY 4 HOURS PRN
Qty: 1080 ML | Refills: 3 | Status: SHIPPED | OUTPATIENT
Start: 2024-03-27

## 2024-03-27 RX ORDER — ARFORMOTEROL TARTRATE 15 UG/2ML
SOLUTION RESPIRATORY (INHALATION)
Qty: 120 ML | Refills: 11 | Status: SHIPPED | OUTPATIENT
Start: 2024-03-27

## 2024-03-27 ASSESSMENT — ENCOUNTER SYMPTOMS
CHEST TIGHTNESS: 1
COUGH: 1
NAUSEA: 1
SHORTNESS OF BREATH: 1

## 2024-03-27 NOTE — PROGRESS NOTES
[facility]     Patient: MOLLY MATHUR, 69 y.o.   : 1955  3/27/2024    Pt of Dr. Paris     Subjective     Chief Complaint   Patient presents with    Follow-up     7 month [facility] hospital follow up for hypoxic respiratory failure with CT 24.        HPI       Patient presents for 3 months Hospital   follow up   Patient was admitted to [facility] 10/27/2023 through 11/3/2023.  Admitted for acute on chronic hypoxic respiratory failure with hypoxia and hypercapnia.  Baseline oxygen 4 L/min nasal cannula, she was requiring 6 L nasal cannula upon presentation..  Respiratory failure was secondary to COPD exacerbation,'s COVID-19 infection, left lower lobe pneumonia.  She was COVID-positive 10/27/2023.  CT chest with left lower lobe consolidation completed 10/30/2023.  She is on BiPAP at night for chronic respiratory failure.  settings  with backup rate 12 during her admission, home settings  without BUR   She was treated in the hospital with Rocephin 1 g daily x 7 doses and azithromycin 500 mg daily x 3 doses.  Respiratory culture showed many gram-positive cocci in pairs and chains.  Rare Gram negative bacilli.  Underlying chronic diastolic heart failure.  Echo 10/27/2023 with EF 55%.  No regional left ventricle wall motion abnormalities and wall thickness was within the normal limits, abnormal left ventricular relaxation consistent with grade 1 diastolic dysfunction.  Left atrium is mildly dilated.  BMP was within normal limits.  Treated with Lasix     Stage IV COPD , FEV 1 24% - steroid dependent , on 10 mg Prednisone PO daily   Never followed through with Dexa bone scan in past     Evaluated for lung transplant, needs to get BMI down to qualify   Has not been successful in loosing weight   Sedentary lifestyle      A1A - MM   On 4LPM O2 ATC including bleed into PAP - oxygen is through [facility] pharmacy and BIPAP through [facility]     Current Treatment:She

## 2024-04-15 ENCOUNTER — HOSPITAL ENCOUNTER (OUTPATIENT)
Dept: WOMENS IMAGING | Age: 69
Discharge: HOME | End: 2024-04-15
Attending: RADIOLOGY

## 2024-04-15 DIAGNOSIS — M81.8 OTHER OSTEOPOROSIS WITHOUT CURRENT PATHOLOGICAL FRACTURE: Primary | ICD-10-CM

## 2024-04-15 DIAGNOSIS — Z92.241 STEROID-DEPENDENT COPD (HCC): ICD-10-CM

## 2024-04-15 DIAGNOSIS — Z00.6 ENCOUNTER FOR EXAMINATION FOR NORMAL COMPARISON OR CONTROL IN CLINICAL RESEARCH PROGRAM: ICD-10-CM

## 2024-04-15 DIAGNOSIS — J44.9 STEROID-DEPENDENT COPD (HCC): ICD-10-CM

## 2024-04-16 ENCOUNTER — TELEPHONE (OUTPATIENT)
Dept: PULMONOLOGY | Age: 69
End: 2024-04-16

## 2024-04-16 NOTE — TELEPHONE ENCOUNTER
Phoned patient to notify dexa scan shows osteoporosis and inform she is high risk of bone fracture due to fragility of her bones and taking steroids per Sara Gordon.  Informed referral was placed to bone fragility clinic to discuss medications to help strengthen bones.  Patient stated bone fragility clinic already called patient today and she is currently scheduled for their next opening in July 2024.    Patient asked if she should completely stop taking steroids since \"July is three months away\"?  Please advise, thank you.

## 2024-04-16 NOTE — TELEPHONE ENCOUNTER
----- Message from SEVERIANO Moran CNP sent at 4/16/2024 12:00 PM EDT -----  Patient's dexa scan shows osteoporosis.  She is high risk of bone fracture due to fragility of her bones and taking steroids.  Needs referral to bone fragility clinic to discuss medications to help strengthen bones.   Order placed.

## 2024-04-17 NOTE — TELEPHONE ENCOUNTER
No we would want to do a wean off the steroids , not abruptly stop them.   Continue steroids as prescribed, we can discuss further at next visit , or schedule sooner one with me to discuss

## 2024-04-18 NOTE — TELEPHONE ENCOUNTER
Phoned patient and left message to call office to inform of Sara Gordon's recommendation regarding steroids & to offer sooner appointment with Sara if patient wishes.  Left office number to call back and discuss.

## 2024-04-19 LAB
BUN BLDV-MCNC: 22 MG/DL
CALCIUM SERPL-MCNC: 10 MG/DL
CHLORIDE BLD-SCNC: 100 MMOL/L
CO2: 32 MMOL/L
CREAT SERPL-MCNC: 0.8 MG/DL
CREATININE, URINE: 39.9
EGFR: 76
GLUCOSE BLD-MCNC: 194 MG/DL
MICROALBUMIN/CREAT 24H UR: 14 MG/G{CREAT}
MICROALBUMIN/CREAT UR-RTO: 35.1
POTASSIUM SERPL-SCNC: 138 MMOL/L
SODIUM BLD-SCNC: 138 MMOL/L

## 2024-04-19 NOTE — TELEPHONE ENCOUNTER
Phoned patient and left message to call office to inform of Sara Gordon's recommendation regarding steroids.   SECOND ATTEMPT.

## 2024-04-22 NOTE — TELEPHONE ENCOUNTER
Phoned patient (third attempt) to inform per Sara Gordon that she would want patient to wean off the steroids, not abruptly stop them.  Informed patient Sara wants patient to continue steroids as prescribed and they could discuss further at next visit or schedule a sooner appointment with Sara.  Patient expressed understanding.  Offered patient sooner appointment with Sara Gordon but patient stated at this time she wishes to keep follow up scheduled on 7/3/24 at 1:00pm.  Patient stated if anything changes she will call office for a sooner appointment.

## 2024-04-23 ENCOUNTER — OFFICE VISIT (OUTPATIENT)
Dept: NEPHROLOGY | Age: 69
End: 2024-04-23
Payer: COMMERCIAL

## 2024-04-23 VITALS
DIASTOLIC BLOOD PRESSURE: 74 MMHG | WEIGHT: 246 LBS | BODY MASS INDEX: 39.11 KG/M2 | HEART RATE: 100 BPM | SYSTOLIC BLOOD PRESSURE: 130 MMHG | OXYGEN SATURATION: 98 %

## 2024-04-23 DIAGNOSIS — R80.8 OTHER PROTEINURIA: Primary | ICD-10-CM

## 2024-04-23 PROCEDURE — 3075F SYST BP GE 130 - 139MM HG: CPT | Performed by: INTERNAL MEDICINE

## 2024-04-23 PROCEDURE — 1123F ACP DISCUSS/DSCN MKR DOCD: CPT | Performed by: INTERNAL MEDICINE

## 2024-04-23 PROCEDURE — 99214 OFFICE O/P EST MOD 30 MIN: CPT | Performed by: INTERNAL MEDICINE

## 2024-04-23 PROCEDURE — 3078F DIAST BP <80 MM HG: CPT | Performed by: INTERNAL MEDICINE

## 2024-04-23 NOTE — PROGRESS NOTES
[facility] [place] [facility]  [facility]YPERTENS[facility]  [address]  [address]  [place] [place] [facility] [address]  Dept: [phone]  Loc: [phone]  Progress Note  2024 11:53 AM      Pt Name:    Ginna Jaramillo  YOB: 1955  Primary Care Physician:  Matilde Osorio     Chief Complaint:   Chief Complaint   Patient presents with    Follow-up     Proteinuria         History of Present Illness:   This is a follow-up visit for CKD II/proteinuria.   She has a hx of diastolic CHF, severe COPD , chronic hypoxic/hypercapneic resp failure on home O2, DM x 4-5 years (A1C 8.2), HTN.     Restarted lisinopril last visit.   Proteinuria improved.   She is seeing Dr. Cordero and states he mentioned Jardiance for her.          Pertinent items are noted in HPI.         Past History:  Past Medical History:   Diagnosis Date    CHF (congestive heart failure) (HCC)     COPD (chronic obstructive pulmonary disease) (HCC)     Diabetes mellitus (HCC)     Diastolic dysfunction     Environmental and seasonal allergies     HLD (hyperlipidemia)     Hypertension      Past Surgical History:   Procedure Laterality Date    APPENDECTOMY      BRONCHOSCOPY N/A 2021    BRONCHOSCOPY performed by Cheryl Paris MD at [facility]    BRONCHOSCOPY  2021    BRONCHOSCOPY FLUOROSCOPY performed by Cheryl Paris MD at [facility]    BRONCHOSCOPY  2021    BRONCHOSCOPY/TRANSBRONCHIAL LUNG BIOPSY performed by Cheryl Paris MD at [facility]     SECTION      FOOT SURGERY      HERNIA REPAIR          VITALS:  /74 (Site: Right Upper Arm, Position: Sitting, Cuff Size: Large Adult)   Pulse 100   Wt 111.6 kg (246 lb)   SpO2 98%   BMI 39.11 kg/m²   Wt Readings from Last 3 Encounters:   24 111.6 kg (246 lb)   24 112.3 kg (247 lb 9.6 oz)   24 107 kg (235 lb 14.3 oz)     Body mass index is 39.11 kg/m².     General Appearance: alert and cooperative

## 2024-07-03 ENCOUNTER — OFFICE VISIT (OUTPATIENT)
Dept: PULMONOLOGY | Age: 69
End: 2024-07-03
Payer: COMMERCIAL

## 2024-07-03 VITALS
DIASTOLIC BLOOD PRESSURE: 60 MMHG | BODY MASS INDEX: 36.1 KG/M2 | OXYGEN SATURATION: 93 % | WEIGHT: 230 LBS | HEIGHT: 67 IN | HEART RATE: 106 BPM | SYSTOLIC BLOOD PRESSURE: 130 MMHG | TEMPERATURE: 98.6 F

## 2024-07-03 DIAGNOSIS — I50.32 CHRONIC DIASTOLIC HEART FAILURE (HCC): ICD-10-CM

## 2024-07-03 DIAGNOSIS — J44.9 STEROID-DEPENDENT COPD (HCC): Primary | ICD-10-CM

## 2024-07-03 DIAGNOSIS — Z92.241 STEROID-DEPENDENT COPD (HCC): Primary | ICD-10-CM

## 2024-07-03 DIAGNOSIS — J96.11 CHRONIC RESPIRATORY FAILURE WITH HYPOXIA AND HYPERCAPNIA (HCC): ICD-10-CM

## 2024-07-03 DIAGNOSIS — E66.9 OBESITY WITH BODY MASS INDEX 30 OR GREATER: ICD-10-CM

## 2024-07-03 DIAGNOSIS — Z87.891 PERSONAL HISTORY OF TOBACCO USE: ICD-10-CM

## 2024-07-03 DIAGNOSIS — J96.12 CHRONIC RESPIRATORY FAILURE WITH HYPOXIA AND HYPERCAPNIA (HCC): ICD-10-CM

## 2024-07-03 DIAGNOSIS — J44.9 STAGE 4 VERY SEVERE COPD BY GOLD CLASSIFICATION (HCC): ICD-10-CM

## 2024-07-03 DIAGNOSIS — J44.1 COPD EXACERBATION (HCC): ICD-10-CM

## 2024-07-03 PROCEDURE — 3078F DIAST BP <80 MM HG: CPT | Performed by: NURSE PRACTITIONER

## 2024-07-03 PROCEDURE — 99215 OFFICE O/P EST HI 40 MIN: CPT | Performed by: NURSE PRACTITIONER

## 2024-07-03 PROCEDURE — 3075F SYST BP GE 130 - 139MM HG: CPT | Performed by: NURSE PRACTITIONER

## 2024-07-03 PROCEDURE — 1123F ACP DISCUSS/DSCN MKR DOCD: CPT | Performed by: NURSE PRACTITIONER

## 2024-07-03 PROCEDURE — G0296 VISIT TO DETERM LDCT ELIG: HCPCS | Performed by: NURSE PRACTITIONER

## 2024-07-03 RX ORDER — PREDNISONE 10 MG/1
40 TABLET ORAL DAILY
Qty: 20 TABLET | Refills: 0 | Status: SHIPPED | OUTPATIENT
Start: 2024-07-03 | End: 2024-07-08

## 2024-07-03 RX ORDER — AZITHROMYCIN 500 MG/1
500 TABLET, FILM COATED ORAL DAILY
Qty: 3 TABLET | Refills: 0 | Status: SHIPPED | OUTPATIENT
Start: 2024-07-03 | End: 2024-07-06

## 2024-07-03 RX ORDER — ALBUTEROL SULFATE 90 UG/1
2 AEROSOL, METERED RESPIRATORY (INHALATION) EVERY 4 HOURS PRN
Qty: 1 EACH | Refills: 11 | Status: SHIPPED | OUTPATIENT
Start: 2024-07-03

## 2024-07-03 ASSESSMENT — ENCOUNTER SYMPTOMS
NAUSEA: 1
CHEST TIGHTNESS: 1
COUGH: 1
SHORTNESS OF BREATH: 1

## 2024-07-03 NOTE — PROGRESS NOTES
some slight improvement with 7 lb weight loss over the last 7months   Pt counseled on obesity, health risks associated with obesity and counseled on need for weight reduction,   She is working with her \" diabetic doctor\" , watching what she eats and is discussing possibly adding a GLP-1       (Please note that portions of this note may have been with a voice recognition program.  Efforts were made to edit the dictation but occasionally words are mis-transcribed )     Will see Ginna Fang in:  8  months with CT lung screen   Billing based on time, total time on encounter= 93 min   Electronically signed by SEVERIANO Moran CNP on 7/3/2024 at 3:14 PM     Discussed with the patient the current USPSTF guidelines released March 9, 2021 for screening for lung cancer.    For adults aged 50 to 80 years who have a 20 pack-year smoking history and currently smoke or have quit within the past 15 years the grade B recommendation is to:  Screen for lung cancer with low-dose computed tomography (LDCT) every year.  Stop screening once a person has not smoked for 15 years or has a health problem that limits life expectancy or the ability to have lung surgery.    The patient  reports that she quit smoking about 3 years ago. Her smoking use included cigarettes. She started smoking about 46 years ago. She has a 64.9 pack-year smoking history. She has never used smokeless tobacco.. Discussed with patient the risks and benefits of screening, including over-diagnosis, false positive rate, and total radiation exposure.  The patient currently exhibits no signs or symptoms suggestive of lung cancer.  Discussed with patient the importance of compliance with yearly annual lung cancer screenings and willingness to undergo diagnosis and treatment if screening scan is positive.  In addition, the patient was counseled regarding the importance of remaining smoke free and/or total smoking cessation.    Also reviewed the following if the

## 2024-07-04 ENCOUNTER — HOSPITAL ENCOUNTER (OUTPATIENT)
Dept: GENERAL RADIOLOGY | Age: 69
Discharge: HOME | End: 2024-07-04

## 2024-07-04 DIAGNOSIS — Z00.6 ENCOUNTER FOR EXAMINATION FOR NORMAL COMPARISON OR CONTROL IN CLINICAL RESEARCH PROGRAM: ICD-10-CM

## 2024-07-17 ENCOUNTER — OFFICE VISIT (OUTPATIENT)
Dept: RHEUMATOLOGY | Age: 69
End: 2024-07-17
Payer: COMMERCIAL

## 2024-07-17 VITALS
HEIGHT: 67 IN | BODY MASS INDEX: 36.09 KG/M2 | HEART RATE: 106 BPM | SYSTOLIC BLOOD PRESSURE: 128 MMHG | OXYGEN SATURATION: 94 % | DIASTOLIC BLOOD PRESSURE: 78 MMHG | WEIGHT: 229.94 LBS

## 2024-07-17 DIAGNOSIS — Z79.52 LONG TERM SYSTEMIC STEROID USER: ICD-10-CM

## 2024-07-17 DIAGNOSIS — M81.0 AGE-RELATED OSTEOPOROSIS WITHOUT CURRENT PATHOLOGICAL FRACTURE: Primary | ICD-10-CM

## 2024-07-17 PROCEDURE — 1123F ACP DISCUSS/DSCN MKR DOCD: CPT | Performed by: INTERNAL MEDICINE

## 2024-07-17 PROCEDURE — 99204 OFFICE O/P NEW MOD 45 MIN: CPT | Performed by: INTERNAL MEDICINE

## 2024-07-17 PROCEDURE — 3074F SYST BP LT 130 MM HG: CPT | Performed by: INTERNAL MEDICINE

## 2024-07-17 PROCEDURE — 3078F DIAST BP <80 MM HG: CPT | Performed by: INTERNAL MEDICINE

## 2024-07-17 NOTE — PROGRESS NOTES
250 MCG tablet, Take 1 tablet by mouth daily, Disp: 30 tablet, Rfl: 6    ipratropium 0.5 mg-albuterol 2.5 mg (DUONEB) 0.5-2.5 (3) MG/3ML SOLN nebulizer solution, Inhale 3 mLs into the lungs every 4 hours as needed for Shortness of Breath, Disp: 1080 mL, Rfl: 3    arformoterol tartrate (BROVANA) 15 MCG/2ML NEBU, USE 1 VIAL IN NEBULIZER TWICE DAILY, Disp: 120 mL, Rfl: 11    b complex vitamins capsule, Take 1 capsule by mouth daily, Disp: , Rfl:     nystatin (MYCOSTATIN) 254988 UNIT/GM cream, Apply topically 2 times daily Apply topically 2 times daily., Disp: , Rfl:     amLODIPine (NORVASC) 10 MG tablet, Take 1 tablet by mouth daily, Disp: 30 tablet, Rfl: 1    predniSONE (DELTASONE) 10 MG tablet, Take 1 tablet by mouth once daily, Disp: 90 tablet, Rfl: 3    hydroCHLOROthiazide (HYDRODIURIL) 25 MG tablet, Take 1 tablet by mouth daily, Disp: 30 tablet, Rfl: 3    Liraglutide (VICTOZA) 18 MG/3ML SOPN SC injection, Inject 1.2 mg into the skin daily, Disp: , Rfl:     promethazine (PHENERGAN) 12.5 MG tablet, Take 1 tablet by mouth every 6 hours as needed for Nausea, Disp: , Rfl:     guaiFENesin (MUCINEX) 600 MG extended release tablet, Take 2 tablets by mouth 2 times daily, Disp: , Rfl:     alogliptin (NESINA) 12.5 MG TABS tablet, Take 1 tablet by mouth daily, Disp: 60 tablet, Rfl: 1    aspirin 81 MG tablet, Take 1 tablet by mouth daily, Disp: , Rfl:     metoprolol tartrate (LOPRESSOR) 50 MG tablet, Take 1 tablet by mouth 2 times daily, Disp: , Rfl:     metFORMIN (GLUCOPHAGE) 500 MG tablet, Take 1 tablet by mouth 2 times daily (with meals) 500 in the am and 1000mg in the evening, Disp: , Rfl:     Cholecalciferol (VITAMIN D) 2000 units CAPS capsule, Take by mouth, Disp: , Rfl:     Cyanocobalamin (B-12 PO), Take by mouth, Disp: , Rfl:     Multiple Vitamins-Minerals (THERAPEUTIC MULTIVITAMIN-MINERALS) tablet, Take 1 tablet by mouth daily, Disp: , Rfl:     pravastatin (PRAVACHOL) 10 MG tablet, Take 1 tablet by mouth daily, Disp:

## 2024-07-18 ASSESSMENT — ENCOUNTER SYMPTOMS
BACK PAIN: 1
WHEEZING: 1
SHORTNESS OF BREATH: 1

## 2024-08-20 DIAGNOSIS — J44.9 STAGE 4 VERY SEVERE COPD BY GOLD CLASSIFICATION (HCC): ICD-10-CM

## 2024-08-20 NOTE — TELEPHONE ENCOUNTER
Received refill request for Duoneb. Medication was last ordered by Sara Gordon. Medication was last ordered on 3/27/24 with 0 refills.   Patient was last seen in the office 7/3/24. Patient has a scheduled follow up 4/9/25.   Medication needs to be sent to [facility].

## 2024-08-21 RX ORDER — IPRATROPIUM BROMIDE AND ALBUTEROL SULFATE 2.5; .5 MG/3ML; MG/3ML
SOLUTION RESPIRATORY (INHALATION)
Qty: 360 ML | Refills: 3 | Status: SHIPPED | OUTPATIENT
Start: 2024-08-21

## 2024-09-09 RX ORDER — LISINOPRIL 10 MG/1
10 TABLET ORAL DAILY
Qty: 90 TABLET | Refills: 0 | Status: SHIPPED | OUTPATIENT
Start: 2024-09-09

## 2024-09-18 DIAGNOSIS — J44.9 STAGE 4 VERY SEVERE COPD BY GOLD CLASSIFICATION (HCC): ICD-10-CM

## 2024-09-18 DIAGNOSIS — Z79.52 LONG TERM SYSTEMIC STEROID USER: ICD-10-CM

## 2024-09-19 RX ORDER — PREDNISONE 10 MG/1
TABLET ORAL
Qty: 90 TABLET | Refills: 0 | Status: SHIPPED | OUTPATIENT
Start: 2024-09-19

## 2024-10-10 ENCOUNTER — HOSPITAL ENCOUNTER (EMERGENCY)
Age: 69
Discharge: HOME | End: 2024-10-10
Attending: EMERGENCY MEDICINE
Payer: COMMERCIAL

## 2024-10-10 ENCOUNTER — APPOINTMENT (OUTPATIENT)
Dept: GENERAL RADIOLOGY | Age: 69
End: 2024-10-10
Payer: COMMERCIAL

## 2024-10-10 VITALS
HEART RATE: 89 BPM | DIASTOLIC BLOOD PRESSURE: 68 MMHG | SYSTOLIC BLOOD PRESSURE: 135 MMHG | RESPIRATION RATE: 20 BRPM | TEMPERATURE: 98.2 F | OXYGEN SATURATION: 100 %

## 2024-10-10 DIAGNOSIS — H93.8X3 FULLNESS IN BOTH EARS: ICD-10-CM

## 2024-10-10 DIAGNOSIS — R07.9 CHEST PAIN, UNSPECIFIED TYPE: Primary | ICD-10-CM

## 2024-10-10 DIAGNOSIS — R06.02 SHORTNESS OF BREATH: ICD-10-CM

## 2024-10-10 LAB
ALBUMIN SERPL BCG-MCNC: 3.9 G/DL (ref 3.5–5.1)
ALP SERPL-CCNC: 106 U/L (ref 38–126)
ALT SERPL W/O P-5'-P-CCNC: 26 U/L (ref 11–66)
ANION GAP SERPL CALC-SCNC: 11 MEQ/L (ref 8–16)
AST SERPL-CCNC: 17 U/L (ref 5–40)
BASOPHILS ABSOLUTE: 0 THOU/MM3 (ref 0–0.1)
BASOPHILS NFR BLD AUTO: 0.4 %
BILIRUB SERPL-MCNC: 0.2 MG/DL (ref 0.3–1.2)
BILIRUB UR QL STRIP: NEGATIVE
BUN SERPL-MCNC: 19 MG/DL (ref 7–22)
CALCIUM SERPL-MCNC: 9.8 MG/DL (ref 8.5–10.5)
CHARACTER UR: CLEAR
CHLORIDE SERPL-SCNC: 96 MEQ/L (ref 98–111)
CO2 SERPL-SCNC: 31 MEQ/L (ref 23–33)
COLOR UR: YELLOW
CREAT SERPL-MCNC: 0.6 MG/DL (ref 0.4–1.2)
DEPRECATED RDW RBC AUTO: 54 FL (ref 35–45)
EOSINOPHIL NFR BLD AUTO: 0.3 %
EOSINOPHILS ABSOLUTE: 0 THOU/MM3 (ref 0–0.4)
ERYTHROCYTE [DISTWIDTH] IN BLOOD BY AUTOMATED COUNT: 14.7 % (ref 11.5–14.5)
GFR SERPL CREATININE-BSD FRML MDRD: > 90 ML/MIN/1.73M2
GLUCOSE SERPL-MCNC: 288 MG/DL (ref 70–108)
GLUCOSE UR QL STRIP.AUTO: >= 1000 MG/DL
HCT VFR BLD AUTO: 39 % (ref 37–47)
HGB BLD-MCNC: 11.6 GM/DL (ref 12–16)
HGB UR QL STRIP.AUTO: NEGATIVE
IMM GRANULOCYTES # BLD AUTO: 0.05 THOU/MM3 (ref 0–0.07)
IMM GRANULOCYTES NFR BLD AUTO: 0.5 %
KETONES UR QL STRIP.AUTO: NEGATIVE
LEUKOCYTE ESTERASE UR QL STRIP.AUTO: NEGATIVE
LYMPHOCYTES ABSOLUTE: 1.1 THOU/MM3 (ref 1–4.8)
LYMPHOCYTES NFR BLD AUTO: 11.4 %
MAGNESIUM SERPL-MCNC: 1.7 MG/DL (ref 1.6–2.4)
MCH RBC QN AUTO: 29.7 PG (ref 26–33)
MCHC RBC AUTO-ENTMCNC: 29.7 GM/DL (ref 32.2–35.5)
MCV RBC AUTO: 100 FL (ref 81–99)
MONOCYTES ABSOLUTE: 0.5 THOU/MM3 (ref 0.4–1.3)
MONOCYTES NFR BLD AUTO: 4.5 %
NEUTROPHILS ABSOLUTE: 8.3 THOU/MM3 (ref 1.8–7.7)
NEUTROPHILS NFR BLD AUTO: 82.9 %
NITRITE UR QL STRIP.AUTO: NEGATIVE
NRBC BLD AUTO-RTO: 0 /100 WBC
NT-PROBNP SERPL IA-MCNC: 52.5 PG/ML (ref 0–124)
OSMOLALITY SERPL CALC.SUM OF ELEC: 288.5 MOSMOL/KG (ref 275–300)
PH UR STRIP.AUTO: 7 [PH] (ref 5–9)
PLATELET # BLD AUTO: 235 THOU/MM3 (ref 130–400)
PMV BLD AUTO: 11.3 FL (ref 9.4–12.4)
POTASSIUM SERPL-SCNC: 4.5 MEQ/L (ref 3.5–5.2)
PROT SERPL-MCNC: 6.8 G/DL (ref 6.1–8)
PROT UR STRIP.AUTO-MCNC: NEGATIVE MG/DL
RBC # BLD AUTO: 3.9 MILL/MM3 (ref 4.2–5.4)
SODIUM SERPL-SCNC: 138 MEQ/L (ref 135–145)
SP GR UR REFRACT.AUTO: 1.02 (ref 1–1.03)
TROPONIN, HIGH SENSITIVITY: 11 NG/L (ref 0–12)
TROPONIN, HIGH SENSITIVITY: 13 NG/L (ref 0–12)
UROBILINOGEN UR QL STRIP.AUTO: 0.2 EU/DL (ref 0–1)
WBC # BLD AUTO: 10 THOU/MM3 (ref 4.8–10.8)

## 2024-10-10 PROCEDURE — 99285 EMERGENCY DEPT VISIT HI MDM: CPT

## 2024-10-10 PROCEDURE — 93005 ELECTROCARDIOGRAM TRACING: CPT

## 2024-10-10 PROCEDURE — 81003 URINALYSIS AUTO W/O SCOPE: CPT

## 2024-10-10 PROCEDURE — 83735 ASSAY OF MAGNESIUM: CPT

## 2024-10-10 PROCEDURE — 80053 COMPREHEN METABOLIC PANEL: CPT

## 2024-10-10 PROCEDURE — 71046 X-RAY EXAM CHEST 2 VIEWS: CPT

## 2024-10-10 PROCEDURE — 84484 ASSAY OF TROPONIN QUANT: CPT

## 2024-10-10 PROCEDURE — 6370000000 HC RX 637 (ALT 250 FOR IP)

## 2024-10-10 PROCEDURE — 83880 ASSAY OF NATRIURETIC PEPTIDE: CPT

## 2024-10-10 PROCEDURE — 85025 COMPLETE CBC W/AUTO DIFF WBC: CPT

## 2024-10-10 PROCEDURE — 36415 COLL VENOUS BLD VENIPUNCTURE: CPT

## 2024-10-10 RX ORDER — BUMETANIDE 0.25 MG/ML
2 INJECTION INTRAMUSCULAR; INTRAVENOUS ONCE
Status: DISCONTINUED | OUTPATIENT
Start: 2024-10-10 | End: 2024-10-10

## 2024-10-10 RX ORDER — NEOMYCIN SULFATE, POLYMYXIN B SULFATE, HYDROCORTISONE 3.5; 10000; 1 MG/ML; [USP'U]/ML; MG/ML
1 SOLUTION/ DROPS AURICULAR (OTIC) EVERY 8 HOURS SCHEDULED
Qty: 1 EACH | Refills: 0 | Status: SHIPPED | OUTPATIENT
Start: 2024-10-10 | End: 2024-10-17

## 2024-10-10 RX ORDER — ACETAMINOPHEN 500 MG
1000 TABLET ORAL ONCE
Status: COMPLETED | OUTPATIENT
Start: 2024-10-10 | End: 2024-10-10

## 2024-10-10 RX ADMIN — ACETAMINOPHEN 1000 MG: 500 TABLET ORAL at 20:33

## 2024-10-10 ASSESSMENT — ENCOUNTER SYMPTOMS
EYES NEGATIVE: 1
ABDOMINAL DISTENTION: 1

## 2024-10-10 NOTE — ED PROVIDER NOTES
PATIENT NAME: Ginna Jaramillo  MRN: [id number]  : 1955  TORRES: 10/10/2024    I performed a history and physical examination of the patient and discussed management with the Resident. I reviewed the Resident's note and agree with the documented findings and plan of care. Any areas of disagreement are noted on the chart. I was personally present for the key portions of any procedures and have documented in the chart those procedures where I was not present during the key portions. I have reviewed the emergency nurses triage note and agree with the chief complaint, past medical history, past surgical history, allergies, medications, social and family history as documented unless otherwise noted below.    MEDICAL DEDISION MAKING (MDM)     Ginna Jaramillo is a 69 y.o. female who presents to Emergency Department with Chest Pain, Shortness of Breath, and Ear Fullness     A 69-year-old female with PMH of HFpEF, stage IV COPD on home O2, CKD, DM and CAD presents with chest pain, SOB, ear fullness, and increasing B/L pitting edema for last 3 days.  She is currently taking 80 mg Lasix daily.    Exam: AVSS. Lungs CTAB. Heart: RRR, S1 and S2. Soft abdomen w/o tenderness. 3+ B/L pitting edema.     ED workup is reassuring.  No evidence patient has ACS, decompensating CHF or pneumonia.    Worsening edema could be secondary to medication side effects (amlodipine and liraglutide).    Discharge with PCP follow-up in 1 week.       Vitals:    10/10/24 1705 10/10/24 1900   BP: 136/66 135/68   Pulse: 96 89   Resp: 28 20   Temp: 98.2 °F (36.8 °C)    SpO2: 95% 100%     Labs Reviewed   CBC WITH AUTO DIFFERENTIAL - Abnormal; Notable for the following components:       Result Value    RBC 3.90 (*)     Hemoglobin 11.6 (*)     .0 (*)     MCHC 29.7 (*)     RDW-CV 14.7 (*)     RDW-SD 54.0 (*)     Neutrophils Absolute 8.3 (*)     All other components within normal limits   COMPREHENSIVE METABOLIC PANEL - Abnormal; Notable for the

## 2024-10-10 NOTE — ED PROVIDER NOTES
[facility] EMERGENCY DEPT  EMERGENCY DEPARTMENT ENCOUNTER          Pt Name: Ginna Jaramillo  MRN: [id number]  Birthdate 1955  Date of evaluation: 10/10/2024  Physician: Kelsi Camp MD  Supervising Attending Physician: Erik Palafox MD       CHIEF COMPLAINT       Chief Complaint   Patient presents with    Chest Pain    Shortness of Breath    Ear Fullness         HISTORY OF PRESENT ILLNESS    HPI    Ginna Jaramillo is a 69 y.o. female who presents to the emergency department from home, as a walk in to the ED lobby for evaluation of chest pain, ear pain and worsening edema. PMHx: Diabetes, CKD 2, HLD, HTN, Stage 4 COPD, Grade I diastolic dysfunction. Notes 3 day history of chest pain with radiation to the back. Notes worsening bilateral edema. R>L. Right leg redness noted. Normally takes 80mg Lasix daily. Has noticed worsening since starting liraglutide. On 4L O2 currently, which is her baseline. Notes some SOB and difficulty walking due to worsening edema. Feels as though her abdomen is more distended. Also notes ear pain bilaterally. Feels as though has ear infection. Heart Score 5.     The patient has no other acute complaints at this time.      REVIEW OF SYSTEMS   Review of Systems   Constitutional: Negative.    HENT: Negative.     Eyes: Negative.    Cardiovascular:  Positive for leg swelling.   Gastrointestinal:  Positive for abdominal distention.   Genitourinary: Negative.    Musculoskeletal: Negative.         PAST MEDICAL AND SURGICAL HISTORY     Past Medical History:   Diagnosis Date    CHF (congestive heart failure) (HCC)     COPD (chronic obstructive pulmonary disease) (HCC)     Diabetes mellitus (HCC)     Diastolic dysfunction     Environmental and seasonal allergies     HLD (hyperlipidemia)     Hypertension      Past Surgical History:   Procedure Laterality Date    APPENDECTOMY      BRONCHOSCOPY N/A 7/21/2021    BRONCHOSCOPY performed by Cheryl Paris MD at [facility]  administered this visit (None if left blank):   Medications   bumetanide (BUMEX) injection 2 mg (has no administration in time range)            PROCEDURES: (None if blank)  Procedures:     CRITICAL CARE:  None    MEDICATION CHANGES     New Prescriptions    NEOMYCIN-POLYMYXIN-HYDROCORTISONE 1 % SOLN OTIC SOLUTION    Place 1 drop into both ears every 8 hours for 7 days         FINAL DISPOSITION   Shared Decision-Making was performed, disposition discussed with the patient/family and questions answered.    Outpatient follow up (If applicable):  No follow-up provider specified.  The results of pertinent diagnostic studies and exam findings were discussed with the patient/surrogate. The patient’s provisional diagnosis and plan of care were discussed with the patient and present family who expressed understanding. Medications were reviewed and indications and risks of medications were discussed with the patient/family present. Strict return precautions and follow up instructions were discussed with the patient prior to discharge, with which the patient agrees.      FINAL DIAGNOSES:  Final diagnoses:   Chest pain, unspecified type   Shortness of breath   Fullness in both ears       Condition: condition: good  Dispo: Discharge to home  DISPOSITION    Condition at Disposition: Data Unavailable      This transcription was electronically signed. It was dictated by use of voice recognition software and electronically transcribed. The transcription may contain errors not detected in proofreading.    Electronically signed by Kelsi Camp MD on 10/10/24 at 6:03 PM EDT

## 2024-10-11 ENCOUNTER — TELEPHONE (OUTPATIENT)
Dept: PULMONOLOGY | Age: 69
End: 2024-10-11

## 2024-10-11 LAB
EKG ATRIAL RATE: 91 BPM
EKG P AXIS: 68 DEGREES
EKG P-R INTERVAL: 170 MS
EKG Q-T INTERVAL: 364 MS
EKG QRS DURATION: 128 MS
EKG QTC CALCULATION (BAZETT): 447 MS
EKG R AXIS: 30 DEGREES
EKG T AXIS: 32 DEGREES
EKG VENTRICULAR RATE: 91 BPM

## 2024-10-11 PROCEDURE — 93010 ELECTROCARDIOGRAM REPORT: CPT | Performed by: INTERNAL MEDICINE

## 2024-10-11 NOTE — TELEPHONE ENCOUNTER
Per Sara,   Patient needs scheduled for an ED follow up in [facility].       Patient scheduled for this upcoming Wednesday on 10/16/24 at 12:40pm.

## 2024-10-11 NOTE — TELEPHONE ENCOUNTER
----- Message from SEVERIANO Moran CNP sent at 10/11/2024 10:29 AM EDT -----        ----- Message -----  From: Erik Palafox MD  Sent: 10/11/2024   8:47 AM EDT  To: SEVERIANO Moran CNP

## 2024-10-11 NOTE — PROGRESS NOTES
This patient needs ER f/u visit scheduled in [place] office, currently not scheduled until 4/2025

## 2024-10-16 ENCOUNTER — OFFICE VISIT (OUTPATIENT)
Dept: PULMONOLOGY | Age: 69
End: 2024-10-16
Payer: COMMERCIAL

## 2024-10-16 VITALS
BODY MASS INDEX: 40.81 KG/M2 | TEMPERATURE: 98.5 F | OXYGEN SATURATION: 94 % | HEART RATE: 96 BPM | WEIGHT: 260 LBS | DIASTOLIC BLOOD PRESSURE: 68 MMHG | HEIGHT: 67 IN | SYSTOLIC BLOOD PRESSURE: 124 MMHG

## 2024-10-16 DIAGNOSIS — J96.12 CHRONIC RESPIRATORY FAILURE WITH HYPOXIA AND HYPERCAPNIA: ICD-10-CM

## 2024-10-16 DIAGNOSIS — J44.9 STAGE 4 VERY SEVERE COPD BY GOLD CLASSIFICATION (HCC): Primary | ICD-10-CM

## 2024-10-16 DIAGNOSIS — J96.11 CHRONIC RESPIRATORY FAILURE WITH HYPOXIA AND HYPERCAPNIA: ICD-10-CM

## 2024-10-16 DIAGNOSIS — I89.0 LYMPHEDEMA OF RIGHT LOWER EXTREMITY: ICD-10-CM

## 2024-10-16 DIAGNOSIS — Z79.52 LONG TERM SYSTEMIC STEROID USER: ICD-10-CM

## 2024-10-16 DIAGNOSIS — I50.32 CHRONIC DIASTOLIC HEART FAILURE (HCC): ICD-10-CM

## 2024-10-16 DIAGNOSIS — E66.9 OBESITY WITH BODY MASS INDEX 30 OR GREATER: ICD-10-CM

## 2024-10-16 PROCEDURE — 1123F ACP DISCUSS/DSCN MKR DOCD: CPT | Performed by: NURSE PRACTITIONER

## 2024-10-16 PROCEDURE — 99215 OFFICE O/P EST HI 40 MIN: CPT | Performed by: NURSE PRACTITIONER

## 2024-10-16 PROCEDURE — 3074F SYST BP LT 130 MM HG: CPT | Performed by: NURSE PRACTITIONER

## 2024-10-16 PROCEDURE — 3078F DIAST BP <80 MM HG: CPT | Performed by: NURSE PRACTITIONER

## 2024-10-16 PROCEDURE — G2212 PROLONG OUTPT/OFFICE VIS: HCPCS | Performed by: NURSE PRACTITIONER

## 2024-10-16 RX ORDER — ROFLUMILAST 250 UG/1
250 TABLET ORAL DAILY
Qty: 30 TABLET | Refills: 6 | Status: SHIPPED | OUTPATIENT
Start: 2024-10-16

## 2024-10-16 RX ORDER — AZITHROMYCIN 500 MG/1
250 TABLET, FILM COATED ORAL DAILY
Qty: 90 TABLET | Refills: 2 | Status: SHIPPED | OUTPATIENT
Start: 2024-10-16

## 2024-10-16 RX ORDER — POTASSIUM CHLORIDE 1.5 G/1.58G
20 POWDER, FOR SOLUTION ORAL DAILY
COMMUNITY

## 2024-10-16 RX ORDER — PREDNISONE 10 MG/1
TABLET ORAL
Qty: 90 TABLET | Refills: 0 | Status: SHIPPED | OUTPATIENT
Start: 2024-10-16

## 2024-10-16 ASSESSMENT — ENCOUNTER SYMPTOMS
SHORTNESS OF BREATH: 1
CHEST TIGHTNESS: 1
WHEEZING: 1
BACK PAIN: 1

## 2024-12-02 RX ORDER — LISINOPRIL 10 MG/1
10 TABLET ORAL DAILY
Qty: 90 TABLET | Refills: 3 | Status: SHIPPED | OUTPATIENT
Start: 2024-12-02

## 2024-12-30 RX ORDER — LISINOPRIL 5 MG/1
TABLET ORAL
Qty: 60 TABLET | Refills: 3 | Status: SHIPPED | OUTPATIENT
Start: 2024-12-30

## 2025-01-22 ENCOUNTER — OFFICE VISIT (OUTPATIENT)
Dept: PULMONOLOGY | Age: 70
End: 2025-01-22
Payer: COMMERCIAL

## 2025-01-22 VITALS
BODY MASS INDEX: 36.96 KG/M2 | WEIGHT: 230 LBS | SYSTOLIC BLOOD PRESSURE: 128 MMHG | TEMPERATURE: 98.1 F | OXYGEN SATURATION: 93 % | DIASTOLIC BLOOD PRESSURE: 62 MMHG | HEART RATE: 103 BPM | HEIGHT: 66 IN

## 2025-01-22 DIAGNOSIS — R60.0 LOCALIZED EDEMA: ICD-10-CM

## 2025-01-22 DIAGNOSIS — Z87.891 PERSONAL HISTORY OF TOBACCO USE: ICD-10-CM

## 2025-01-22 DIAGNOSIS — Z51.81 MEDICATION MONITORING ENCOUNTER: ICD-10-CM

## 2025-01-22 DIAGNOSIS — E66.2 CLASS 2 OBESITY WITH ALVEOLAR HYPOVENTILATION WITHOUT SERIOUS COMORBIDITY WITH BODY MASS INDEX (BMI) OF 37.0 TO 37.9 IN ADULT: ICD-10-CM

## 2025-01-22 DIAGNOSIS — E66.812 CLASS 2 OBESITY WITH ALVEOLAR HYPOVENTILATION WITHOUT SERIOUS COMORBIDITY WITH BODY MASS INDEX (BMI) OF 37.0 TO 37.9 IN ADULT: ICD-10-CM

## 2025-01-22 DIAGNOSIS — J44.9 STAGE 4 VERY SEVERE COPD BY GOLD CLASSIFICATION (HCC): Primary | ICD-10-CM

## 2025-01-22 DIAGNOSIS — J96.12 CHRONIC RESPIRATORY FAILURE WITH HYPOXIA AND HYPERCAPNIA: ICD-10-CM

## 2025-01-22 DIAGNOSIS — J96.11 CHRONIC RESPIRATORY FAILURE WITH HYPOXIA AND HYPERCAPNIA: ICD-10-CM

## 2025-01-22 PROCEDURE — 1159F MED LIST DOCD IN RCRD: CPT | Performed by: NURSE PRACTITIONER

## 2025-01-22 PROCEDURE — 3078F DIAST BP <80 MM HG: CPT | Performed by: NURSE PRACTITIONER

## 2025-01-22 PROCEDURE — 99215 OFFICE O/P EST HI 40 MIN: CPT | Performed by: NURSE PRACTITIONER

## 2025-01-22 PROCEDURE — 1160F RVW MEDS BY RX/DR IN RCRD: CPT | Performed by: NURSE PRACTITIONER

## 2025-01-22 PROCEDURE — 3074F SYST BP LT 130 MM HG: CPT | Performed by: NURSE PRACTITIONER

## 2025-01-22 PROCEDURE — 1123F ACP DISCUSS/DSCN MKR DOCD: CPT | Performed by: NURSE PRACTITIONER

## 2025-01-22 RX ORDER — ALBUTEROL SULFATE 90 UG/1
2 INHALANT RESPIRATORY (INHALATION) EVERY 4 HOURS PRN
Qty: 3 EACH | Refills: 5 | Status: SHIPPED | OUTPATIENT
Start: 2025-01-22

## 2025-01-22 RX ORDER — BUDESONIDE 0.5 MG/2ML
500 INHALANT ORAL 2 TIMES DAILY
Qty: 60 EACH | Refills: 11 | Status: SHIPPED | OUTPATIENT
Start: 2025-01-22

## 2025-01-22 ASSESSMENT — ENCOUNTER SYMPTOMS
SHORTNESS OF BREATH: 1
COLOR CHANGE: 1
COUGH: 1

## 2025-01-23 ENCOUNTER — TELEPHONE (OUTPATIENT)
Dept: PULMONOLOGY | Age: 70
End: 2025-01-23

## 2025-01-23 NOTE — TELEPHONE ENCOUNTER
On 1/22/25 [facility] Scheduling called [place] office stating EKG order they received did not have patient's date of birth or address on order and asked for different order with all patient's information.    Faxed different EKG order to [facility] at [phone] (see media).

## 2025-01-28 DIAGNOSIS — Z51.81 MEDICATION MONITORING ENCOUNTER: ICD-10-CM

## 2025-01-28 DIAGNOSIS — J44.9 STAGE 4 VERY SEVERE COPD BY GOLD CLASSIFICATION (HCC): ICD-10-CM

## 2025-01-28 PROCEDURE — 93000 ELECTROCARDIOGRAM COMPLETE: CPT | Performed by: NURSE PRACTITIONER

## 2025-01-30 ENCOUNTER — TELEPHONE (OUTPATIENT)
Dept: PULMONOLOGY | Age: 70
End: 2025-01-30

## 2025-01-30 NOTE — TELEPHONE ENCOUNTER
----- Message from SEVERIANO Moran CNP sent at 1/28/2025  4:33 PM EST -----  QT interval is normal on EKG

## 2025-01-30 NOTE — TELEPHONE ENCOUNTER
Phoned patient to notify QT interval is normal on EKG per Sara Gordon.  Patient expressed understanding and had no questions at this time.

## 2025-02-21 ENCOUNTER — TELEPHONE (OUTPATIENT)
Dept: PULMONOLOGY | Age: 70
End: 2025-02-21

## 2025-02-21 NOTE — TELEPHONE ENCOUNTER
Started prior authorization request through [employer] portal for CT lung screen scheduled for 3/3/25 at [facility].     Received approval through portal for CT lung screen (see media).  Order ID: [id number]  Valid: 2/21/25-3/22/25

## 2025-04-15 ENCOUNTER — TELEPHONE (OUTPATIENT)
Dept: PULMONOLOGY | Age: 70
End: 2025-04-15

## 2025-04-15 NOTE — TELEPHONE ENCOUNTER
4/15/25 [facility] - Current appt on 4/23/25.  Can pt come to an appt on 4/16/25 Parish in the [place] office?  If she is available, please schedule pt when she calls back if she is able to come dp

## 2025-04-21 ENCOUNTER — TELEPHONE (OUTPATIENT)
Dept: ADMINISTRATIVE | Age: 70
End: 2025-04-21

## 2025-04-21 DIAGNOSIS — R80.8 OTHER PROTEINURIA: Primary | ICD-10-CM

## 2025-04-21 NOTE — TELEPHONE ENCOUNTER
Patient needs lab orders extended due to expiring on Wednesday and she is planning to get labs done that day.

## 2025-04-23 ENCOUNTER — OFFICE VISIT (OUTPATIENT)
Dept: PULMONOLOGY | Age: 70
End: 2025-04-23
Payer: COMMERCIAL

## 2025-04-23 VITALS
HEIGHT: 66 IN | BODY MASS INDEX: 42.88 KG/M2 | TEMPERATURE: 98.3 F | SYSTOLIC BLOOD PRESSURE: 130 MMHG | HEART RATE: 88 BPM | WEIGHT: 266.8 LBS | DIASTOLIC BLOOD PRESSURE: 66 MMHG | OXYGEN SATURATION: 95 %

## 2025-04-23 DIAGNOSIS — J44.9 STAGE 4 VERY SEVERE COPD BY GOLD CLASSIFICATION (HCC): ICD-10-CM

## 2025-04-23 DIAGNOSIS — J44.9 STAGE 4 VERY SEVERE COPD BY GOLD CLASSIFICATION (HCC): Primary | ICD-10-CM

## 2025-04-23 DIAGNOSIS — R91.1 PULMONARY NODULE: ICD-10-CM

## 2025-04-23 DIAGNOSIS — Z87.891 PERSONAL HISTORY OF TOBACCO USE: ICD-10-CM

## 2025-04-23 DIAGNOSIS — J30.2 SEASONAL ALLERGIC RHINITIS, UNSPECIFIED TRIGGER: ICD-10-CM

## 2025-04-23 LAB
BUN BLDV-MCNC: 19 MG/DL
CALCIUM SERPL-MCNC: 9.8 MG/DL
CHLORIDE BLD-SCNC: 97 MMOL/L
CO2: 35 MMOL/L
CREAT SERPL-MCNC: 0.8 MG/DL
CREATININE URINE: 62.3 MG/DL
EGFR: 76
GLUCOSE BLD-MCNC: 254 MG/DL
MICROALBUMIN/CREAT 24H UR: 46.3 MG/DL
MICROALBUMIN/CREAT UR-RTO: 74.3 MG/G
POTASSIUM SERPL-SCNC: 4.2 MMOL/L
SODIUM BLD-SCNC: 140 MMOL/L

## 2025-04-23 PROCEDURE — 1160F RVW MEDS BY RX/DR IN RCRD: CPT

## 2025-04-23 PROCEDURE — 99214 OFFICE O/P EST MOD 30 MIN: CPT

## 2025-04-23 PROCEDURE — 1123F ACP DISCUSS/DSCN MKR DOCD: CPT

## 2025-04-23 PROCEDURE — 3075F SYST BP GE 130 - 139MM HG: CPT

## 2025-04-23 PROCEDURE — 3078F DIAST BP <80 MM HG: CPT

## 2025-04-23 PROCEDURE — 1159F MED LIST DOCD IN RCRD: CPT

## 2025-04-23 RX ORDER — FLUTICASONE PROPIONATE 50 MCG
2 SPRAY, SUSPENSION (ML) NASAL DAILY
Qty: 1 EACH | Refills: 11 | Status: SHIPPED | OUTPATIENT
Start: 2025-04-23 | End: 2026-04-23

## 2025-04-23 RX ORDER — INSULIN LISPRO 100 [IU]/ML
INJECTION, SOLUTION INTRAVENOUS; SUBCUTANEOUS
COMMUNITY
Start: 2025-03-17

## 2025-04-23 RX ORDER — ARFORMOTEROL TARTRATE 15 UG/2ML
SOLUTION RESPIRATORY (INHALATION)
Qty: 120 ML | Refills: 11 | Status: SHIPPED | OUTPATIENT
Start: 2025-04-23

## 2025-04-23 RX ORDER — IPRATROPIUM BROMIDE AND ALBUTEROL SULFATE 2.5; .5 MG/3ML; MG/3ML
1 SOLUTION RESPIRATORY (INHALATION) EVERY 4 HOURS PRN
Qty: 360 ML | Refills: 3 | Status: SHIPPED | OUTPATIENT
Start: 2025-04-23

## 2025-04-23 NOTE — PROGRESS NOTES
[facility]     Patient: GINNA MATHUR, 70 y.o.   : 1955  Date of encounter: 2025   Previously seen by Dr. Paris, Sara Gordon, CNP     Subjective     Patient presents for 3 months COPD  follow up   Ginna presents to the pulmonary clinic today with her  with concern for worsening shortness of breath. She reports compliance with her nebulizer therapy, and reports frequent use of albuterol. Reports she is severely limited physically due to shortness of breath and overall anxiety with breathing status. Reports slightly worsened leg swelling, reports hx of lymphedema, also on Lasix 80 mg QD, HCTZ 25 mg QD. She otherwise denies CP, chest tightness, shortness of breath at present.   Per chart review, Ginna is a 71 y/o female past smoker who follows with the pulmonary clinic for advanced COPD and chronic hypoxic respiratory failure. Pt has been on nebulizer treatments, and overall tolerating well. Has been tested for A1A in the past with MM genotype found. To note other pertinent PMHx of Lymphedema, HFpEF.     Progress History:   - Since last visit any new medical issues? New leg swelling hx of lymphedema   - New ER or hospital visits? 2 ER visits- one in [place] for leg swelling and sent home, one in [place] due to breathing.   - Any new or changes in medicines? Increase in Lantus, meal time insulin, no more victoza  - Using inhalers/nebulizers? yes  - Are they helpful? yes     Immunization History   Administered Date(s) Administered    COVID-19, MODERNA BLUE border, Primary or Immunocompromised, (age 12y+), IM, 100 mcg/0.5mL 2021, 2021    Influenza A ([id number]) Vaccine PF IM 2009    Influenza Virus Vaccine 2015, 02/15/2018, 2020    Influenza, FLUAD, (age 65 y+), IM, Quadv, 0.5mL 2023    Influenza, FLUARIX, FLULAVAL, FLUZONE (age 6 mo+) and AFLURIA, (age 3 y+), Quadv PF, 0.5mL 2013    Pneumococcal Conjugate Vaccine

## 2025-04-24 ASSESSMENT — ENCOUNTER SYMPTOMS
WHEEZING: 1
RHINORRHEA: 1
COUGH: 1
SHORTNESS OF BREATH: 1
SORE THROAT: 0
CHEST TIGHTNESS: 1

## 2025-05-27 ENCOUNTER — OFFICE VISIT (OUTPATIENT)
Dept: NEPHROLOGY | Age: 70
End: 2025-05-27
Payer: COMMERCIAL

## 2025-05-27 VITALS
DIASTOLIC BLOOD PRESSURE: 64 MMHG | BODY MASS INDEX: 42.93 KG/M2 | WEIGHT: 266 LBS | OXYGEN SATURATION: 95 % | HEART RATE: 54 BPM | SYSTOLIC BLOOD PRESSURE: 120 MMHG

## 2025-05-27 DIAGNOSIS — R80.8 OTHER PROTEINURIA: Primary | ICD-10-CM

## 2025-05-27 PROCEDURE — 3078F DIAST BP <80 MM HG: CPT | Performed by: INTERNAL MEDICINE

## 2025-05-27 PROCEDURE — 1123F ACP DISCUSS/DSCN MKR DOCD: CPT | Performed by: INTERNAL MEDICINE

## 2025-05-27 PROCEDURE — 3074F SYST BP LT 130 MM HG: CPT | Performed by: INTERNAL MEDICINE

## 2025-05-27 PROCEDURE — 99214 OFFICE O/P EST MOD 30 MIN: CPT | Performed by: INTERNAL MEDICINE

## 2025-05-27 PROCEDURE — 1160F RVW MEDS BY RX/DR IN RCRD: CPT | Performed by: INTERNAL MEDICINE

## 2025-05-27 PROCEDURE — 1159F MED LIST DOCD IN RCRD: CPT | Performed by: INTERNAL MEDICINE

## 2025-06-09 ENCOUNTER — TELEPHONE (OUTPATIENT)
Dept: PULMONOLOGY | Age: 70
End: 2025-06-09

## 2025-06-09 NOTE — TELEPHONE ENCOUNTER
Started prior authorization request through [employer] portal for CT lung screen scheduled for 6/23/25 at [facility].      Received approval through portal for CT lung screen (see media).  Order ID: [id number]  Valid: 6/9/25-7/8/25

## 2025-06-25 ENCOUNTER — HOSPITAL ENCOUNTER (OUTPATIENT)
Dept: CT IMAGING | Age: 70
Discharge: HOME | End: 2025-06-25
Attending: RADIOLOGY

## 2025-06-25 DIAGNOSIS — Z00.6 EXAMINATION FOR NORMAL COMPARISON FOR CLINICAL RESEARCH: ICD-10-CM

## 2025-07-02 ENCOUNTER — OFFICE VISIT (OUTPATIENT)
Dept: PULMONOLOGY | Age: 70
End: 2025-07-02
Payer: COMMERCIAL

## 2025-07-02 VITALS
WEIGHT: 263.6 LBS | HEART RATE: 92 BPM | DIASTOLIC BLOOD PRESSURE: 82 MMHG | TEMPERATURE: 98 F | SYSTOLIC BLOOD PRESSURE: 126 MMHG | OXYGEN SATURATION: 94 % | BODY MASS INDEX: 42.36 KG/M2 | HEIGHT: 66 IN

## 2025-07-02 DIAGNOSIS — R91.1 PULMONARY NODULE: ICD-10-CM

## 2025-07-02 DIAGNOSIS — J96.11 CHRONIC RESPIRATORY FAILURE WITH HYPOXIA (HCC): ICD-10-CM

## 2025-07-02 DIAGNOSIS — Z87.891 PERSONAL HISTORY OF TOBACCO USE: ICD-10-CM

## 2025-07-02 DIAGNOSIS — J30.2 SEASONAL ALLERGIC RHINITIS, UNSPECIFIED TRIGGER: ICD-10-CM

## 2025-07-02 DIAGNOSIS — J44.9 STAGE 4 VERY SEVERE COPD BY GOLD CLASSIFICATION (HCC): Primary | ICD-10-CM

## 2025-07-02 PROCEDURE — 3079F DIAST BP 80-89 MM HG: CPT

## 2025-07-02 PROCEDURE — 1123F ACP DISCUSS/DSCN MKR DOCD: CPT

## 2025-07-02 PROCEDURE — 99214 OFFICE O/P EST MOD 30 MIN: CPT

## 2025-07-02 PROCEDURE — 1159F MED LIST DOCD IN RCRD: CPT

## 2025-07-02 PROCEDURE — 3074F SYST BP LT 130 MM HG: CPT

## 2025-07-02 RX ORDER — MONTELUKAST SODIUM 10 MG/1
10 TABLET ORAL NIGHTLY
Qty: 30 TABLET | Refills: 3 | Status: SHIPPED | OUTPATIENT
Start: 2025-07-02

## 2025-07-02 ASSESSMENT — ENCOUNTER SYMPTOMS
COUGH: 1
SORE THROAT: 0
RHINORRHEA: 1
SHORTNESS OF BREATH: 1
EYE DISCHARGE: 1
CHEST TIGHTNESS: 1
WHEEZING: 0

## 2025-07-02 NOTE — PROGRESS NOTES
[facility]     Patient: GINNA MATHUR, 70 y.o.   : 1955  Date of encounter: 2025   Previously seen by Dr. Paris, Sara Gordon, CNP, Eliana Li PA-C     Subjective     Patient presents for 2 months COPD  follow up   Ginna presents to the pulmonary clinic today with her  with no complaints of continued shortness of breath, slightly worse in the last month. She reports continued significant limitation from shortness of breath with ambulation, typically only walks from chair to bathroom and back. Reports improvement of cough and wheezing since starting the new nebulizer (yupelri) from the last office visit. Reports continued chest tightess with her symptoms and chills as of late. Pt has been compliant with all three maintenance nebulizers, duonebs Q4H, and takes prednisone as needed. She mentions left sided chest discomfort at times above her left breast that is typically self limiting and improved with rubbing this area. Also notes significant allergies, sinus congestion, PND that has been worse since her last visit- currently taking flonase and zyrtec.    Per chart review, Ginna is a 69 y/o female past smoker who follows with the pulmonary clinic for advanced COPD and chronic hypoxic respiratory failure. Pt has been on nebulizer treatments, and overall tolerating well. Has been tested for A1A in the past with MM genotype found.     Progress History:   - Since last visit any new medical issues? No  - New ER or hospital visits? No  - Any new or changes in medicines? Yes- no longer on victoza, prednisone 0.5 tab PRN   - Using inhalers? Nebulizer    - Are they helpful? Yes     Immunization History   Administered Date(s) Administered    COVID-19, MODERNA BLUE border, Primary or Immunocompromised, (age 12y+), IM, 100 mcg/0.5mL 2021, 2021    Influenza A ([id number]) Vaccine PF IM 2009    Influenza Virus Vaccine 2015, 02/15/2018,

## 2025-07-14 DIAGNOSIS — J44.9 STAGE 4 VERY SEVERE COPD BY GOLD CLASSIFICATION (HCC): ICD-10-CM

## 2025-07-14 RX ORDER — LISINOPRIL 5 MG/1
10 TABLET ORAL DAILY
Qty: 180 TABLET | Refills: 3 | Status: SHIPPED | OUTPATIENT
Start: 2025-07-14 | End: 2025-10-12

## 2025-07-17 NOTE — TELEPHONE ENCOUNTER
Received refill request for Roflumilast (DALIRESP) 250 MCG tablet.   Medication was last ordered by Sara Gordon.   Medication was last ordered on 10/16/24 with 6 refills.     Patient was last seen in the office 7/2/25 by Eliana Li.   Does patient have a scheduled follow up?: yes - 11/5/25 with Eliana Li.     Medication needs to be sent to [facility] [address] [place] [facility] [address] [phone] [facility] [phone].     Thank you, please advise!    Patient's Allergies:  Allergies   Allergen Reactions    Excedrin Extra Strength [Aspirin-Acetaminophen-Caffeine] Other (See Comments)     Not an allergy, concern due to norco, took tylenol before and after norco with no affect    Norco [Hydrocodone-Acetaminophen]

## 2025-07-18 RX ORDER — ROFLUMILAST 250 UG/1
250 TABLET ORAL DAILY
Qty: 30 TABLET | Refills: 0 | Status: SHIPPED | OUTPATIENT
Start: 2025-07-18

## 2025-07-21 DIAGNOSIS — J44.9 STAGE 4 VERY SEVERE COPD BY GOLD CLASSIFICATION (HCC): ICD-10-CM

## 2025-07-22 RX ORDER — ROFLUMILAST 250 UG/1
250 TABLET ORAL DAILY
Qty: 30 TABLET | Refills: 0 | OUTPATIENT
Start: 2025-07-22

## 2025-09-02 DIAGNOSIS — J44.9 STAGE 4 VERY SEVERE COPD BY GOLD CLASSIFICATION (HCC): ICD-10-CM

## 2025-09-02 RX ORDER — ROFLUMILAST 250 UG/1
250 TABLET ORAL DAILY
Qty: 90 TABLET | Refills: 3 | Status: SHIPPED | OUTPATIENT
Start: 2025-09-02